# Patient Record
Sex: MALE | Race: WHITE | NOT HISPANIC OR LATINO | URBAN - METROPOLITAN AREA
[De-identification: names, ages, dates, MRNs, and addresses within clinical notes are randomized per-mention and may not be internally consistent; named-entity substitution may affect disease eponyms.]

---

## 2016-08-24 RX ORDER — PEMBROLIZUMAB 25 MG/ML
0 INJECTION, SOLUTION INTRAVENOUS
Qty: 0 | Refills: 0 | COMMUNITY
Start: 2016-08-24 | End: 2017-05-24

## 2017-06-17 RX ORDER — PAZOPANIB HYDROCHLORIDE 200 MG/1
3 TABLET, FILM COATED ORAL
Qty: 0 | Refills: 0 | DISCHARGE
Start: 2017-06-17

## 2017-06-27 ENCOUNTER — INPATIENT (INPATIENT)
Facility: HOSPITAL | Age: 57
LOS: 0 days | Discharge: HOME | End: 2017-06-28
Attending: HOSPITALIST | Admitting: HOSPITALIST
Payer: COMMERCIAL

## 2017-06-27 VITALS
OXYGEN SATURATION: 95 % | DIASTOLIC BLOOD PRESSURE: 86 MMHG | SYSTOLIC BLOOD PRESSURE: 146 MMHG | HEART RATE: 80 BPM | TEMPERATURE: 98 F | RESPIRATION RATE: 24 BRPM

## 2017-06-27 DIAGNOSIS — N19 UNSPECIFIED KIDNEY FAILURE: ICD-10-CM

## 2017-06-27 DIAGNOSIS — I10 ESSENTIAL (PRIMARY) HYPERTENSION: ICD-10-CM

## 2017-06-27 DIAGNOSIS — Z90.5 ACQUIRED ABSENCE OF KIDNEY: Chronic | ICD-10-CM

## 2017-06-27 DIAGNOSIS — R63.8 OTHER SYMPTOMS AND SIGNS CONCERNING FOOD AND FLUID INTAKE: ICD-10-CM

## 2017-06-27 DIAGNOSIS — A41.9 SEPSIS, UNSPECIFIED ORGANISM: ICD-10-CM

## 2017-06-27 DIAGNOSIS — M25.532 PAIN IN LEFT WRIST: ICD-10-CM

## 2017-06-27 DIAGNOSIS — Z90.2 ACQUIRED ABSENCE OF LUNG [PART OF]: Chronic | ICD-10-CM

## 2017-06-27 DIAGNOSIS — C64.9 MALIGNANT NEOPLASM OF UNSPECIFIED KIDNEY, EXCEPT RENAL PELVIS: ICD-10-CM

## 2017-06-27 DIAGNOSIS — Z29.9 ENCOUNTER FOR PROPHYLACTIC MEASURES, UNSPECIFIED: ICD-10-CM

## 2017-06-27 DIAGNOSIS — G47.33 OBSTRUCTIVE SLEEP APNEA (ADULT) (PEDIATRIC): ICD-10-CM

## 2017-06-27 LAB
ALBUMIN SERPL ELPH-MCNC: 3.9 G/DL — SIGNIFICANT CHANGE UP (ref 3.3–5)
ALP SERPL-CCNC: 72 U/L — SIGNIFICANT CHANGE UP (ref 40–120)
ALT FLD-CCNC: 21 U/L — SIGNIFICANT CHANGE UP (ref 4–41)
APPEARANCE UR: CLEAR — SIGNIFICANT CHANGE UP
AST SERPL-CCNC: 30 U/L — SIGNIFICANT CHANGE UP (ref 4–40)
BASE EXCESS BLDV CALC-SCNC: 2.1 MMOL/L — SIGNIFICANT CHANGE UP
BASOPHILS # BLD AUTO: 0.01 K/UL — SIGNIFICANT CHANGE UP (ref 0–0.2)
BASOPHILS NFR BLD AUTO: 0.1 % — SIGNIFICANT CHANGE UP (ref 0–2)
BILIRUB SERPL-MCNC: 0.8 MG/DL — SIGNIFICANT CHANGE UP (ref 0.2–1.2)
BILIRUB UR-MCNC: NEGATIVE — SIGNIFICANT CHANGE UP
BLOOD GAS VENOUS - CREATININE: 1.48 MG/DL — HIGH (ref 0.5–1.3)
BLOOD UR QL VISUAL: HIGH
BUN SERPL-MCNC: 23 MG/DL — SIGNIFICANT CHANGE UP (ref 7–23)
CALCIUM SERPL-MCNC: 9.4 MG/DL — SIGNIFICANT CHANGE UP (ref 8.4–10.5)
CHLORIDE BLDV-SCNC: 103 MMOL/L — SIGNIFICANT CHANGE UP (ref 96–108)
CHLORIDE SERPL-SCNC: 99 MMOL/L — SIGNIFICANT CHANGE UP (ref 98–107)
CO2 SERPL-SCNC: 25 MMOL/L — SIGNIFICANT CHANGE UP (ref 22–31)
COLOR SPEC: YELLOW — SIGNIFICANT CHANGE UP
CREAT SERPL-MCNC: 1.61 MG/DL — HIGH (ref 0.5–1.3)
EOSINOPHIL # BLD AUTO: 0.1 K/UL — SIGNIFICANT CHANGE UP (ref 0–0.5)
EOSINOPHIL NFR BLD AUTO: 1.3 % — SIGNIFICANT CHANGE UP (ref 0–6)
GAS PNL BLDV: 137 MMOL/L — SIGNIFICANT CHANGE UP (ref 136–146)
GLUCOSE BLDV-MCNC: 126 — HIGH (ref 70–99)
GLUCOSE SERPL-MCNC: 123 MG/DL — HIGH (ref 70–99)
GLUCOSE UR-MCNC: NEGATIVE — SIGNIFICANT CHANGE UP
HCO3 BLDV-SCNC: 25 MMOL/L — SIGNIFICANT CHANGE UP (ref 20–27)
HCT VFR BLD CALC: 44.2 % — SIGNIFICANT CHANGE UP (ref 39–50)
HCT VFR BLDV CALC: 51.6 % — HIGH (ref 39–51)
HGB BLD-MCNC: 14.9 G/DL — SIGNIFICANT CHANGE UP (ref 13–17)
HGB BLDV-MCNC: 16.9 G/DL — SIGNIFICANT CHANGE UP (ref 13–17)
IMM GRANULOCYTES NFR BLD AUTO: 0.6 % — SIGNIFICANT CHANGE UP (ref 0–1.5)
KETONES UR-MCNC: NEGATIVE — SIGNIFICANT CHANGE UP
LACTATE BLDV-MCNC: 1.9 MMOL/L — SIGNIFICANT CHANGE UP (ref 0.5–2)
LEUKOCYTE ESTERASE UR-ACNC: NEGATIVE — SIGNIFICANT CHANGE UP
LYMPHOCYTES # BLD AUTO: 0.82 K/UL — LOW (ref 1–3.3)
LYMPHOCYTES # BLD AUTO: 10.4 % — LOW (ref 13–44)
MCHC RBC-ENTMCNC: 28.4 PG — SIGNIFICANT CHANGE UP (ref 27–34)
MCHC RBC-ENTMCNC: 33.7 % — SIGNIFICANT CHANGE UP (ref 32–36)
MCV RBC AUTO: 84.4 FL — SIGNIFICANT CHANGE UP (ref 80–100)
MONOCYTES # BLD AUTO: 0.35 K/UL — SIGNIFICANT CHANGE UP (ref 0–0.9)
MONOCYTES NFR BLD AUTO: 4.5 % — SIGNIFICANT CHANGE UP (ref 2–14)
NEUTROPHILS # BLD AUTO: 6.52 K/UL — SIGNIFICANT CHANGE UP (ref 1.8–7.4)
NEUTROPHILS NFR BLD AUTO: 83.1 % — HIGH (ref 43–77)
NITRITE UR-MCNC: NEGATIVE — SIGNIFICANT CHANGE UP
PCO2 BLDV: 46 MMHG — SIGNIFICANT CHANGE UP (ref 41–51)
PH BLDV: 7.38 PH — SIGNIFICANT CHANGE UP (ref 7.32–7.43)
PH UR: 6.5 — SIGNIFICANT CHANGE UP (ref 5–8)
PLATELET # BLD AUTO: 147 K/UL — LOW (ref 150–400)
PMV BLD: 9.2 FL — SIGNIFICANT CHANGE UP (ref 7–13)
PO2 BLDV: 39 MMHG — SIGNIFICANT CHANGE UP (ref 35–40)
POTASSIUM BLDV-SCNC: 4.1 MMOL/L — SIGNIFICANT CHANGE UP (ref 3.4–4.5)
POTASSIUM SERPL-MCNC: 4.3 MMOL/L — SIGNIFICANT CHANGE UP (ref 3.5–5.3)
POTASSIUM SERPL-SCNC: 4.3 MMOL/L — SIGNIFICANT CHANGE UP (ref 3.5–5.3)
PROT SERPL-MCNC: 7.9 G/DL — SIGNIFICANT CHANGE UP (ref 6–8.3)
PROT UR-MCNC: >600 — SIGNIFICANT CHANGE UP
RBC # BLD: 5.24 M/UL — SIGNIFICANT CHANGE UP (ref 4.2–5.8)
RBC # FLD: 13.4 % — SIGNIFICANT CHANGE UP (ref 10.3–14.5)
RBC CASTS # UR COMP ASSIST: SIGNIFICANT CHANGE UP (ref 0–?)
SAO2 % BLDV: 67.3 % — SIGNIFICANT CHANGE UP (ref 60–85)
SODIUM SERPL-SCNC: 138 MMOL/L — SIGNIFICANT CHANGE UP (ref 135–145)
SP GR SPEC: 1.02 — SIGNIFICANT CHANGE UP (ref 1–1.03)
SQUAMOUS # UR AUTO: SIGNIFICANT CHANGE UP
UROBILINOGEN FLD QL: 1 E.U. — SIGNIFICANT CHANGE UP (ref 0.2–1)
WBC # BLD: 7.85 K/UL — SIGNIFICANT CHANGE UP (ref 3.8–10.5)
WBC # FLD AUTO: 7.85 K/UL — SIGNIFICANT CHANGE UP (ref 3.8–10.5)
WBC UR QL: SIGNIFICANT CHANGE UP (ref 0–?)

## 2017-06-27 PROCEDURE — 71020: CPT | Mod: 26

## 2017-06-27 PROCEDURE — 73110 X-RAY EXAM OF WRIST: CPT | Mod: 26,LT

## 2017-06-27 PROCEDURE — 73130 X-RAY EXAM OF HAND: CPT | Mod: 26,LT

## 2017-06-27 PROCEDURE — 99223 1ST HOSP IP/OBS HIGH 75: CPT

## 2017-06-27 RX ORDER — LOSARTAN POTASSIUM 100 MG/1
50 TABLET, FILM COATED ORAL
Qty: 0 | Refills: 0 | Status: DISCONTINUED | OUTPATIENT
Start: 2017-06-27 | End: 2017-06-28

## 2017-06-27 RX ORDER — SODIUM CHLORIDE 9 MG/ML
1000 INJECTION, SOLUTION INTRAVENOUS
Qty: 0 | Refills: 0 | Status: DISCONTINUED | OUTPATIENT
Start: 2017-06-27 | End: 2017-06-27

## 2017-06-27 RX ORDER — VANCOMYCIN HCL 1 G
1000 VIAL (EA) INTRAVENOUS ONCE
Qty: 0 | Refills: 0 | Status: COMPLETED | OUTPATIENT
Start: 2017-06-27 | End: 2017-06-27

## 2017-06-27 RX ORDER — LORATADINE 10 MG/1
10 TABLET ORAL DAILY
Qty: 0 | Refills: 0 | Status: DISCONTINUED | OUTPATIENT
Start: 2017-06-27 | End: 2017-06-28

## 2017-06-27 RX ORDER — HEPARIN SODIUM 5000 [USP'U]/ML
5000 INJECTION INTRAVENOUS; SUBCUTANEOUS EVERY 12 HOURS
Qty: 0 | Refills: 0 | Status: DISCONTINUED | OUTPATIENT
Start: 2017-06-27 | End: 2017-06-28

## 2017-06-27 RX ORDER — OXYCODONE HYDROCHLORIDE 5 MG/1
10 TABLET ORAL EVERY 4 HOURS
Qty: 0 | Refills: 0 | Status: DISCONTINUED | OUTPATIENT
Start: 2017-06-27 | End: 2017-06-28

## 2017-06-27 RX ORDER — AMLODIPINE BESYLATE 2.5 MG/1
5 TABLET ORAL DAILY
Qty: 0 | Refills: 0 | Status: DISCONTINUED | OUTPATIENT
Start: 2017-06-27 | End: 2017-06-28

## 2017-06-27 RX ORDER — LACTOBACILLUS ACIDOPHILUS 100MM CELL
1 CAPSULE ORAL DAILY
Qty: 0 | Refills: 0 | Status: DISCONTINUED | OUTPATIENT
Start: 2017-06-27 | End: 2017-06-28

## 2017-06-27 RX ORDER — METOPROLOL TARTRATE 50 MG
25 TABLET ORAL EVERY 12 HOURS
Qty: 0 | Refills: 0 | Status: DISCONTINUED | OUTPATIENT
Start: 2017-06-27 | End: 2017-06-28

## 2017-06-27 RX ORDER — CHOLECALCIFEROL (VITAMIN D3) 125 MCG
400 CAPSULE ORAL DAILY
Qty: 0 | Refills: 0 | Status: DISCONTINUED | OUTPATIENT
Start: 2017-06-27 | End: 2017-06-28

## 2017-06-27 RX ORDER — PIPERACILLIN AND TAZOBACTAM 4; .5 G/20ML; G/20ML
3.38 INJECTION, POWDER, LYOPHILIZED, FOR SOLUTION INTRAVENOUS ONCE
Qty: 0 | Refills: 0 | Status: COMPLETED | OUTPATIENT
Start: 2017-06-27 | End: 2017-06-27

## 2017-06-27 RX ORDER — SODIUM CHLORIDE 9 MG/ML
1000 INJECTION, SOLUTION INTRAVENOUS
Qty: 0 | Refills: 0 | Status: DISCONTINUED | OUTPATIENT
Start: 2017-06-27 | End: 2017-06-28

## 2017-06-27 RX ORDER — LIDOCAINE 4 G/100G
1 CREAM TOPICAL DAILY
Qty: 0 | Refills: 0 | Status: DISCONTINUED | OUTPATIENT
Start: 2017-06-27 | End: 2017-06-28

## 2017-06-27 RX ORDER — SODIUM CHLORIDE 9 MG/ML
1000 INJECTION INTRAMUSCULAR; INTRAVENOUS; SUBCUTANEOUS ONCE
Qty: 0 | Refills: 0 | Status: COMPLETED | OUTPATIENT
Start: 2017-06-27 | End: 2017-06-27

## 2017-06-27 RX ORDER — OXYCODONE HYDROCHLORIDE 5 MG/1
5 TABLET ORAL EVERY 4 HOURS
Qty: 0 | Refills: 0 | Status: DISCONTINUED | OUTPATIENT
Start: 2017-06-27 | End: 2017-06-28

## 2017-06-27 RX ADMIN — SODIUM CHLORIDE 100 MILLILITER(S): 9 INJECTION, SOLUTION INTRAVENOUS at 22:24

## 2017-06-27 RX ADMIN — Medication 250 MILLIGRAM(S): at 20:37

## 2017-06-27 RX ADMIN — Medication 25 MILLIGRAM(S): at 22:23

## 2017-06-27 RX ADMIN — PIPERACILLIN AND TAZOBACTAM 200 GRAM(S): 4; .5 INJECTION, POWDER, LYOPHILIZED, FOR SOLUTION INTRAVENOUS at 19:23

## 2017-06-27 RX ADMIN — LOSARTAN POTASSIUM 50 MILLIGRAM(S): 100 TABLET, FILM COATED ORAL at 22:53

## 2017-06-27 RX ADMIN — OXYCODONE HYDROCHLORIDE 5 MILLIGRAM(S): 5 TABLET ORAL at 23:23

## 2017-06-27 RX ADMIN — OXYCODONE HYDROCHLORIDE 5 MILLIGRAM(S): 5 TABLET ORAL at 22:53

## 2017-06-27 RX ADMIN — SODIUM CHLORIDE 1000 MILLILITER(S): 9 INJECTION INTRAMUSCULAR; INTRAVENOUS; SUBCUTANEOUS at 18:33

## 2017-06-27 RX ADMIN — LIDOCAINE 1 PATCH: 4 CREAM TOPICAL at 22:23

## 2017-06-27 NOTE — ED PROVIDER NOTE - PMH
Diverticulitis    HTN (hypertension)    Incisional hernia  July 2013 & 2010  Obesity    LALA (obstructive sleep apnea)    Renal cell carcinoma

## 2017-06-27 NOTE — ED ADULT TRIAGE NOTE - CHIEF COMPLAINT QUOTE
pt w hx of kidney cancer '14.  pt took last dose of chemo (immunotherapy)  in May.  Now receiving oral target therapy.  c/o low grade fever (100.2) w generalized fatigue.  Sent by Dr Stewart for eval. "just as precaution."  well appearing male in nad.

## 2017-06-27 NOTE — H&P ADULT - PROBLEM SELECTOR PLAN 4
- in the setting of Renal cell carcinoma  - creatinine currently 1.61 (1.07 in 2014)  - no issues with micturition  - UA with protein > 600  - IVF hydration

## 2017-06-27 NOTE — ED PROVIDER NOTE - PSH
Diverticulitis  2010 surgery for diverticulitis  Incisional hernia  abdominal surgery approximately 2010 & 2013

## 2017-06-27 NOTE — H&P ADULT - PROBLEM SELECTOR PROBLEM 6
Nutrition, metabolism, and development symptoms Need for prophylactic measure LALA (obstructive sleep apnea)

## 2017-06-27 NOTE — H&P ADULT - PROBLEM SELECTOR PLAN 1
- unclear source at present  - malaise, fatigue, chills x 10 days, since starting chemotherapy med  - fever to 102.2 F (39 C) rectally in the ED, neutrophilia to 83.1 (no leukocytosis)  - may be due to medications, as well as sequela of IL-2 medication and starting of chemotherapy (as patient indicated being told that this may occur)  - urinalysis without any sign of infection  - CXR with some atelectasis of RLL area, ?decreased lung markings in JUAN  - s/p Vancomycin 1 gram IV and Zosyn 3.375 grams in the ED; continued  - IVF hydration (s/p 1 liter IV in the ED; continued on maintenance of 0.45 NS at 100 mL/Hr x 10 hours)  - f/u blood and urine cultures  - ID consult in the AM (please call)

## 2017-06-27 NOTE — ED ADULT NURSE NOTE - OBJECTIVE STATEMENT
c/o fever and feeling cold. slept most of day. sent in by doctor for blood work and eval. iv start 22g right fore arm. left wrist and hand swollen from trauma.

## 2017-06-27 NOTE — H&P ADULT - MUSCULOSKELETAL
details… detailed exam no joint erythema/no joint warmth/no calf tenderness/normal strength/ROM intact

## 2017-06-27 NOTE — H&P ADULT - PROBLEM SELECTOR PLAN 3
- on chemotherapy (Votrient x 10 days thus far)  - completed 9 moths of clinical trial on 05/24/2017 at [facility] (Pembrolizumab + [id number]); see narrative above  - followed at [facility] for chemotherapy  - has elevated blood pressure, but had not taken meds today  - creatinine = 1.61  - oncology consult in the AM (please call)

## 2017-06-27 NOTE — H&P ADULT - PMH
Diverticulitis    HTN (hypertension)    Incisional hernia  July 2013 & 2010  Obesity    LALA (obstructive sleep apnea)    Renal cell carcinoma  on chemotherapy Diverticulitis    HTN (hypertension)    Incisional hernia  July 2013 & 2010  Obesity    LALA (obstructive sleep apnea)    Renal cell carcinoma  (left) on chemotherapy - s/p L-nephrectomy

## 2017-06-27 NOTE — H&P ADULT - HISTORY OF PRESENT ILLNESS
57 year old male, with past history significant for HTN, Diverticulitis, Obesity, LALA, Renal cell carcinoma with metastases (on chemotherapy) and Incisional hernia, presented to the ED, from his PMD's office, secondary to fever.  Seen and evaluated at bedside;      Vital signs upon ED presentation as follows: BP = 146/86, HR = 80, RR = 24, T = 36.9 C (98.5 F), O2 Sat = 95% on RA.  Diagnosed with Sepsis.  Prescribed Vancomycin 1 gram IV, Zosyn 3.375 grams and NaCl 1 liter. 57 year old male, with past history significant for HTN, Diverticulitis, Obesity, LALA, Renal cell carcinoma with metastases (on chemotherapy), Left Nephrectomy, Lobectomy (left upper lung & ?wedges, per patient) and Incisional hernia, presented to the ED, from his PMD's office, secondary to fever.  Seen and evaluated at bedside with wife, Ellie, present; ill appearing, but in NAD.  Patient relates being on Votrient (chemotherapy med) for the past 10 days at [facility].  He had been advised of possibility of elevated blood pressure and fatigue.  Fatigue has been present since starting the chemotherapy medication and today, patient went to work and slept for (six) 6 hours.  He was subsequently sent to see his PMD and was noted to have a fever (100.2F) at the practitioner's office.  Has been having nausea without vomiting, and chills.  Wife noted that he may have had an unmeasured temperature overnight.  No reports of sweating, vomiting, headaches, abdominal pain, back pain, diarrhea or dysuria.  Has had loss of appetite, chiefly due to loss of taste sensation, and recorded 9 lbs weight loss over the past 10 days.    Patient notedly with pain, tenderness, swelling of the anterior left wrist due to overuse of a caulk gun today - per patient.  He was in a confined area today and had to hold the caulking gun in an awkward position - which resulted in the pain.  Has had pain of the wrist at intervals, but never this intensely.  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    Patient has undergone DNA sequencing re the Renal Cell Carcinoma and has not had any findings of genetic abnormality.    Prior to starting Votrient 10 days ago, patient was on a clinical trial with IL-2 for 9 months at [facility] (Dr. Mookie Mai, [phone]).  Study treatment = Group A - Pembrolizumab + [id number]; stared on 09/15/2016 and  discontinued on 05/24/2017, but has been taking the PO medication for an additional week.    Vital signs upon ED presentation as follows: BP = 146/86, HR = 80, RR = 24, T = 36.9 C (98.5 F), O2 Sat = 95% on RA.  Diagnosed with Sepsis.  Prescribed Vancomycin 1 gram IV, Zosyn 3.375 grams and NaCl 1 liter.

## 2017-06-27 NOTE — H&P ADULT - PSH
Diverticulitis  2010 surgery for diverticulitis  Incisional hernia  abdominal surgery approximately 2010 & 2013 Diverticulitis  2010 surgery for diverticulitis  H/O unilateral nephrectomy  left - 11/2014 ([facility])  History of lobectomy of lung  Left upper lobe and ?wedges (per pt) - 03/2016 at [facility]  Incisional hernia  abdominal surgery approximately 2010 & 2013

## 2017-06-27 NOTE — H&P ADULT - ASSESSMENT
57 year old male, with past history significant for HTN, Diverticulitis, Obesity, LALA, Renal cell carcinoma with metastases (on chemotherapy) and Incisional hernia, presented to the ED, from his PMD's office, secondary to fever.  Vital signs upon ED presentation as follows: BP = 146/86, HR = 80, RR = 24, T = 36.9 C (98.5 F), O2 Sat = 95% on RA.  Diagnosed with Sepsis.  Admitted for further management of

## 2017-06-27 NOTE — ED PROVIDER NOTE - OBJECTIVE STATEMENT
57M PMH renal ca with mets on immunotherapy p/w fever at his PMDs office. Pt reports he started feeling weak sicne starting the immunotherapy associated with some nausea which is c/w expected symptoms following chemo 57M PMH renal ca with mets on immunotherapy p/w fever at his PMDs office. Pt reports he started feeling weak sicne starting the immunotherapy associated with some nausea which is c/w expected symptoms following chemo. PMD evaled pt today, noted to have fever of 100.2 orally. Pt denies cough, sob, abd pain, n/v/d. C/o l wrist pain after overuse of it yesterday with caulk gun. 57M PMH renal ca with mets on immunotherapy p/w fever at his PMDs office. Pt reports he started feeling weak sicne starting the immunotherapy associated with some nausea which is c/w expected symptoms following chemo. PMD evaled pt today, noted to have fever of 100.2 orally. Pt denies cough, sob, abd pain, n/v/d. C/o l wrist pain after overuse of it yesterday with caulk gun.    PMD: Dunia

## 2017-06-27 NOTE — H&P ADULT - NSHPLABSRESULTS_GEN_ALL_CORE
14.9   7.85  )-----------( 147      ( 27 Jun 2017 18:00 )             44.2       06-27    138  |  99  |  23  ----------------------------<  123<H>  4.3   |  25  |  1.61<H>    Ca    9.4      27 Jun 2017 18:00    TPro  7.9  /  Alb  3.9  /  TBili  0.8  /  DBili  x   /  AST  30  /  ALT  21  /  AlkPhos  72  06-27      18:00 - VBG - pH: 7.38  | pCO2: 46    | pO2: 39    | Lactate: 1.9

## 2017-06-27 NOTE — ED PROVIDER NOTE - ATTENDING CONTRIBUTION TO CARE
MATTHEW Attending Note - Dr. Maldonado  57M PMH renal ca with mets on immunotherapy p/w fever at his PMDs office.  PE: pt is alert and oriented, perrl, ent normal, membranes are moist, neck supple. no lymphadenopathy or thyroid enlargement, No JVD.  Chest clear to P&A, Heart- reg rhythm without murmur, rubs or gallops, radial pulses equal bilaterally.  Abd is soft, non-tender, Bowel sounds are active. no mass or organomegaly. : No CVA tenderness. Neuro:  Pt alert and oriented x 3. Perrl    Distal neurosensory is intact. Motor function is 5/5 strength bilaterally.  No focal deficits. Extremities:  No edema.  Skin: warm and dry.  Imp Sepsis.  Plan:  IV fluids, antibiotics and admission

## 2017-06-27 NOTE — H&P ADULT - PROBLEM SELECTOR PLAN 2
- BP to 177/106; asymptomatic  - on Avapro 50 mg (continued as Losartan 50 mg), Amlodipine 5 mg and Metoprolol 25 mg; continued  - follow with repeat measurements

## 2017-06-27 NOTE — H&P ADULT - FAMILY HISTORY
No pertinent family history in first degree relatives <<-----Click on this checkbox to enter Family History Family history of emphysema, mother (smoker)     Family history of leukemia, father     Father  Still living? Yes, Estimated age: Age Unknown  Family history of hypertension, Age at diagnosis: Age Unknown     Aunt  Still living? Unknown  Family history of diabetes mellitus, Age at diagnosis: Age Unknown     Uncle  Still living? Unknown  Family history of diabetes mellitus, Age at diagnosis: Age Unknown

## 2017-06-27 NOTE — ED PROVIDER NOTE - MEDICAL DECISION MAKING DETAILS
57M PMH renal ca on immunotherapy p/w fever +rectal temp here. Will admit for sepsis w/u. Check CBC CMP blood cultures, CXR to eval for PNA. Wrist likely 2/2 overuse, not swollen or warm, however will consider septic arthritis. Check XR wrist to eval for fracture and soft tissue swelling

## 2017-06-27 NOTE — H&P ADULT - NSHPSOCIALHISTORY_GEN_ALL_CORE
SOCIAL HISTORY SOCIAL HISTORY:   - Lives with wife  - Works as Chief   - Stopped drinking heavily in 2014  - Never smoked

## 2017-06-27 NOTE — H&P ADULT - PROBLEM SELECTOR PLAN 5
- possible due to OA associated with overuse  - aggravated by awkward usage of caulk gun today (usually experiences pain, but worst today)  - Oxycodone 5 mg Q4H PRN moderate pain and 10 mg Q4H PRN sever pain  - Lidoderm patch to wrist  - f/u official wrist X-rays results  - consider orthopedic consult if no improvement

## 2017-06-28 VITALS — HEART RATE: 71 BPM | OXYGEN SATURATION: 95 %

## 2017-06-28 LAB
BASOPHILS # BLD AUTO: 0.01 K/UL — SIGNIFICANT CHANGE UP (ref 0–0.2)
BASOPHILS NFR BLD AUTO: 0.2 % — SIGNIFICANT CHANGE UP (ref 0–2)
BUN SERPL-MCNC: 18 MG/DL — SIGNIFICANT CHANGE UP (ref 7–23)
CALCIUM SERPL-MCNC: 8.4 MG/DL — SIGNIFICANT CHANGE UP (ref 8.4–10.5)
CHLORIDE SERPL-SCNC: 99 MMOL/L — SIGNIFICANT CHANGE UP (ref 98–107)
CO2 SERPL-SCNC: 23 MMOL/L — SIGNIFICANT CHANGE UP (ref 22–31)
CREAT SERPL-MCNC: 1.49 MG/DL — HIGH (ref 0.5–1.3)
EOSINOPHIL # BLD AUTO: 0.2 K/UL — SIGNIFICANT CHANGE UP (ref 0–0.5)
EOSINOPHIL NFR BLD AUTO: 3.2 % — SIGNIFICANT CHANGE UP (ref 0–6)
GLUCOSE SERPL-MCNC: 96 MG/DL — SIGNIFICANT CHANGE UP (ref 70–99)
HBA1C BLD-MCNC: 6.2 % — HIGH (ref 4–5.6)
HCT VFR BLD CALC: 41.5 % — SIGNIFICANT CHANGE UP (ref 39–50)
HGB BLD-MCNC: 13.8 G/DL — SIGNIFICANT CHANGE UP (ref 13–17)
IMM GRANULOCYTES NFR BLD AUTO: 0.6 % — SIGNIFICANT CHANGE UP (ref 0–1.5)
LYMPHOCYTES # BLD AUTO: 0.81 K/UL — LOW (ref 1–3.3)
LYMPHOCYTES # BLD AUTO: 12.9 % — LOW (ref 13–44)
MAGNESIUM SERPL-MCNC: 1.9 MG/DL — SIGNIFICANT CHANGE UP (ref 1.6–2.6)
MCHC RBC-ENTMCNC: 28.1 PG — SIGNIFICANT CHANGE UP (ref 27–34)
MCHC RBC-ENTMCNC: 33.3 % — SIGNIFICANT CHANGE UP (ref 32–36)
MCV RBC AUTO: 84.5 FL — SIGNIFICANT CHANGE UP (ref 80–100)
MONOCYTES # BLD AUTO: 0.62 K/UL — SIGNIFICANT CHANGE UP (ref 0–0.9)
MONOCYTES NFR BLD AUTO: 9.9 % — SIGNIFICANT CHANGE UP (ref 2–14)
NEUTROPHILS # BLD AUTO: 4.58 K/UL — SIGNIFICANT CHANGE UP (ref 1.8–7.4)
NEUTROPHILS NFR BLD AUTO: 73.2 % — SIGNIFICANT CHANGE UP (ref 43–77)
PHOSPHATE SERPL-MCNC: 2.9 MG/DL — SIGNIFICANT CHANGE UP (ref 2.5–4.5)
PLATELET # BLD AUTO: 124 K/UL — LOW (ref 150–400)
PMV BLD: 8.7 FL — SIGNIFICANT CHANGE UP (ref 7–13)
POTASSIUM SERPL-MCNC: 4.2 MMOL/L — SIGNIFICANT CHANGE UP (ref 3.5–5.3)
POTASSIUM SERPL-SCNC: 4.2 MMOL/L — SIGNIFICANT CHANGE UP (ref 3.5–5.3)
RBC # BLD: 4.91 M/UL — SIGNIFICANT CHANGE UP (ref 4.2–5.8)
RBC # FLD: 13.6 % — SIGNIFICANT CHANGE UP (ref 10.3–14.5)
SODIUM SERPL-SCNC: 138 MMOL/L — SIGNIFICANT CHANGE UP (ref 135–145)
SPECIMEN SOURCE: SIGNIFICANT CHANGE UP
SPECIMEN SOURCE: SIGNIFICANT CHANGE UP
TSH SERPL-MCNC: 2.69 UIU/ML — SIGNIFICANT CHANGE UP (ref 0.27–4.2)
WBC # BLD: 6.26 K/UL — SIGNIFICANT CHANGE UP (ref 3.8–10.5)
WBC # FLD AUTO: 6.26 K/UL — SIGNIFICANT CHANGE UP (ref 3.8–10.5)

## 2017-06-28 PROCEDURE — 99239 HOSP IP/OBS DSCHRG MGMT >30: CPT

## 2017-06-28 RX ORDER — PIPERACILLIN AND TAZOBACTAM 4; .5 G/20ML; G/20ML
3.38 INJECTION, POWDER, LYOPHILIZED, FOR SOLUTION INTRAVENOUS EVERY 12 HOURS
Qty: 0 | Refills: 0 | Status: DISCONTINUED | OUTPATIENT
Start: 2017-06-28 | End: 2017-06-28

## 2017-06-28 RX ORDER — LOSARTAN POTASSIUM 100 MG/1
1 TABLET, FILM COATED ORAL
Qty: 0 | Refills: 0 | COMMUNITY
Start: 2017-06-28

## 2017-06-28 RX ADMIN — PIPERACILLIN AND TAZOBACTAM 25 GRAM(S): 4; .5 INJECTION, POWDER, LYOPHILIZED, FOR SOLUTION INTRAVENOUS at 08:08

## 2017-06-28 RX ADMIN — OXYCODONE HYDROCHLORIDE 10 MILLIGRAM(S): 5 TABLET ORAL at 10:37

## 2017-06-28 RX ADMIN — LIDOCAINE 1 PATCH: 4 CREAM TOPICAL at 10:07

## 2017-06-28 RX ADMIN — OXYCODONE HYDROCHLORIDE 10 MILLIGRAM(S): 5 TABLET ORAL at 10:07

## 2017-06-28 RX ADMIN — AMLODIPINE BESYLATE 5 MILLIGRAM(S): 2.5 TABLET ORAL at 06:53

## 2017-06-28 RX ADMIN — Medication 25 MILLIGRAM(S): at 06:53

## 2017-06-28 RX ADMIN — LIDOCAINE 1 PATCH: 4 CREAM TOPICAL at 12:44

## 2017-06-28 NOTE — DISCHARGE NOTE ADULT - MEDICATION SUMMARY - MEDICATIONS TO TAKE
I will START or STAY ON the medications listed below when I get home from the hospital:    Probiotic capsule  -- 1 cap(s) by mouth once a day  -- Indication: For Nutrition, metabolism, and development symptoms    Avapro 150 mg oral tablet  -- 1 tab(s) by mouth once a day  -- Indication: For Hypertension    Allegra 180 mg oral tablet  -- 1 tab(s) by mouth once a day  -- Indication: For Allergies    metoprolol succinate 25 mg oral tablet, extended release  -- 1 tab(s) by mouth once a day  -- Indication: For Hypertension    amLODIPine 5 mg oral tablet  -- 1 tab(s) by mouth once a day  -- Indication: For Hypertension    Votrient 200 mg oral tablet  -- 3 tab(s) by mouth once a day (at bedtime)  -- Indication: For Renal cell carcinoma    Vitamin D3 400 intl units oral capsule  -- 1 cap(s) by mouth once a day  -- Indication: For Nutrition, metabolism, and development symptoms

## 2017-06-28 NOTE — PROGRESS NOTE ADULT - PROBLEM SELECTOR PLAN 1
Low grade fever of 100.2 at PMD's and fatigue x10 days. Patient denies any other symptoms and vitals since admission are all wnl. Patient does not meet SIRS criteria and no likely source for infection. Pt also very well appearing and clinically asymptomatic at present. Fatigue likely 2/2 start of new chemotherapy drug Votrient.  - d/c Vancomycin and Zosyn  - f/u blood and urine cultures as outpatient Low grade fever of 100.2 at PMD's and fatigue x10 days. Patient denies any other symptoms and vitals since admission are all wnl. Patient does not meet SIRS criteria and no likely source for infection. Pt also very well appearing and clinically asymptomatic at present. Fatigue likely 2/2 start of new chemotherapy drug Votrient.  - d/c Vancomycin and Zosyn  - f/u blood and urine cultures as outpatient, will call patient to return to ER if Bcx positive.

## 2017-06-28 NOTE — DISCHARGE NOTE ADULT - CONDITIONS AT DISCHARGE
Pt remains alert and oriented with stable vital signs, no acute distress noted, will continue to monitor.

## 2017-06-28 NOTE — DISCHARGE NOTE ADULT - PATIENT PORTAL LINK FT
“You can access the Follow[facility] Patient Portal, offered by [facility], by registering with the following website: [email or web address]”

## 2017-06-28 NOTE — DISCHARGE NOTE ADULT - CARE PLAN
Principal Discharge DX:	Fever Principal Discharge DX:	Fever  Goal:	Outpatient follow up  Instructions for follow-up, activity and diet:	You were sent to the hospital for a low grade fever which might be related to a viral illness or your underlying cancer.  At time of discharge, your urine and blood cultures were still pending but there was low suspicion for infection as you had no localizing symptoms and your chest X-ray and urinalysis was negative. Call your doctor or return ot the hospital if you begin to have recurrent fevers (temperature >100.4).  Secondary Diagnosis:	Renal cell carcinoma  Goal:	Outpatient oncology follow up.  Instructions for follow-up, activity and diet:	Follow up with your oncologist, Dr. Amanda Rice within 1-2 weeks of hospital discharge. Principal Discharge DX:	Fever  Goal:	Outpatient follow up  Instructions for follow-up, activity and diet:	You were sent to the hospital for a low grade fever which might be related to a viral illness or your underlying cancer.  At time of discharge, your urine and blood cultures were still pending but there was low suspicion for infection as you had no localizing symptoms and your chest X-ray and urinalysis was negative. Call your doctor or return ot the hospital if you begin to have recurrent fevers (temperature >100.4).  Secondary Diagnosis:	Renal cell carcinoma  Goal:	Outpatient oncology follow up.  Instructions for follow-up, activity and diet:	Follow up with your oncologist, Dr. Amanda Rice within 1-2 weeks of hospital discharge.  Secondary Diagnosis:	HTN (hypertension)  Goal:	Continued medical management.  Instructions for follow-up, activity and diet:	Continue to take your amlodipine and metoprolol as directed.

## 2017-06-28 NOTE — DISCHARGE NOTE ADULT - HOSPITAL COURSE
HPI:  57 year old male, with past history significant for HTN, Diverticulitis, Obesity, LALA, Renal cell carcinoma with metastases (on chemotherapy), Left Nephrectomy, Lobectomy (left upper lung & ?wedges, per patient) and Incisional hernia, presented to the ED, from his PMD's office, secondary to fever.  Seen and evaluated at bedside with wife, Ellie, present; ill appearing, but in NAD.  Patient relates being on Votrient (chemotherapy med) for the past 10 days at [facility].  He had been advised of possibility of elevated blood pressure and fatigue.  Fatigue has been present since starting the chemotherapy medication and today, patient went to work and slept for (six) 6 hours.  He was subsequently sent to see his PMD and was noted to have a fever (100.2F) at the practitioner's office.  Has been having nausea without vomiting, and chills.  Wife noted that he may have had an unmeasured temperature overnight.  No reports of sweating, vomiting, headaches, abdominal pain, back pain, diarrhea or dysuria.  Has had loss of appetite, chiefly due to loss of taste sensation, and recorded 9 lbs weight loss over the past 10 days.    Patient notedly with pain, tenderness, swelling of the anterior left wrist due to overuse of a caulk gun today - per patient.  He was in a confined area today and had to hold the caulking gun in an awkward position - which resulted in the pain.  Has had pain of the wrist at intervals, but never this intensely.  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    Patient has undergone DNA sequencing re the Renal Cell Carcinoma and has not had any findings of genetic abnormality.    Prior to starting Votrient 10 days ago, patient was on a clinical trial with IL-2 for 9 months at [facility] (Dr. Mookie Mai, [phone]).  Study treatment = Group A - Pembrolizumab + [id number]; stared on 09/15/2016 and  discontinued on 05/24/2017, but has been taking the PO medication for an additional week.    Vital signs upon ED presentation as follows: BP = 146/86, HR = 80, RR = 24, T = 36.9 C (98.5 F), O2 Sat = 95% on RA.  Diagnosed with Sepsis.  Prescribed Vancomycin 1 gram IV, Zosyn 3.375 grams and NaCl 1 liter.    HOSPITAL COURSE: Pt admitted for infectious work up & started empirically on Zosyn.  He had no documented fever in the hospital, remained hemodynamically stable, was non-toxic appearing. On labs, he had no leukocytosis, left-shift or bandemia. CXR & UA was negative. Due to low suspicion for active infection, Zosyn was discontinued as the low grade fever could have been in the setting of underlying malignancy Blood & urine cultures were pending at the time of discharge but since the pt had no clinical evidence of infection or localizing symptoms & felt clinically improved, he was discharged to home w/ instruction to follow up w/ his PMD & oncologist.  In terms of his L-wrist pain, X-ray of L-wrist & L-hand did not show any evidence of fracture or dislocation. HPI:  57 year old male, with past history significant for HTN, Diverticulitis, Obesity, LALA, Renal cell carcinoma with metastases (on chemotherapy), Left Nephrectomy, Lobectomy (left upper lung & ?wedges, per patient) and Incisional hernia, presented to the ED, from his PMD's office, secondary to fever.  Seen and evaluated at bedside with wife, Ellie, present; ill appearing, but in NAD.  Patient relates being on Votrient (chemotherapy med) for the past 10 days at [facility].  He had been advised of possibility of elevated blood pressure and fatigue.  Fatigue has been present since starting the chemotherapy medication and today, patient went to work and slept for (six) 6 hours.  He was subsequently sent to see his PMD and was noted to have a fever (100.2F) at the practitioner's office.  Has been having nausea without vomiting, and chills.  Wife noted that he may have had an unmeasured temperature overnight.  No reports of sweating, vomiting, headaches, abdominal pain, back pain, diarrhea or dysuria.  Has had loss of appetite, chiefly due to loss of taste sensation, and recorded 9 lbs weight loss over the past 10 days.    Patient notedly with pain, tenderness, swelling of the anterior left wrist due to overuse of a caulk gun today - per patient.  He was in a confined area today and had to hold the caulking gun in an awkward position - which resulted in the pain.  Has had pain of the wrist at intervals, but never this intensely.  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    Patient has undergone DNA sequencing re the Renal Cell Carcinoma and has not had any findings of genetic abnormality.    Prior to starting Votrient 10 days ago, patient was on a clinical trial with IL-2 for 9 months at [facility] (Dr. Mookie Mai, [phone]).  Study treatment = Group A - Pembrolizumab + [id number]; stared on 09/15/2016 and  discontinued on 05/24/2017, but has been taking the PO medication for an additional week.    Vital signs upon ED presentation as follows: BP = 146/86, HR = 80, RR = 24, T = 36.9 C (98.5 F), O2 Sat = 95% on RA.  Prescribed Vancomycin 1 gram IV, Zosyn 3.375 grams and NaCl 1 liter.    HOSPITAL COURSE: Pt admitted for infectious work up & started empirically on Zosyn.  He had no documented fever in the hospital, remained hemodynamically stable, was non-toxic appearing. On labs, he had no leukocytosis, left-shift or bandemia. CXR & UA was negative. Due to low suspicion for active infection, Zosyn was discontinued as the low grade fever could have been in the setting of underlying malignancy Blood & urine cultures were pending at the time of discharge but since the pt had no clinical evidence of infection or localizing symptoms & felt clinically improved, he was discharged to home w/ instruction to follow up w/ his PMD & oncologist.  In terms of his L-wrist pain, X-ray of L-wrist & L-hand did not show any evidence of fracture or dislocation.

## 2017-06-28 NOTE — DISCHARGE NOTE ADULT - PROVIDER TOKENS
CHRIS:'[id number]' TOKEN:'[id number]',FREE:[LAST:[Rice],FIRST:[Amanda],PHONE:[phone]],FAX:[(   )    -]]

## 2017-06-28 NOTE — PROGRESS NOTE ADULT - PROBLEM SELECTOR PLAN 5
Possible due to OA associated with overuse and aggravated by awkward usage of caulk gun today (usually experiences pain, but worse yesterday). Pain is improved today and xrays found no bony abnormalities.

## 2017-06-28 NOTE — PROGRESS NOTE ADULT - PROBLEM SELECTOR PLAN 4
- in the setting of Renal cell carcinoma  - creatinine currently 1.49, down from 1.61 on admission  - no issues with micturition  - UA with protein > 600

## 2017-06-28 NOTE — PROGRESS NOTE ADULT - ASSESSMENT
57 year old male, with past history significant for HTN, Diverticulitis, Obesity, LALA, Renal cell carcinoma with metastases (on chemotherapy) and Incisional hernia, presented to the ED, from his PMD's office, secondary to fever.  Vital signs upon ED presentation as follows: BP = 146/86, HR = 80, RR = 24, T = 36.9 C (98.5 F), O2 Sat = 95% on RA.  Diagnosed with Sepsis.  Admitted for further management of 57 year old male, with PMH significant for HTN, Diverticulitis, Obesity, LALA, RCC with metastases (on chemotherapy Votrient) and Incisional hernia, presented to the ED, from his PMD's office, secondary to fever of 100.2 and fatigue.  Vital signs upon ED presentation as follows: BP = 146/86, HR = 80, RR = 24, T = 36.9 C (98.5 F), O2 Sat = 95% on RA. Pt afebrile since admission and asymptomatic.

## 2017-06-28 NOTE — DISCHARGE NOTE ADULT - CARE PROVIDER_API CALL
Jozef Duque (MD), Internal Medicine  [facility] [place] [address]  Phone: [phone]  Fax: [phone] Jozef Duque), Internal Medicine  [facility] [place] [address]  Phone: [phone]  Fax: [phone]    Amanda Rice  Phone: [phone]  Fax: (       -

## 2017-06-28 NOTE — PROGRESS NOTE ADULT - SUBJECTIVE AND OBJECTIVE BOX
Patient is a 57y old  Male who presents with a chief complaint of "Fatigue and pain in my wrist and high blood pressure. My doctor sent me because I had a fever" (27 Jun 2017 23:10)     INTERVAL HPI/OVERNIGHT EVENTS:  Patient says he feels much improved, back to his old self, and that his fatigue and chills are gone.    MEDICATIONS  (STANDING):  heparin  Injectable 5000 Unit(s) SubCutaneous every 12 hours  sodium chloride 0.45%. 1000 milliLiter(s) (100 mL/Hr) IV Continuous <Continuous>  losartan 50 milliGRAM(s) Oral <User Schedule>  lidocaine   Patch 1 Patch Transdermal daily  loratadine 10 milliGRAM(s) Oral daily  metoprolol 25 milliGRAM(s) Oral every 12 hours  amLODIPine   Tablet 5 milliGRAM(s) Oral daily  cholecalciferol 400 Unit(s) Oral daily  lactobacillus acidophilus 1 Tablet(s) Oral daily    MEDICATIONS  (PRN):  oxyCODONE IR 5 milliGRAM(s) Oral every 4 hours PRN Moderate Pain (4 - 6)  oxyCODONE IR 10 milliGRAM(s) Oral every 4 hours PRN Severe Pain (7 - 10)      Allergies:  No Known Allergies    Intolerances      REVIEW OF SYSTEMS:  Constitutional: +fever, weight loss, +fatigue  ENMT:  Denies tinnitus, vertigo, throat pain  Resp: Denies SOB, cough, hemoptysis  CV: Denies chest pain, palpitations, dizziness  GI: Denies abdominal pain, N/V/D/C, melena, hematochezia  : Denies dysuria, increased frequency, hematuria  Neuro: Denies HA, numbness  Skin: Denies rashes, lesions  Lymph Nodes: Denies enlarged glands  Endocrine: Denies heat or cold intolerance, polydipsia, polyuria  MSK: Denies joint pain, swelling  Heme/Lymph: Denies easy bruising, bleeding gums  Immun/All: Denies hives, eczema    Vital Signs Last 24 Hrs  T(C): 37 (28 Jun 2017 05:56), Max: 37.6 (27 Jun 2017 22:16)  T(F): 98.6 (28 Jun 2017 05:56), Max: 99.6 (27 Jun 2017 22:16)  HR: 64 (28 Jun 2017 07:02) (60 - 80)  BP: 136/92 (28 Jun 2017 05:56) (136/92 - 177/106)  BP(mean): --  RR: 20 (28 Jun 2017 07:02) (16 - 24)  SpO2: 94% (28 Jun 2017 07:02) (94% - 99%)    PHYSICAL EXAM:  General: NAD, well-groomed, well-developed  Eyes: EOMI, PERRLA, conjunctiva and sclera clear  ENMT: No tonsillar erythema, exudates, or enlargement; moist mucous membranes  Neck: Supple  Nervous System: AAOX3, motor and sensation grossly intact in b/l UE and b/l LE  Psych: Appropriate affect and mood  Chest: Clear to auscultation bilaterally; no rales, rhonchi, or wheezing  Heart: Regular rate and rhythm; no murmurs, rubs, or gallops; no LE edema  Abd: +BS, soft, nontender, nondistended  Ext:  2+ radial and DP pulses, swelling of L wrist, tenderness to palpation over ulnar aspect of L wrist    LABS:                        13.8   6.26  )-----------( 124      ( 28 Jun 2017 06:00 )             41.5     28 Jun 2017 06:00    138    |  99     |  18     ----------------------------<  96     4.2     |  23     |  1.49     Ca    8.4        28 Jun 2017 06:00  Phos  2.9       28 Jun 2017 06:00  Mg     1.9       28 Jun 2017 06:00    TPro  7.9    /  Alb  3.9    /  TBili  0.8    /  DBili  x      /  AST  30     /  ALT  21     /  AlkPhos  72     27 Jun 2017 18:00    < from: Xray Wrist 3 Views, Left (06.27.17 @ 18:24) >  FINDINGS:     There is no evidence of acute fracture or dislocation of the left wrist   or left hand. Thejoint spaces are maintained. The soft tissues are   unremarkable.    IMPRESSION:     No evidence of acute fracture or dislocation of the left wrist or left   hand.     < end of copied text > MEDICINE ACCEPT NOTE  CC: Patient is a 57y old  Male who presents with a chief complaint of "Fatigue and pain in my wrist and high blood pressure. My doctor sent me because I had a fever" (27 Jun 2017 23:10)     HPI: Pt is a 56 yo M with  PMH of stage IV RCC c/b L nephrectomy, lobectomy, HTN, diverticulitis, LALA sent to the ED by his PMD for fatigue and fever. At PMD's, pt's temp was 100.2 and he complained of fatigue x10 days. Of note, pt started new chemotherapy drug Votrient 10 days ago and has been feeling chills, fatigue and increased BP since then. Pt also recorded a 9 lb weight loss over this period. Since admission, patient feels much improved and no longer feels fatigued or chills.    PMHx:  RCC with mets, c/b L nephrectomy, R lobectomy  HTN  LALA  diverticulitis  hernia    HOME MEDS:  Amlodipine  Propranalol    ALLERGIES:  No known allergies    FHx:  Leukemia - father    SH: Works as a  and lives with his wife. Stopped drinking heavily in 2014. Never smoked.    S: Patient says he feels much improved, back to his old self, and that his fatigue and chills are gone.    MEDICATIONS  (STANDING):  heparin  Injectable 5000 Unit(s) SubCutaneous every 12 hours  sodium chloride 0.45%. 1000 milliLiter(s) (100 mL/Hr) IV Continuous <Continuous>  losartan 50 milliGRAM(s) Oral <User Schedule>  lidocaine   Patch 1 Patch Transdermal daily  loratadine 10 milliGRAM(s) Oral daily  metoprolol 25 milliGRAM(s) Oral every 12 hours  amLODIPine   Tablet 5 milliGRAM(s) Oral daily  cholecalciferol 400 Unit(s) Oral daily  lactobacillus acidophilus 1 Tablet(s) Oral daily    MEDICATIONS  (PRN):  oxyCODONE IR 5 milliGRAM(s) Oral every 4 hours PRN Moderate Pain (4 - 6)  oxyCODONE IR 10 milliGRAM(s) Oral every 4 hours PRN Severe Pain (7 - 10)      REVIEW OF SYSTEMS:  Constitutional: +fever, weight loss, +fatigue  ENMT:  Denies tinnitus, vertigo, throat pain  Resp: Denies SOB, cough, hemoptysis  CV: Denies chest pain, palpitations, dizziness  GI: Denies abdominal pain, N/V/D/C, melena, hematochezia  : Denies dysuria, increased frequency, hematuria  Neuro: Denies HA, numbness  Skin: Denies rashes, lesions  Lymph Nodes: Denies enlarged glands  Endocrine: Denies heat or cold intolerance, polydipsia, polyuria  MSK: Denies joint pain, swelling  Heme/Lymph: Denies easy bruising, bleeding gums  Immun/All: Denies hives, eczema    Vital Signs Last 24 Hrs  T(C): 37 (28 Jun 2017 05:56), Max: 37.6 (27 Jun 2017 22:16)  T(F): 98.6 (28 Jun 2017 05:56), Max: 99.6 (27 Jun 2017 22:16)  HR: 64 (28 Jun 2017 07:02) (60 - 80)  BP: 136/92 (28 Jun 2017 05:56) (136/92 - 177/106)  BP(mean): --  RR: 20 (28 Jun 2017 07:02) (16 - 24)  SpO2: 94% (28 Jun 2017 07:02) (94% - 99%)    PHYSICAL EXAM:  General: NAD, well-groomed, well-developed  Eyes: EOMI, PERRLA, conjunctiva and sclera clear  ENMT: No tonsillar erythema, exudates, or enlargement; moist mucous membranes  Neck: Supple  Nervous System: AAOX3, motor and sensation grossly intact in b/l UE and b/l LE  Psych: Appropriate affect and mood  Chest: Clear to auscultation bilaterally; no rales, rhonchi, or wheezing  Heart: Regular rate and rhythm; no murmurs, rubs, or gallops; no LE edema  Abd: +BS, soft, nontender, nondistended  Ext:  2+ radial and DP pulses, swelling of L wrist, tenderness to palpation over ulnar aspect of L wrist    LABS:                        13.8   6.26  )-----------( 124      ( 28 Jun 2017 06:00 )             41.5     28 Jun 2017 06:00    138    |  99     |  18     ----------------------------<  96     4.2     |  23     |  1.49     Ca    8.4        28 Jun 2017 06:00  Phos  2.9       28 Jun 2017 06:00  Mg     1.9       28 Jun 2017 06:00    TPro  7.9    /  Alb  3.9    /  TBili  0.8    /  DBili  x      /  AST  30     /  ALT  21     /  AlkPhos  72     27 Jun 2017 18:00    < from: Xray Wrist 3 Views, Left (06.27.17 @ 18:24) >  FINDINGS:     There is no evidence of acute fracture or dislocation of the left wrist   or left hand. Thejoint spaces are maintained. The soft tissues are   unremarkable.    IMPRESSION:     No evidence of acute fracture or dislocation of the left wrist or left   hand.

## 2017-06-28 NOTE — PROGRESS NOTE ADULT - ATTENDING COMMENTS
Pt seen and examined, chart and labs reviewed.  Agree with resident note as above.  The patient was sent in by PMD for low grade fevers.  Pt today feels significantly better and denies any infectious complaints including cough, URI symptoms, dysuria, N/V/D.  Pt has been afebrile for the past 24 hours.  The patient already has an oncology appt scheduled for this Friday.  The patient currently is non toxic appearing, afebrile without leukocytosis and improving Cr.  Will discharge home today and will closely follow up Ucx and Bcx.  If cultures are positive, will call patient immediately to return to ER.  Pt is in full agreement with the plan.  D/C time 40 minutes.

## 2017-06-28 NOTE — PROGRESS NOTE ADULT - PROBLEM SELECTOR PLAN 3
- on chemotherapy (Votrient x 10 days thus far)  - completed 9 moths of clinical trial on 05/24/2017 at [facility] (Pembrolizumab + [id number])  - followed at [facility] for chemotherapy  - has elevated blood pressure, but had not taken meds today  - creatinine = 1.61  - oncology consult in the AM (please call) - on chemotherapy (Votrient x 10 days thus far)  - completed 9 moths of clinical trial on 05/24/2017 at [facility] (Pembrolizumab + [id number])  - followed at [facility] for chemotherapy  - has elevated blood pressure, but had not taken meds today  - creatinine downtrending  - Pt has outpatient f/u with oncologist this Friday scheduled

## 2017-06-28 NOTE — DISCHARGE NOTE ADULT - MEDICATION SUMMARY - MEDICATIONS TO STOP TAKING
I will STOP taking the medications listed below when I get home from the hospital:    Keflex 500 mg oral capsule  -- 1 cap(s) by mouth every 6 hours for 7 days    Keytruda  --  intravenous

## 2017-06-28 NOTE — DISCHARGE NOTE ADULT - ADDITIONAL INSTRUCTIONS
Follow up with your primary care doctor, Dr. Tolbert within 1 week of hospital discharge.    Follow up with your oncologist, Dr. Rice within 1 week of hospital discharge. Follow up with your primary care doctor, Dr. Tolbert [phone] within 1 week of hospital discharge.    Follow up with your oncologist, Dr. Rice, [phone], within 1 week of hospital discharge.

## 2017-06-28 NOTE — DISCHARGE NOTE ADULT - PLAN OF CARE
Outpatient follow up You were sent to the hospital for a low grade fever which might be related to a viral illness or your underlying cancer.  At time of discharge, your urine and blood cultures were still pending but there was low suspicion for infection as you had no localizing symptoms and your chest X-ray and urinalysis was negative. Call your doctor or return ot the hospital if you begin to have recurrent fevers (temperature >100.4). Outpatient oncology follow up. Follow up with your oncologist, Dr. Amanda Rice within 1-2 weeks of hospital discharge. Continued medical management. Continue to take your amlodipine and metoprolol as directed.

## 2017-06-29 LAB
BACTERIA UR CULT: SIGNIFICANT CHANGE UP
SPECIMEN SOURCE: SIGNIFICANT CHANGE UP

## 2017-07-02 LAB
BACTERIA BLD CULT: SIGNIFICANT CHANGE UP
BACTERIA BLD CULT: SIGNIFICANT CHANGE UP

## 2017-08-23 ENCOUNTER — APPOINTMENT (OUTPATIENT)
Dept: ULTRASOUND IMAGING | Facility: CLINIC | Age: 57
End: 2017-08-23
Payer: COMMERCIAL

## 2017-08-23 ENCOUNTER — OUTPATIENT (OUTPATIENT)
Dept: OUTPATIENT SERVICES | Facility: HOSPITAL | Age: 57
LOS: 1 days | End: 2017-08-23
Payer: COMMERCIAL

## 2017-08-23 DIAGNOSIS — Z90.5 ACQUIRED ABSENCE OF KIDNEY: Chronic | ICD-10-CM

## 2017-08-23 DIAGNOSIS — Z00.8 ENCOUNTER FOR OTHER GENERAL EXAMINATION: ICD-10-CM

## 2017-08-23 DIAGNOSIS — Z90.2 ACQUIRED ABSENCE OF LUNG [PART OF]: Chronic | ICD-10-CM

## 2017-08-23 PROCEDURE — 76536 US EXAM OF HEAD AND NECK: CPT | Mod: 26

## 2017-08-23 PROCEDURE — 76536 US EXAM OF HEAD AND NECK: CPT

## 2017-12-04 NOTE — ED ADULT NURSE NOTE - NS ED PATIENT SAFETY CONCERN
[address] patient assistance at [phone] and placed order for effexor. Order confirmation number [id number]. This will take 7-10 days to arrive at the office. Patient identification number is [id number]. Refaxed letter to [employer] asking them to send sample medication to patient.
No

## 2018-01-02 ENCOUNTER — EMERGENCY (EMERGENCY)
Facility: HOSPITAL | Age: 58
LOS: 1 days | Discharge: HOME | End: 2018-01-02
Attending: EMERGENCY MEDICINE | Admitting: EMERGENCY MEDICINE
Payer: SELF-PAY

## 2018-01-02 VITALS
SYSTOLIC BLOOD PRESSURE: 152 MMHG | TEMPERATURE: 100 F | HEART RATE: 87 BPM | DIASTOLIC BLOOD PRESSURE: 95 MMHG | OXYGEN SATURATION: 98 % | RESPIRATION RATE: 18 BRPM

## 2018-01-02 DIAGNOSIS — Z90.5 ACQUIRED ABSENCE OF KIDNEY: Chronic | ICD-10-CM

## 2018-01-02 DIAGNOSIS — Z90.2 ACQUIRED ABSENCE OF LUNG [PART OF]: Chronic | ICD-10-CM

## 2018-01-02 LAB
ALBUMIN SERPL ELPH-MCNC: 4 G/DL — SIGNIFICANT CHANGE UP (ref 3.3–5)
ALP SERPL-CCNC: 50 U/L — SIGNIFICANT CHANGE UP (ref 40–120)
ALT FLD-CCNC: 22 U/L — SIGNIFICANT CHANGE UP (ref 4–41)
ANISOCYTOSIS BLD QL: SLIGHT — SIGNIFICANT CHANGE UP
APPEARANCE UR: CLEAR — SIGNIFICANT CHANGE UP
AST SERPL-CCNC: 34 U/L — SIGNIFICANT CHANGE UP (ref 4–40)
BASE EXCESS BLDV CALC-SCNC: 3.1 MMOL/L — SIGNIFICANT CHANGE UP
BASOPHILS # BLD AUTO: 0.01 K/UL — SIGNIFICANT CHANGE UP (ref 0–0.2)
BASOPHILS NFR BLD AUTO: 0.2 % — SIGNIFICANT CHANGE UP (ref 0–2)
BASOPHILS NFR SPEC: 0 % — SIGNIFICANT CHANGE UP (ref 0–2)
BILIRUB SERPL-MCNC: 0.7 MG/DL — SIGNIFICANT CHANGE UP (ref 0.2–1.2)
BILIRUB UR-MCNC: NEGATIVE — SIGNIFICANT CHANGE UP
BLOOD GAS VENOUS - CREATININE: 1.69 MG/DL — HIGH (ref 0.5–1.3)
BLOOD UR QL VISUAL: HIGH
BUN SERPL-MCNC: 21 MG/DL — SIGNIFICANT CHANGE UP (ref 7–23)
CALCIUM SERPL-MCNC: 8.9 MG/DL — SIGNIFICANT CHANGE UP (ref 8.4–10.5)
CHLORIDE BLDV-SCNC: 103 MMOL/L — SIGNIFICANT CHANGE UP (ref 96–108)
CHLORIDE SERPL-SCNC: 98 MMOL/L — SIGNIFICANT CHANGE UP (ref 98–107)
CO2 SERPL-SCNC: 24 MMOL/L — SIGNIFICANT CHANGE UP (ref 22–31)
COLOR SPEC: YELLOW — SIGNIFICANT CHANGE UP
CREAT SERPL-MCNC: 1.65 MG/DL — HIGH (ref 0.5–1.3)
EOSINOPHIL # BLD AUTO: 0.04 K/UL — SIGNIFICANT CHANGE UP (ref 0–0.5)
EOSINOPHIL NFR BLD AUTO: 1 % — SIGNIFICANT CHANGE UP (ref 0–6)
EOSINOPHIL NFR FLD: 2 % — SIGNIFICANT CHANGE UP (ref 0–6)
GAS PNL BLDV: 133 MMOL/L — LOW (ref 136–146)
GLUCOSE BLDV-MCNC: 92 — SIGNIFICANT CHANGE UP (ref 70–99)
GLUCOSE SERPL-MCNC: 87 MG/DL — SIGNIFICANT CHANGE UP (ref 70–99)
GLUCOSE UR-MCNC: NEGATIVE — SIGNIFICANT CHANGE UP
HCO3 BLDV-SCNC: 24 MMOL/L — SIGNIFICANT CHANGE UP (ref 20–27)
HCT VFR BLD CALC: 50.3 % — HIGH (ref 39–50)
HCT VFR BLDV CALC: 53.5 % — HIGH (ref 39–51)
HGB BLD-MCNC: 17.1 G/DL — HIGH (ref 13–17)
HGB BLDV-MCNC: 17.5 G/DL — HIGH (ref 13–17)
IMM GRANULOCYTES # BLD AUTO: 0.02 # — SIGNIFICANT CHANGE UP
IMM GRANULOCYTES NFR BLD AUTO: 0.5 % — SIGNIFICANT CHANGE UP (ref 0–1.5)
KETONES UR-MCNC: NEGATIVE — SIGNIFICANT CHANGE UP
LACTATE BLDV-MCNC: 1.8 MMOL/L — SIGNIFICANT CHANGE UP (ref 0.5–2)
LEUKOCYTE ESTERASE UR-ACNC: NEGATIVE — SIGNIFICANT CHANGE UP
LYMPHOCYTES # BLD AUTO: 0.76 K/UL — LOW (ref 1–3.3)
LYMPHOCYTES # BLD AUTO: 18.9 % — SIGNIFICANT CHANGE UP (ref 13–44)
LYMPHOCYTES NFR SPEC AUTO: 16 % — SIGNIFICANT CHANGE UP (ref 13–44)
MANUAL SMEAR VERIFICATION: SIGNIFICANT CHANGE UP
MCHC RBC-ENTMCNC: 32.8 PG — SIGNIFICANT CHANGE UP (ref 27–34)
MCHC RBC-ENTMCNC: 34 % — SIGNIFICANT CHANGE UP (ref 32–36)
MCV RBC AUTO: 96.4 FL — SIGNIFICANT CHANGE UP (ref 80–100)
MONOCYTES # BLD AUTO: 0.77 K/UL — SIGNIFICANT CHANGE UP (ref 0–0.9)
MONOCYTES NFR BLD AUTO: 19.1 % — HIGH (ref 2–14)
MONOCYTES NFR BLD: 15 % — HIGH (ref 2–9)
MUCOUS THREADS # UR AUTO: SIGNIFICANT CHANGE UP
MYELOCYTES NFR BLD: 1 % — HIGH (ref 0–0)
NEUTROPHIL AB SER-ACNC: 55 % — SIGNIFICANT CHANGE UP (ref 43–77)
NEUTROPHILS # BLD AUTO: 2.43 K/UL — SIGNIFICANT CHANGE UP (ref 1.8–7.4)
NEUTROPHILS NFR BLD AUTO: 60.3 % — SIGNIFICANT CHANGE UP (ref 43–77)
NEUTS BAND # BLD: 8 % — HIGH (ref 0–6)
NITRITE UR-MCNC: NEGATIVE — SIGNIFICANT CHANGE UP
NRBC # FLD: 0 — SIGNIFICANT CHANGE UP
PCO2 BLDV: 53 MMHG — HIGH (ref 41–51)
PH BLDV: 7.35 PH — SIGNIFICANT CHANGE UP (ref 7.32–7.43)
PH UR: 6.5 — SIGNIFICANT CHANGE UP (ref 4.6–8)
PLATELET # BLD AUTO: 109 K/UL — LOW (ref 150–400)
PLATELET COUNT - ESTIMATE: SIGNIFICANT CHANGE UP
PMV BLD: 8.8 FL — SIGNIFICANT CHANGE UP (ref 7–13)
PO2 BLDV: 28 MMHG — LOW (ref 35–40)
POTASSIUM BLDV-SCNC: 3.8 MMOL/L — SIGNIFICANT CHANGE UP (ref 3.4–4.5)
POTASSIUM SERPL-MCNC: 4.1 MMOL/L — SIGNIFICANT CHANGE UP (ref 3.5–5.3)
POTASSIUM SERPL-SCNC: 4.1 MMOL/L — SIGNIFICANT CHANGE UP (ref 3.5–5.3)
PROT SERPL-MCNC: 7.6 G/DL — SIGNIFICANT CHANGE UP (ref 6–8.3)
PROT UR-MCNC: 300 MG/DL — SIGNIFICANT CHANGE UP
RBC # BLD: 5.22 M/UL — SIGNIFICANT CHANGE UP (ref 4.2–5.8)
RBC # FLD: 14.8 % — HIGH (ref 10.3–14.5)
RBC CASTS # UR COMP ASSIST: HIGH (ref 0–?)
SAO2 % BLDV: 45.4 % — LOW (ref 60–85)
SODIUM SERPL-SCNC: 137 MMOL/L — SIGNIFICANT CHANGE UP (ref 135–145)
SP GR SPEC: 1.02 — SIGNIFICANT CHANGE UP (ref 1–1.04)
SQUAMOUS # UR AUTO: SIGNIFICANT CHANGE UP
UROBILINOGEN FLD QL: NORMAL MG/DL — SIGNIFICANT CHANGE UP
VARIANT LYMPHS # BLD: 3 % — SIGNIFICANT CHANGE UP
WBC # BLD: 4.03 K/UL — SIGNIFICANT CHANGE UP (ref 3.8–10.5)
WBC # FLD AUTO: 4.03 K/UL — SIGNIFICANT CHANGE UP (ref 3.8–10.5)
WBC UR QL: SIGNIFICANT CHANGE UP (ref 0–?)

## 2018-01-02 PROCEDURE — 99284 EMERGENCY DEPT VISIT MOD MDM: CPT

## 2018-01-02 PROCEDURE — 71046 X-RAY EXAM CHEST 2 VIEWS: CPT | Mod: 26

## 2018-01-02 RX ORDER — ACETAMINOPHEN 500 MG
1000 TABLET ORAL ONCE
Qty: 0 | Refills: 0 | Status: COMPLETED | OUTPATIENT
Start: 2018-01-02 | End: 2018-01-02

## 2018-01-02 RX ORDER — VANCOMYCIN HCL 1 G
1000 VIAL (EA) INTRAVENOUS ONCE
Qty: 0 | Refills: 0 | Status: DISCONTINUED | OUTPATIENT
Start: 2018-01-02 | End: 2018-01-06

## 2018-01-02 RX ORDER — AZITHROMYCIN 500 MG/1
1 TABLET, FILM COATED ORAL
Qty: 5 | Refills: 0 | OUTPATIENT
Start: 2018-01-02 | End: 2018-01-06

## 2018-01-02 RX ORDER — SODIUM CHLORIDE 9 MG/ML
1000 INJECTION INTRAMUSCULAR; INTRAVENOUS; SUBCUTANEOUS ONCE
Qty: 0 | Refills: 0 | Status: COMPLETED | OUTPATIENT
Start: 2018-01-02 | End: 2018-01-02

## 2018-01-02 RX ORDER — AZITHROMYCIN 500 MG/1
1 TABLET, FILM COATED ORAL
Qty: 5 | Refills: 0
Start: 2018-01-02 | End: 2018-01-06

## 2018-01-02 RX ORDER — PIPERACILLIN AND TAZOBACTAM 4; .5 G/20ML; G/20ML
3.38 INJECTION, POWDER, LYOPHILIZED, FOR SOLUTION INTRAVENOUS ONCE
Qty: 0 | Refills: 0 | Status: DISCONTINUED | OUTPATIENT
Start: 2018-01-02 | End: 2018-01-06

## 2018-01-02 RX ADMIN — SODIUM CHLORIDE 1000 MILLILITER(S): 9 INJECTION INTRAMUSCULAR; INTRAVENOUS; SUBCUTANEOUS at 17:52

## 2018-01-02 RX ADMIN — Medication 400 MILLIGRAM(S): at 17:52

## 2018-01-02 NOTE — ED PROVIDER NOTE - PLAN OF CARE
Take antibiotics as prescribed. Follow up with your oncologist within 2 days.  Advance activity as tolerated.  Continue all previously prescribed medications as directed. You can use Tylenol 650 mg every 4 hours for pain/fever. Follow up with your primary care physician in 48-72 hours- bring copies of your results.  Return to the emergency department for chest pain, shortness of breath, dizziness, or worsening, concerning, or persistent symptoms.

## 2018-01-02 NOTE — ED PROVIDER NOTE - OBJECTIVE STATEMENT
56 yo M hx of RCC on immunotherapy with hx of mets to thyroid, lungs here for fever x 2 days. tmax 102.8. Pt reports that over the past two days has had fever, taking tylenol, last dose this morning. Went to PMD and flu neg and told to come to ED for blood work and CXR. Pt reports mild congestion and cough over the past few days as well. Denies chills vomiting diarrhea SOB CP weakness HA dizziness rash. Denies chemo.

## 2018-01-02 NOTE — ED PROVIDER NOTE - PMH
Diverticulitis    HTN (hypertension)    Incisional hernia  July 2013 & 2010  Obesity    LALA (obstructive sleep apnea)    Renal cell carcinoma  (left) on chemotherapy - s/p L-nephrectomy

## 2018-01-02 NOTE — ED PROVIDER NOTE - CHPI ED SYMPTOMS NEG
no tingling/no decreased eating/drinking/no dizziness/no pain/no nausea/no weakness/no numbness/no vomiting/no chills

## 2018-01-02 NOTE — ED PROVIDER NOTE - PSH
Diverticulitis  2010 surgery for diverticulitis  H/O unilateral nephrectomy  left - 11/2014 ([facility])  History of lobectomy of lung  Left upper lobe and ?wedges (per pt) - 03/2016 at [facility]  Incisional hernia  abdominal surgery approximately 2010 & 2013

## 2018-01-02 NOTE — ED PROVIDER NOTE - PROGRESS NOTE DETAILS
ROSHAN Crenshaw: labs reassuring, UA negative, CXR clear for PNA however given symptoms and clinical picture discussed with patient staying in hospital for work up of fever with cultures, pt is adament that he does not want to stay in hospital and wants to go home. Given no neurotpenia or WBC elevation and CXR clear will dc with rusty with close follow up. I called pts onc at [facility] Dr. Rice and left message with answering service however have not received call back from covering physician. Will dc with copies of results and abx.

## 2018-01-02 NOTE — ED ADULT TRIAGE NOTE - CHIEF COMPLAINT QUOTE
Oncologist sent for CXR 2/2 cough and fever. PMH metastatic renal cell carcinoma on PO treatment. Pt not on chemo or radiation. Tested for the flu yesterday and came back negative. Sent to r/o PNA.

## 2018-01-02 NOTE — ED PROVIDER NOTE - MEDICAL DECISION MAKING DETAILS
58 yo M here for fever in setting on RCC with mets. On immunotherapy, localized tx, denies chemo. PLAN: labs, urine, cultures, cxr, abx, antipyretics.

## 2018-01-02 NOTE — ED ADULT NURSE NOTE - OBJECTIVE STATEMENT
Patient received into INT07 AA&Ox3 c/o fever, congestion, and cough x2 days. Patient denies chest pain, N/V, SOB, dyspnea, abdominal pain at this time. 20g PIV in place to left FA, labs drawn, medications/1L NS bolus administered per orders, NAD noted - will continue to monitor.

## 2018-01-03 LAB
SPECIMEN SOURCE: SIGNIFICANT CHANGE UP
SPECIMEN SOURCE: SIGNIFICANT CHANGE UP

## 2018-01-04 LAB
BACTERIA UR CULT: SIGNIFICANT CHANGE UP
SPECIMEN SOURCE: SIGNIFICANT CHANGE UP

## 2018-01-07 LAB
BACTERIA BLD CULT: SIGNIFICANT CHANGE UP
BACTERIA BLD CULT: SIGNIFICANT CHANGE UP

## 2018-03-19 ENCOUNTER — RESULT REVIEW (OUTPATIENT)
Age: 58
End: 2018-03-19

## 2018-04-09 ENCOUNTER — OUTPATIENT (OUTPATIENT)
Dept: OUTPATIENT SERVICES | Facility: HOSPITAL | Age: 58
LOS: 1 days | End: 2018-04-09
Payer: COMMERCIAL

## 2018-04-09 ENCOUNTER — APPOINTMENT (OUTPATIENT)
Dept: ULTRASOUND IMAGING | Facility: IMAGING CENTER | Age: 58
End: 2018-04-09
Payer: COMMERCIAL

## 2018-04-09 DIAGNOSIS — Z90.2 ACQUIRED ABSENCE OF LUNG [PART OF]: Chronic | ICD-10-CM

## 2018-04-09 DIAGNOSIS — Z00.8 ENCOUNTER FOR OTHER GENERAL EXAMINATION: ICD-10-CM

## 2018-04-09 DIAGNOSIS — Z90.5 ACQUIRED ABSENCE OF KIDNEY: Chronic | ICD-10-CM

## 2018-04-09 PROCEDURE — 76536 US EXAM OF HEAD AND NECK: CPT

## 2018-04-09 PROCEDURE — 76536 US EXAM OF HEAD AND NECK: CPT | Mod: 26

## 2018-06-04 ENCOUNTER — APPOINTMENT (OUTPATIENT)
Dept: PHYSICAL MEDICINE AND REHAB | Facility: CLINIC | Age: 58
End: 2018-06-04
Payer: COMMERCIAL

## 2018-06-04 VITALS
TEMPERATURE: 98.1 F | DIASTOLIC BLOOD PRESSURE: 82 MMHG | SYSTOLIC BLOOD PRESSURE: 138 MMHG | HEART RATE: 60 BPM | OXYGEN SATURATION: 97 %

## 2018-06-04 DIAGNOSIS — M46.90 UNSPECIFIED INFLAMMATORY SPONDYLOPATHY, SITE UNSPECIFIED: ICD-10-CM

## 2018-06-04 PROCEDURE — 99203 OFFICE O/P NEW LOW 30 MIN: CPT

## 2018-10-12 ENCOUNTER — APPOINTMENT (OUTPATIENT)
Dept: PHYSICAL MEDICINE AND REHAB | Facility: CLINIC | Age: 58
End: 2018-10-12

## 2020-04-13 NOTE — ED PROVIDER NOTE - CARE PLAN
Essential hypertension Principal Discharge DX:	Fever Principal Discharge DX:	Fever  Instructions for follow-up, activity and diet:	Take antibiotics as prescribed. Follow up with your oncologist within 2 days.  Advance activity as tolerated.  Continue all previously prescribed medications as directed. You can use Tylenol 650 mg every 4 hours for pain/fever. Follow up with your primary care physician in 48-72 hours- bring copies of your results.  Return to the emergency department for chest pain, shortness of breath, dizziness, or worsening, concerning, or persistent symptoms.

## 2020-05-28 ENCOUNTER — TRANSCRIPTION ENCOUNTER (OUTPATIENT)
Age: 60
End: 2020-05-28

## 2020-05-28 ENCOUNTER — INPATIENT (INPATIENT)
Facility: HOSPITAL | Age: 60
LOS: 1 days | Discharge: HOME | End: 2020-05-30
Attending: INTERNAL MEDICINE | Admitting: INTERNAL MEDICINE
Payer: COMMERCIAL

## 2020-05-28 ENCOUNTER — APPOINTMENT (OUTPATIENT)
Dept: CARDIOLOGY | Facility: HOSPITAL | Age: 60
End: 2020-05-28

## 2020-05-28 VITALS
DIASTOLIC BLOOD PRESSURE: 107 MMHG | RESPIRATION RATE: 16 BRPM | HEART RATE: 74 BPM | TEMPERATURE: 98 F | SYSTOLIC BLOOD PRESSURE: 172 MMHG

## 2020-05-28 DIAGNOSIS — Z90.5 ACQUIRED ABSENCE OF KIDNEY: Chronic | ICD-10-CM

## 2020-05-28 DIAGNOSIS — Z90.2 ACQUIRED ABSENCE OF LUNG [PART OF]: Chronic | ICD-10-CM

## 2020-05-28 DIAGNOSIS — I21.3 ST ELEVATION (STEMI) MYOCARDIAL INFARCTION OF UNSPECIFIED SITE: ICD-10-CM

## 2020-05-28 LAB
ALBUMIN SERPL ELPH-MCNC: 4.5 G/DL — SIGNIFICANT CHANGE UP (ref 3.3–5)
ALP SERPL-CCNC: 53 U/L — SIGNIFICANT CHANGE UP (ref 40–120)
ALT FLD-CCNC: 25 U/L — SIGNIFICANT CHANGE UP (ref 4–41)
ANION GAP SERPL CALC-SCNC: 17 MMO/L — HIGH (ref 7–14)
APTT BLD: 38.2 SEC — HIGH (ref 27.5–36.3)
AST SERPL-CCNC: 40 U/L — SIGNIFICANT CHANGE UP (ref 4–40)
BASOPHILS # BLD AUTO: 0.02 K/UL — SIGNIFICANT CHANGE UP (ref 0–0.2)
BASOPHILS NFR BLD AUTO: 0.3 % — SIGNIFICANT CHANGE UP (ref 0–2)
BILIRUB SERPL-MCNC: 0.6 MG/DL — SIGNIFICANT CHANGE UP (ref 0.2–1.2)
BLD GP AB SCN SERPL QL: NEGATIVE — SIGNIFICANT CHANGE UP
BUN SERPL-MCNC: 32 MG/DL — HIGH (ref 7–23)
CALCIUM SERPL-MCNC: 9.4 MG/DL — SIGNIFICANT CHANGE UP (ref 8.4–10.5)
CHLORIDE SERPL-SCNC: 101 MMOL/L — SIGNIFICANT CHANGE UP (ref 98–107)
CK MB BLD-MCNC: 3.8 — HIGH (ref 0–2.5)
CK MB BLD-MCNC: 8.94 NG/ML — HIGH (ref 1–6.6)
CK SERPL-CCNC: 237 U/L — HIGH (ref 30–200)
CO2 SERPL-SCNC: 21 MMOL/L — LOW (ref 22–31)
CREAT SERPL-MCNC: 1.59 MG/DL — HIGH (ref 0.5–1.3)
EOSINOPHIL # BLD AUTO: 0.12 K/UL — SIGNIFICANT CHANGE UP (ref 0–0.5)
EOSINOPHIL NFR BLD AUTO: 2 % — SIGNIFICANT CHANGE UP (ref 0–6)
GLUCOSE SERPL-MCNC: 121 MG/DL — HIGH (ref 70–99)
HCT VFR BLD CALC: 56.1 % — HIGH (ref 39–50)
HGB BLD-MCNC: 18.2 G/DL — HIGH (ref 13–17)
IMM GRANULOCYTES NFR BLD AUTO: 1.2 % — SIGNIFICANT CHANGE UP (ref 0–1.5)
INR BLD: 0.9 — SIGNIFICANT CHANGE UP (ref 0.88–1.17)
LYMPHOCYTES # BLD AUTO: 0.83 K/UL — LOW (ref 1–3.3)
LYMPHOCYTES # BLD AUTO: 13.7 % — SIGNIFICANT CHANGE UP (ref 13–44)
MCHC RBC-ENTMCNC: 31.5 PG — SIGNIFICANT CHANGE UP (ref 27–34)
MCHC RBC-ENTMCNC: 32.4 % — SIGNIFICANT CHANGE UP (ref 32–36)
MCV RBC AUTO: 97.1 FL — SIGNIFICANT CHANGE UP (ref 80–100)
MONOCYTES # BLD AUTO: 0.34 K/UL — SIGNIFICANT CHANGE UP (ref 0–0.9)
MONOCYTES NFR BLD AUTO: 5.6 % — SIGNIFICANT CHANGE UP (ref 2–14)
NEUTROPHILS # BLD AUTO: 4.68 K/UL — SIGNIFICANT CHANGE UP (ref 1.8–7.4)
NEUTROPHILS NFR BLD AUTO: 77.2 % — HIGH (ref 43–77)
NRBC # FLD: 0 K/UL — SIGNIFICANT CHANGE UP (ref 0–0)
PLATELET # BLD AUTO: 166 K/UL — SIGNIFICANT CHANGE UP (ref 150–400)
PMV BLD: 9.3 FL — SIGNIFICANT CHANGE UP (ref 7–13)
POTASSIUM SERPL-MCNC: 4.9 MMOL/L — SIGNIFICANT CHANGE UP (ref 3.5–5.3)
POTASSIUM SERPL-SCNC: 4.9 MMOL/L — SIGNIFICANT CHANGE UP (ref 3.5–5.3)
PROT SERPL-MCNC: 7.8 G/DL — SIGNIFICANT CHANGE UP (ref 6–8.3)
PROTHROM AB SERPL-ACNC: 10.3 SEC — SIGNIFICANT CHANGE UP (ref 9.8–13.1)
RBC # BLD: 5.78 M/UL — SIGNIFICANT CHANGE UP (ref 4.2–5.8)
RBC # FLD: 15.9 % — HIGH (ref 10.3–14.5)
RH IG SCN BLD-IMP: POSITIVE — SIGNIFICANT CHANGE UP
SARS-COV-2 RNA SPEC QL NAA+PROBE: SIGNIFICANT CHANGE UP
SODIUM SERPL-SCNC: 139 MMOL/L — SIGNIFICANT CHANGE UP (ref 135–145)
WBC # BLD: 6.06 K/UL — SIGNIFICANT CHANGE UP (ref 3.8–10.5)
WBC # FLD AUTO: 6.06 K/UL — SIGNIFICANT CHANGE UP (ref 3.8–10.5)

## 2020-05-28 PROCEDURE — 76937 US GUIDE VASCULAR ACCESS: CPT | Mod: 26

## 2020-05-28 PROCEDURE — 93458 L HRT ARTERY/VENTRICLE ANGIO: CPT | Mod: 26,59

## 2020-05-28 PROCEDURE — 92941 PRQ TRLML REVSC TOT OCCL AMI: CPT | Mod: RC

## 2020-05-28 PROCEDURE — 93306 TTE W/DOPPLER COMPLETE: CPT | Mod: 26

## 2020-05-28 RX ORDER — TICAGRELOR 90 MG/1
180 TABLET ORAL ONCE
Refills: 0 | Status: COMPLETED | OUTPATIENT
Start: 2020-05-28 | End: 2020-05-28

## 2020-05-28 RX ORDER — TICAGRELOR 90 MG/1
90 TABLET ORAL EVERY 12 HOURS
Refills: 0 | Status: DISCONTINUED | OUTPATIENT
Start: 2020-05-28 | End: 2020-05-30

## 2020-05-28 RX ORDER — AMLODIPINE BESYLATE 2.5 MG/1
1 TABLET ORAL
Qty: 0 | Refills: 0 | DISCHARGE

## 2020-05-28 RX ORDER — HEPARIN SODIUM 5000 [USP'U]/ML
INJECTION INTRAVENOUS; SUBCUTANEOUS
Qty: 25000 | Refills: 0 | Status: DISCONTINUED | OUTPATIENT
Start: 2020-05-28 | End: 2020-05-28

## 2020-05-28 RX ORDER — HEPARIN SODIUM 5000 [USP'U]/ML
4000 INJECTION INTRAVENOUS; SUBCUTANEOUS EVERY 6 HOURS
Refills: 0 | Status: DISCONTINUED | OUTPATIENT
Start: 2020-05-28 | End: 2020-05-28

## 2020-05-28 RX ORDER — ATORVASTATIN CALCIUM 80 MG/1
80 TABLET, FILM COATED ORAL AT BEDTIME
Refills: 0 | Status: DISCONTINUED | OUTPATIENT
Start: 2020-05-28 | End: 2020-05-30

## 2020-05-28 RX ORDER — IRBESARTAN 75 MG/1
1 TABLET ORAL
Qty: 0 | Refills: 0 | DISCHARGE

## 2020-05-28 RX ORDER — CHOLECALCIFEROL (VITAMIN D3) 125 MCG
400 CAPSULE ORAL DAILY
Refills: 0 | Status: DISCONTINUED | OUTPATIENT
Start: 2020-05-28 | End: 2020-05-30

## 2020-05-28 RX ORDER — HEPARIN SODIUM 5000 [USP'U]/ML
5000 INJECTION INTRAVENOUS; SUBCUTANEOUS ONCE
Refills: 0 | Status: COMPLETED | OUTPATIENT
Start: 2020-05-28 | End: 2020-05-28

## 2020-05-28 RX ORDER — ASPIRIN/CALCIUM CARB/MAGNESIUM 324 MG
81 TABLET ORAL DAILY
Refills: 0 | Status: DISCONTINUED | OUTPATIENT
Start: 2020-05-28 | End: 2020-05-28

## 2020-05-28 RX ORDER — METOPROLOL TARTRATE 50 MG
1 TABLET ORAL
Qty: 0 | Refills: 0 | DISCHARGE

## 2020-05-28 RX ORDER — ASPIRIN/CALCIUM CARB/MAGNESIUM 324 MG
81 TABLET ORAL DAILY
Refills: 0 | Status: DISCONTINUED | OUTPATIENT
Start: 2020-05-29 | End: 2020-05-30

## 2020-05-28 RX ORDER — LEVOTHYROXINE SODIUM 125 MCG
175 TABLET ORAL DAILY
Refills: 0 | Status: DISCONTINUED | OUTPATIENT
Start: 2020-05-28 | End: 2020-05-30

## 2020-05-28 RX ORDER — CHLORHEXIDINE GLUCONATE 213 G/1000ML
1 SOLUTION TOPICAL
Refills: 0 | Status: DISCONTINUED | OUTPATIENT
Start: 2020-05-28 | End: 2020-05-30

## 2020-05-28 RX ADMIN — TICAGRELOR 180 MILLIGRAM(S): 90 TABLET ORAL at 12:29

## 2020-05-28 RX ADMIN — HEPARIN SODIUM 5000 UNIT(S): 5000 INJECTION INTRAVENOUS; SUBCUTANEOUS at 12:29

## 2020-05-28 RX ADMIN — ATORVASTATIN CALCIUM 80 MILLIGRAM(S): 80 TABLET, FILM COATED ORAL at 21:37

## 2020-05-28 RX ADMIN — TICAGRELOR 90 MILLIGRAM(S): 90 TABLET ORAL at 17:18

## 2020-05-28 NOTE — H&P ADULT - NSICDXPASTSURGICALHX_GEN_ALL_CORE_FT
PAST SURGICAL HISTORY:  Diverticulitis 2010 surgery for diverticulitis    H/O unilateral nephrectomy left - 11/2014 ([facility])    History of lobectomy of lung Left upper lobe and ?wedges (per pt) - 03/2016 at [facility]    Incisional hernia abdominal surgery approximately 2010 & 2013 PAST SURGICAL HISTORY:  Diverticulitis 2010 surgery for diverticulitis    H/O unilateral nephrectomy left - 11/2014 ([facility])    History of lobectomy of lung Left upper lobe and ?wedges (per pt) - 03/2016 at [facility]    Incisional hernia abdominal surgery approximately 2010 & 2013

## 2020-05-28 NOTE — H&P ADULT - HISTORY OF PRESENT ILLNESS
60M with PMH of HTN, LALA, renal cell carcinoma with metastasis to lung s/p left nephrectomy and left upper lung lobectomy /wedge resection (?), presenting with STEMI. Had chest pain at 6am, substernal with associated nausea and diaphoresis, and left arm pain. Went to his doctor and was found to have a STEMI. Given  and sent to ED. In ED, patient additionally loaded with Brilinta and heparin. Patient now s/p cardiac catheterization with stent to RCA and admitted to CCU. 60M with PMH of HTN, LALA, renal cell carcinoma with metastasis to lung s/p left nephrectomy and left upper lung lobectomy /wedge resections x 3, presenting with STEMI. Had chest pain at 6am while driving to work, substernal with associated nausea and diaphoresis, and left arm pain. Went back home, took 2 tylenols but pain did not resolve. Went to his PCP Dr. Villegas who performed an EKG and was found to have a STEMI. Given  and sent to ED. In ED, patient additionally loaded with Brilinta and heparin. Patient now s/p cardiac catheterization and admitted to CCU. Patient's chest pain has resolved, he feels better now. Denies SOB, palpitations. Patient at baseline with dyspnea on exertion due to past lobectomy. 60M with PMH of HTN, LALA, renal cell carcinoma with metastasis to lung s/p left nephrectomy and left upper lung lobectomy /wedge resections x 3, presenting with STEMI. Had chest pain at 6am while driving to work, substernal with associated nausea and diaphoresis, and left arm pain. Went back home, took 2 tylenols but pain did not resolve. Went to his PCP Dr. Villegas who performed an EKG and was found to have a STEMI. Given  and sent to ED. In ED, patient additionally loaded with Brilinta and heparin. Patient now s/p cardiac catheterization with balloon angioplasty and stent to 100% distal RCA. Admitted to CCU. Patient's chest pain has resolved, he feels better now. Denies SOB, palpitations. Patient at baseline with dyspnea on exertion due to past lobectomy.

## 2020-05-28 NOTE — DISCHARGE NOTE PROVIDER - NSDCCPTREATMENT_GEN_ALL_CORE_FT
PRINCIPAL PROCEDURE  Procedure: Left heart cardiac catheterization  Findings and Treatment: PROCEDURE:  --  Sonosite - Diagnostic.  --  Left coronary angiography.  --  Left heart catheterization with ventriculography.  --  Right coronary angiography.  --  Intervention on distal RCA: balloon angioplasty, stent.  VENTRICLES: Analysis of regional contractile function demonstrated moderate diaphragmatic hypokinesis and posterobasal akinesis. Global left ventricular function was moderately depressed. EF estimated was 35 %.  CORONARY VESSELS: The coronary circulation is right dominant.  LM:   --  LM: Normal.  LAD:   --  LAD: Angiography showed minor luminal irregularities with no flow limiting lesions.  --  Mid LAD: There was a discrete 20 % stenosis.  CX:   --  Circumflex: Angiography showed minor luminal irregularities with no flow limiting lesions.  RCA:   --  Mid RCA: Angiography showed minor luminal irregularities with no flow limiting lesions.  --  Distal RCA: There was a 100 % stenosis. There was NEFTALI grade 0 flow through the vessel (no flow).  COMPLICATIONS: There were no complications.  SUMMARY:  1ST LESION INTERVENTIONS: A successful balloon angioplasty with stent was performed on the 100 % lesion in the distal RCA. Following intervention there was a 1 % residual stenosis.  DIAGNOSTIC RECOMMENDATIONS: PCI of distal RCA for treatment of inferior wall STEMI.  INTERVENTIONAL RECOMMENDATIONS: Add aspirin, 81 mg, PO, daily. Add ticagrelor 90 mg twice daily. Patient management should include aggressive medical therapy and close monitoring of BUN and creatinine.      SECONDARY PROCEDURE  Procedure: Transthoracic echocardiogram  Findings and Treatment: PROCEDURE: Transthoracic echocardiogram with 2-D, M-Mode  and complete spectral and color flow Doppler.  INDICATION: Abnormal electrocardiogram (ECG) (EKG)  (R94.31), Atherosclerotic heart disease of native coronary  artery without angina pectoris (I25.10)  DIMENSIONS:  Dimensions:     Normal Values:  LA:     2.9 cm    2.0 - 4.0 cm  Ao:     3.4 cm    2.0 - 3.8 cm  SEPTUM: 0.7 cm    0.6 - 1.2 cm  PWT:    0.8 cm    0.6 - 1.1 cm  LVIDd:  4.9 cm    3.0 - 5.6 cm  LVIDs:  4.1 cm    1.8 - 4.0 cm  Derived Variables:  LVMI: 63 g/m2  RWT: 0.32  Fractional short: 16 %  Ejection Fraction (Teicholtz): 34 %  OBSERVATIONS:  Mitral Valve: Normal mitral valve. Mild mitral regurgitation.  Aortic Root: Normal aortic root.  Aortic Valve: Normal trileaflet aortic valve. Mild aortic regurgitation.  Left Atrium: Normal left atrium.  Left Ventricle: Endocardium not well visualized; grossly moderate to severe segmental left ventricular systolic function. Hypokinesis of the inferior wall, inferolateral wall, and inferoseptum. Normal left ventricular internal dimensions and wall thicknesses.  Right Heart: Normal right atrium. Normal right ventricular size with decreased right ventricular systolic function. Normal tricuspid valve.  Minimal tricuspid regurgitation. Normal pulmonic valve.  Pericardium/Pleura: Normal pericardium with no pericardial  effusion.  CONCLUSIONS:  1. Normal mitral valve. Mild mitral regurgitation.  2. Normal trileaflet aortic valve. Mild aortic regurgitation.  3. Normal left ventricular internal dimensions and wall thicknesses.  4. Endocardium not well visualized; grossly moderate to severe segmental left ventricular systolic function. Hypokinesis of the inferior wall, inferolateral wall, and inferoseptum.  5. Normal right ventricular size with decreased right  ventricular systolic function.

## 2020-05-28 NOTE — H&P ADULT - ASSESSMENT
60M with PMH of HTN, LALA, renal cell carcinoma with metastasis to lung s/p left nephrectomy and left upper lung lobectomy /wedge resection (?), presenting with STEMI. S/p cardiac cath on 5/28 with stent to RCA.      NEURO:  -No active issues    RESP:  #LALA  #RCC with mets to lung s/p lobectomy/wedge resection (?)  -Comfortable on room air, saturating well  -Not on CPAP at home  -Continue to monitor    CV:  #STEMI  -Patient presenting with chest pain. EKG with ***.  -Loaded with ASA/Brilinta, heparin  -s/p LHC with stent to RCA  -Continue with ASA/Brilinta  -Start statin  -trend cardiac enzymes  -check TTE    GI:  -Diet: DASH    RENAL/:  #Renal cell carcinoma s/p left nephrectomy, on chemotherapy (Votrient)  -serum Cr 1.65 in 1/2018, no recent baseline  -serum Cr on admission 1.59, continue to monitor  -Escalante placed    ENDO:  -HbA1C    ID:  -COVID negative x 1    HEME:  -H/H elevated 18.2/56.1 on admission. H/H 17.1/50.3 in 1/2018, no recent baseline. Consider polycythemia (h/o LALA) vs hemoconcentration  -Continue to monitor 60M with PMH of HTN, LALA, renal cell carcinoma with metastasis to lung s/p left nephrectomy and left upper lung lobectomy /wedge resection (?), presenting with STEMI. S/p cardiac cath on 5/28 with HERNAN x 2 to mid RCA and distal RCA.      NEURO:  -No active issues    RESP:  #LALA  -Previously used CPAP at home. Has been off CPAP recently after weight loss, improvement in breathing.    #RCC with mets to lung s/p lobectomy/wedge resections x 3  -Comfortable on room air, saturating well  -Continue to monitor    CV:  #STEMI  -Patient presenting with chest pain. EKG with ST elevations in leads II, III, aVF  -Loaded with ASA/Brilinta, heparin  -s/p LHC with  2 HERNAN to mRCA and dRCA.  -Continue with ASA/Brilinta  -Start Atorvastatin 80mg daily  -hold home beta-blocker for HR in 50s, reintroduce as tolerated.  -trend cardiac enzymes  -check TTE    #HTN  -home meds: Norvasc 10mg, Metoprolol 25mg BID  -Start Norvasc at lower dose 5mg, hold metoprolol for now    GI:  -Diet: DASH    RENAL/:  #Renal cell carcinoma s/p left nephrectomy  -on chemotherapy (Votrient 600mg daily) -- hold Votrient  -serum Cr 1.65 in 1/2018, no recent baseline  -serum Cr on admission 1.59, continue to monitor  -Follows-up with Dr. Amanda Rice ([facility]) for oncology    ENDO:  -Check HbA1C    #Hypothyroidism  -continue with home Levothyroxine 175mcg daily    ID:  -COVID negative x 1    HEME:  -H/H elevated 18.2/56.1 on admission. H/H 17.1/50.3 in 1/2018, no recent baseline. H/o LALA.  -Continue to monitor 60M with PMH of HTN, LALA, renal cell carcinoma with metastasis to lung s/p left nephrectomy and left upper lung lobectomy /wedge resection (?), presenting with STEMI. S/p cardiac cath on 5/28 with balloon angioplasty and stent to 100% distal RCA    NEURO:  -No active issues    RESP:  #LALA  -Previously used CPAP at home. Has been off CPAP recently after weight loss, improvement in breathing.    #RCC with mets to lung s/p lobectomy/wedge resections x 3  -Comfortable on room air, saturating well  -Continue to monitor    CV:  #STEMI  -Patient presenting with chest pain. EKG with ST elevations in leads II, III, aVF  -Loaded with ASA/Brilinta, heparin  -s/p LHC with balloon angioplasty and stent to 100% distal RCA  -Continue with ASA/Brilinta  -Start Atorvastatin 80mg daily  -hold home beta-blocker for HR in 50s, reintroduce as tolerated.  -trend cardiac enzymes  -check TTE    #HTN  -home meds: Norvasc 10mg, Metoprolol 25mg BID  -Start Norvasc at lower dose 5mg, hold metoprolol for now    GI:  -Diet: DASH    RENAL/:  #Renal cell carcinoma s/p left nephrectomy  -on chemotherapy (Votrient 600mg daily) -- hold Votrient  -serum Cr 1.65 in 1/2018, no recent baseline  -serum Cr on admission 1.59, continue to monitor  -Follows-up with Dr. Amanda Rice ([facility]) for oncology    ENDO:  -Check HbA1C    #Hypothyroidism  -continue with home Levothyroxine 175mcg daily    ID:  -COVID negative x 1    HEME:  -H/H elevated 18.2/56.1 on admission. H/H 17.1/50.3 in 1/2018, no recent baseline. H/o LALA.  -Continue to monitor 60M with PMH of HTN, LALA, renal cell carcinoma with metastasis to lung s/p left nephrectomy and left upper lung lobectomy /wedge resection (?), presenting with STEMI. S/p cardiac cath on 5/28 with balloon angioplasty and stent to 100% distal RCA    NEURO:  -No active issues    RESP:  #LALA  -Previously used CPAP at home. Has been off CPAP recently after weight loss, improvement in breathing.    #RCC with mets to lung s/p lobectomy/wedge resections x 3  -Comfortable on room air, saturating well  -Continue to monitor    CV:  #STEMI  -Patient presenting with chest pain. EKG with ST elevations in leads II, III, aVF  -Loaded with ASA/Brilinta, heparin  -s/p LHC with balloon angioplasty and stent to 100% distal RCA  -Continue with ASA/Brilinta  -Start Atorvastatin 80mg daily  -hold home beta-blocker for HR in 50s, reintroduce as tolerated.  -If BP elevated overnight, will start ARB  -trend cardiac enzymes  -check TTE    #HTN  -home meds: Norvasc 10mg, Metoprolol 25mg BID  -hold home meds, if BP elevated overnight, will start ARB    GI:  -Diet: DASH    RENAL/:  #Renal cell carcinoma s/p left nephrectomy  -on chemotherapy (Votrient 600mg daily) -- hold Votrient  -serum Cr 1.65 in 1/2018, no recent baseline  -serum Cr on admission 1.59, continue to monitor  -Follows-up with Dr. Amanda Rice ([facility]) for oncology    ENDO:  -Check HbA1C    #Hypothyroidism  -continue with home Levothyroxine 175mcg daily    ID:  -COVID negative x 1    HEME:  -H/H elevated 18.2/56.1 on admission. H/H 17.1/50.3 in 1/2018, no recent baseline. H/o LALA.  -Continue to monitor

## 2020-05-28 NOTE — CONSULT NOTE ADULT - SUBJECTIVE AND OBJECTIVE BOX
CARDIOLOGY CONSULT NOTE - DR. COATES    HPI:  Patient is a 60 year old man with history of HTN, LALA, renal cell carcinoma with metastasis to lung s/p left nephrectomy and left upper lung lobectomy/wedge resection (?), admitted with acute onset chest pain starting this AM. ECG at outside cardio office with inferior ROCIO. Taken emergently to cath lab after brilinta load, hepatin.  Cath with subtotal distal rca occlusion, moderate lv dysfxn, s/p 2 HERNAN to mid/distal RCA. tolerated procedure well, rocio resolved, chest pain resolved      PAST MEDICAL & SURGICAL HISTORY:  HTN (hypertension)  LALA (obstructive sleep apnea)  Obesity  Diverticulitis  Incisional hernia: July 2013 &amp; 2010  H/O unilateral nephrectomy: left - 11/2014 ([facility])  History of lobectomy of lung: Left upper lobe and ?wedges (per pt) - 03/2016 at [facility]  Incisional hernia: abdominal surgery approximately 2010 &amp; 2013  Diverticulitis: 2010 surgery for diverticulitis        PREVIOUS DIAGNOSTIC TESTING:    [ ] Echocardiogram:  [ ]  Catheterization:  [ ] Stress Test:  	    MEDICATIONS:    Home Medications:  Allegra 180 mg oral tablet: 1 tab(s) orally once a day (27 Jun 2017 22:03)  amLODIPine 5 mg oral tablet: 1 tab(s) orally once a day (27 Jun 2017 22:03)  Avapro 150 mg oral tablet: 1 tab(s) orally once a day (28 Jun 2017 17:07)  metoprolol succinate 25 mg oral tablet, extended release: 1 tab(s) orally once a day (27 Jun 2017 22:03)  Probiotic capsule: 1 cap(s) orally once a day (27 Jun 2017 22:03)  Vitamin D3 400 intl units oral capsule: 1 cap(s) orally once a day (27 Jun 2017 22:03)  Votrient 200 mg oral tablet: 3 tab(s) orally once a day (at bedtime) (27 Jun 2017 21:56)      MEDICATIONS  (STANDING):  chlorhexidine 4% Liquid 1 Application(s) Topical <User Schedule>      FAMILY HISTORY:  Family history of leukemia: father  Family history of emphysema: mother (smoker)  Family history of diabetes mellitus (Aunt, Uncle): maternal aunts and uncles  Family history of hypertension: father      SOCIAL HISTORY:    [ x] Non-smoker  [ ] Smoker  [ ] Alcohol    Allergies    No Known Allergies    Intolerances    	    REVIEW OF SYSTEMS:  CONSTITUTIONAL: No fever, weight loss, or fatigue  EYES: No eye pain, visual disturbances, or discharge  ENMT:  No difficulty hearing, tinnitus, vertigo; No sinus or throat pain  NECK: No pain or stiffness  RESPIRATORY: No cough, wheezing, chills or hemoptysis; No Shortness of Breath  CARDIOVASCULAR: as HPI  GASTROINTESTINAL: No abdominal or epigastric pain. No nausea, vomiting, or hematemesis; No diarrhea or constipation. No melena or hematochezia.  GENITOURINARY: No dysuria, frequency, hematuria, or incontinence  NEUROLOGICAL: No headaches, memory loss, loss of strength, numbness, or tremors  SKIN: No itching, burning, rashes, or lesions   	  [ ] All others negative	  [ ] Unable to obtain    PHYSICAL EXAM:    T(C): 36.8 (05-28-20 @ 14:15), Max: 36.9 (05-28-20 @ 12:21)  HR: 65 (05-28-20 @ 14:45) (58 - 74)  BP: 149/94 (05-28-20 @ 14:45) (148/95 - 172/107)  RR: 17 (05-28-20 @ 14:45) (16 - 17)  SpO2: 100% (05-28-20 @ 14:45) (100% - 100%)  Wt(kg): --  I&O's Summary    Daily Height in cm: 167.64 (28 May 2020 14:15)    Daily     Appearance: Normal	  Psychiatry: A & O x 3, Mood & affect appropriate  HEENT:   Normal oral mucosa, PERRL, EOMI	  Lymphatic: No lymphadenopathy  Cardiovascular: Normal S1 S2,RRR, No JVD, No murmurs  Respiratory: Lungs clear to auscultation	  Gastrointestinal:  Soft, Non-tender, + BS	  Skin: No rashes, No ecchymoses, No cyanosis	  Neurologic: Non-focal  Extremities: Normal range of motion, No clubbing, cyanosis or edema right groin soft, nt, no hematoma  Vascular: Peripheral pulses palpable 2+ bilaterally    TELEMETRY: 	    ECG:  	  RADIOLOGY:  OTHER: 	  	  LABS:	 	    CARDIAC MARKERS:        proBNP:     Lipid Profile:   HgA1c:   TSH:                           18.2   6.06  )-----------( 166      ( 28 May 2020 12:18 )             56.1     05-28    139  |  101  |  32<H>  ----------------------------<  121<H>  4.9   |  21<L>  |  1.59<H>    Ca    9.4      28 May 2020 12:18    TPro  7.8  /  Alb  4.5  /  TBili  0.6  /  DBili  x   /  AST  40  /  ALT  25  /  AlkPhos  53  05-28    PT/INR - ( 28 May 2020 12:18 )   PT: 10.3 SEC;   INR: 0.90          PTT - ( 28 May 2020 12:18 )  PTT:38.2 SEC    Creatinine, Serum: 1.59 mg/dL (05-28-20 @ 12:18)        ASSESSMENT/PLAN:

## 2020-05-28 NOTE — ED PROVIDER NOTE - OBJECTIVE STATEMENT
60y male with PMH of HTN, LALA, renal CA s/p kidney resection presenting with a STEMI. Had chest pain at 6am, substernal with associated nausea and diaphoresis, and left arm pain. Went to his doctor and was found to have a STEMI. Given 324 asa and sent to the ED. States now that his pain has subsided.

## 2020-05-28 NOTE — H&P ADULT - NSICDXFAMILYHX_GEN_ALL_CORE_FT
FAMILY HISTORY:  Family history of emphysema, mother (smoker)  Family history of hypertension, father  Family history of leukemia, father    Aunt  Still living? Unknown  Family history of diabetes mellitus, Age at diagnosis: Age Unknown    Uncle  Still living? Unknown  Family history of diabetes mellitus, Age at diagnosis: Age Unknown

## 2020-05-28 NOTE — DISCHARGE NOTE PROVIDER - PROVIDER TOKENS
PROVIDER:[TOKEN:[[id number]],FOLLOWUP:[1 week],ESTABLISHEDPATIENT:[T]] PROVIDER:[TOKEN:[[id number]],FOLLOWUP:[1 week],ESTABLISHEDPATIENT:[T]]

## 2020-05-28 NOTE — H&P ADULT - NSICDXPASTMEDICALHX_GEN_ALL_CORE_FT
PAST MEDICAL HISTORY:  Diverticulitis     HTN (hypertension)     Incisional hernia July 2013 & 2010    Obesity     LALA (obstructive sleep apnea)

## 2020-05-28 NOTE — ED ADULT NURSE NOTE - OBJECTIVE STATEMENT
Pt presents to ED for STEMI from PCP office. Pt denies CP currently. Pt speaking in complete sentences, skin warm and dry. Pt denies symptoms at this time. Bilateral 18GAC IVs. Pt placed on cardiac monitor and pads. Pt presents to ED for STEMI from PCP office. Pt denies CP currently. Pt speaking in complete sentences, skin warm and dry. Pt denies symptoms at this time. Bilateral 18GAC IVs. Pt placed on cardiac monitor and pads. 325 ASA given with EMS.

## 2020-05-28 NOTE — DISCHARGE NOTE PROVIDER - HOSPITAL COURSE
60M with PMH of HTN, LALA, renal cell carcinoma with metastasis to lung s/p left nephrectomy and left upper lung lobectomy /wedge resections x 3, presenting with STEMI. Had chest pain at 6am while driving to work, substernal with associated nausea and diaphoresis, and left arm pain. Went back home, took 2 tylenols but pain did not resolve. Went to his PCP Dr. Villegas who performed an EKG and was found to have a STEMI. Given  and sent to ED. In ED, patient additionally loaded with Brilinta and heparin. Patient received cardiac catheterization with balloon angioplasty and HERNAN x 2 to 100% distal RCA. Admitted to CCU. 60M with PMH of HTN, LALA, renal cell carcinoma with metastasis to lung s/p left nephrectomy and left upper lung lobectomy /wedge resections x 3, presenting with STEMI. Had chest pain at 6am while driving to work, substernal with associated nausea and diaphoresis, and left arm pain. Went back home, took 2 tylenols but pain did not resolve. Went to his PCP Dr. Villegas who performed an EKG and was found to have a STEMI. Given  and sent to ED. In ED, patient additionally loaded with Brilinta and heparin. Patient received cardiac catheterization with balloon angioplasty and HERNAN x 2 to 100% distal RCA. Admitted to CCU. Patient with few episodes of NSVT, post-cath, resolved. TTE with EF 34%. Patient resumed on his home beta-blocker, started on low-dose ACE, and tolerated. Patient medically stable for discharge.

## 2020-05-28 NOTE — CONSULT NOTE ADULT - ASSESSMENT
60 year old man with history of HTN, LALA, renal cell carcinoma with metastasis to lung s/p left nephrectomy and left upper lung lobectomy/wedge resection (?), admitted acute inferior wall STEMi    1. Inferior Stemi  -s/p HERNAN x 2 to distal RCA  -hd stable, resolved symptoms  -moderate LV dysfxn  -cont ASA, brilinta  -check echo  -eventual bb  -ace/arb on hold with ckd, dye load  -check a1c, flp    2. Ischemic Cardiomyopathy  -stable, no clinical hf, diuretic need  -eventual beta blocker  -check echo     3. CKD  -trend creat    4. Renal Cell CA  -stable, outpt onc f/u  -chemo on hold     dvt ppx    d/w ccu team

## 2020-05-28 NOTE — DISCHARGE NOTE PROVIDER - CARE PROVIDER_API CALL
Chtean Toussaint  CARDIOVASCULAR DISEASE  [address]  [place] [address]  Phone: [phone]  Fax: [phone]  Established Patient  Follow Up Time: 1 week Agustin Jc S  CARDIOVASCULAR DISEASE  [address] [place] [address] [address]  [place] [address]  Phone: [phone]  Fax: [phone]  Established Patient  Follow Up Time: 1 week

## 2020-05-28 NOTE — DISCHARGE NOTE PROVIDER - NSDCMRMEDTOKEN_GEN_ALL_CORE_FT
Allegra 180 mg oral tablet: 1 tab(s) orally once a day  amLODIPine 10 mg oral tablet: 1 tab(s) orally once a day  levothyroxine 175 mcg (0.175 mg) oral tablet: 1 tab(s) orally once a day  metoprolol tartrate 25 mg oral tablet: 1 tab(s) orally 2 times a day  Vitamin D3 400 intl units oral capsule: 1 cap(s) orally once a day  Votrient 200 mg oral tablet: 3 tab(s) orally once a day (at bedtime) Allegra 180 mg oral tablet: 1 tab(s) orally once a day  levothyroxine 175 mcg (0.175 mg) oral tablet: 1 tab(s) orally once a day  metoprolol tartrate 25 mg oral tablet: 1 tab(s) orally 2 times a day  Vitamin D3 400 intl units oral capsule: 1 cap(s) orally once a day Allegra 180 mg oral tablet: 1 tab(s) orally once a day  aspirin 81 mg oral delayed release tablet: 1 tab(s) orally once a day  atorvastatin 80 mg oral tablet: 1 tab(s) orally once a day (at bedtime)  levothyroxine 175 mcg (0.175 mg) oral tablet: 1 tab(s) orally once a day  losartan 25 mg oral tablet: 1 tab(s) orally once a day  metoprolol succinate 25 mg oral tablet, extended release: 1 tab(s) orally once a day   ticagrelor 90 mg oral tablet: 1 tab(s) orally every 12 hours  Vitamin D3 400 intl units oral capsule: 1 cap(s) orally once a day

## 2020-05-28 NOTE — CHART NOTE - NSCHARTNOTEFT_GEN_A_CORE
Patient S/P cardiac cath via right femoral access closed with angioseal. Dressing clean, dry and intact with no bleeding, edema or hematoma noted. Right lower extremity pulses and sensation intact. Will continue to monitor.

## 2020-05-28 NOTE — DISCHARGE NOTE PROVIDER - NSDCCPCAREPLAN_GEN_ALL_CORE_FT
PRINCIPAL DISCHARGE DIAGNOSIS  Diagnosis: STEMI (ST elevation myocardial infarction)  Assessment and Plan of Treatment: You came to the hospital with chest pain. You were found to have a heart attack. This is due to a blockage in your coronary arteries, the blood vessels supplying your heart muscle with blood and oxygen. You received a procedure called a cardiac catheterization (angiogram) and stents were placed to open up the blockage. Upon discharge, please START taking Aspirin 81 mg by mouth daily and Brilinta 90 mg by mouth two times per day. It is very important that you take these medications everyday as prescribed, or else your new stents may close up and cause another heart attack. PRINCIPAL DISCHARGE DIAGNOSIS  Diagnosis: STEMI (ST elevation myocardial infarction)  Assessment and Plan of Treatment: You came to the hospital with chest pain. You were found to have a heart attack. This is due to a blockage in your coronary arteries, the blood vessels supplying your heart muscle with blood and oxygen. You received a procedure called a cardiac catheterization (angiogram) and stents were placed to open up the blockage. Upon discharge, please START taking Aspirin 81 mg by mouth daily and Brilinta 90 mg by mouth two times per day. It is very important that you take these medications everyday as prescribed, or else your new stents may close up and cause another heart attack. Please follow-up with your cardiologist.      SECONDARY DISCHARGE DIAGNOSES  Diagnosis: Acute HFrEF (heart failure with reduced ejection fraction)  Assessment and Plan of Treatment: You had an echocardiogram (ultrasound of your heart) after your heart attack. It showed that your heart's pumping function was reduced. This is likely due to your recent heart attack. Upon discharge, please START taking ***. Please CONTINUE taking Aspirin 81 mg by mouth daily, Brilinta 90 mg by mouth two times per day, and Metoprolol tartrate 25mg by mouth daily. These are important medications to help your weakened heart. Please follow-up with your cardiologist.    Diagnosis: Renal cell carcinoma  Assessment and Plan of Treatment: You have a history of kidney cancer for which you've gotten a kidney resection and are taking chemotherapy. Your kidney function was monitored in the hospital and remained at your baseline. Your chemotherapy was held in the hospital given your acute illness. Please follow-up with your oncologist on when to resume your chemotherapy. PRINCIPAL DISCHARGE DIAGNOSIS  Diagnosis: STEMI (ST elevation myocardial infarction)  Assessment and Plan of Treatment: You came to the hospital with chest pain. You were found to have a heart attack. This is due to a blockage in your coronary arteries, the blood vessels supplying your heart muscle with blood and oxygen. You received a procedure called a cardiac catheterization (angiogram) and stents were placed to open up the blockage. Upon discharge, please START taking Aspirin 81 mg by mouth daily and Brilinta 90 mg by mouth two times per day. It is very important that you take these medications everyday as prescribed, or else your new stents may close up and cause another heart attack. Please follow-up with your cardiologist.      SECONDARY DISCHARGE DIAGNOSES  Diagnosis: Acute HFrEF (heart failure with reduced ejection fraction)  Assessment and Plan of Treatment: You had an echocardiogram (ultrasound of your heart) after your heart attack. It showed that your heart's pumping function was reduced. This is likely due to your recent heart attack. Your medication regimen was adjusted to optimize your acutely weakened heart. Upon discharge, please START taking Losartan 25 mg by mouth daily and Metoprolol SUCCINATE 25mg by mouth daily. Please also START taking Aspirin 81 mg by mouth daily, Brilinta 90 mg by mouth two times per day. Please STOP taking Metoprolol tartrate. Please follow-up with your cardiologist.    Diagnosis: Renal cell carcinoma  Assessment and Plan of Treatment: You have a history of kidney cancer for which you've gotten a kidney resection and are taking chemotherapy. Your kidney function was monitored in the hospital and remained at your baseline. Your chemotherapy was held in the hospital given your acute illness. Please follow-up with your oncologist on when to resume your chemotherapy. PRINCIPAL DISCHARGE DIAGNOSIS  Diagnosis: STEMI (ST elevation myocardial infarction)  Assessment and Plan of Treatment: You came to the hospital with chest pain. You were found to have a heart attack. This is due to a blockage in your coronary arteries, the blood vessels supplying your heart muscle with blood and oxygen. You received a procedure called a cardiac catheterization (angiogram) and stents were placed to open up the blockage. Upon discharge, please START taking Aspirin 81 mg by mouth daily and Brilinta 90 mg by mouth two times per day. It is very important that you take these medications everyday as prescribed, or else your new stents may close up and cause another heart attack. Please follow-up with your cardiologist.      SECONDARY DISCHARGE DIAGNOSES  Diagnosis: Acute HFrEF (heart failure with reduced ejection fraction)  Assessment and Plan of Treatment: You had an echocardiogram (ultrasound of your heart) after your heart attack. It showed that your heart's pumping function was reduced. This is likely due to your recent heart attack. Your medication regimen was adjusted to optimize your acutely weakened heart. Upon discharge, please START taking Losartan 25 mg by mouth daily and Metoprolol SUCCINATE 25mg by mouth daily. Please also START taking Aspirin 81 mg by mouth daily, Brilinta 90 mg by mouth two times per day. Please STOP taking Metoprolol tartrate. Please follow-up with your cardiologist.    Diagnosis: HTN (hypertension)  Assessment and Plan of Treatment: You have a history of high blood pressure for which you take the medications Amlodipine and Metoprolol tartrate. Your regimen was adjusted to better optimize your heart. Upon discharge, please START taking Losartan 25 mg by mouth daily and Metoprolol SUCCINATE 25mg by mouth daily. Please STOP taking Amlodipine and Metoprolol tartrate. Please follow-up with your cardiologist.    Diagnosis: Renal cell carcinoma  Assessment and Plan of Treatment: You have a history of kidney cancer for which you've gotten a kidney resection and are taking chemotherapy. Your kidney function was monitored in the hospital and remained at your baseline. Your chemotherapy was held in the hospital given your acute illness. Please follow-up with your oncologist on when to resume your chemotherapy.

## 2020-05-28 NOTE — ED PROVIDER NOTE - CLINICAL SUMMARY MEDICAL DECISION MAKING FREE TEXT BOX
60y male with a STEMI. GIven asa by ems, will give brilinta and heparin. Call cards for cath lab. admit to ccu

## 2020-05-28 NOTE — H&P ADULT - NSHPSOCIALHISTORY_GEN_ALL_CORE
Patient lives at home with wife and dog. Patient works as .  Remote smoking history (40 years ago).  Denies alcohol use since 2014.  Denies other illicit drugs.

## 2020-05-28 NOTE — ED PROVIDER NOTE - ATTENDING CONTRIBUTION TO CARE
60M p/w sent in by Dr Jc Velez by ambulance for STEMI. Pt reports chest pain since 6AM, rad to neck and L arm, a/w sweating and nausea, no vomiting or passing out.  Never happened before.  USOH prior to event, no fever or cough.  Pt has 1 kidney - s/p nephrectomy due to RCC.  EKG ROCIO II, III, aVF; reciprocal ST dep I, AVL, V2-v3.  D/w Dr Toussaint and cards fellow shortly after arrival - cath lab activated, pt already rec'd ASA by EMS, Rx hep bolus and brillinta; check labs, admit.  VS:  unremarkable    GEN - NAD;  malaise,   A+O x3   HEAD - NC/AT     ENT - PEERL, EOMI, mucous membranes    moist , no discharge      NECK: Neck supple, non-tender without lymphadenopathy, no masses, no JVD  PULM - CTA b/l,  symmetric breath sounds  COR -  normal heart sounds    ABD - , ND, NT, soft,  BACK - no CVA tenderness, nontender spine     EXTREMS - no edema, no deformity, warm and well perfused    SKIN - no rash    or bruising      NEUROLOGIC - alert, face symmetric, speech fluent, sensation nl, motor no focal deficit.

## 2020-05-28 NOTE — ED ADULT NURSE REASSESSMENT NOTE - NS ED NURSE REASSESS COMMENT FT1
Pt transported to cath lab with RN and ED tech. Pt transported to cath lab with RN, ED tech, and fellow.

## 2020-05-28 NOTE — H&P ADULT - NSHPREVIEWOFSYSTEMS_GEN_ALL_CORE
REVIEW OF SYSTEMS:    CONSTITUTIONAL: No weakness, fevers or chills  EYES/ENT: No visual changes;  No vertigo or throat pain   NECK: No pain or stiffness  RESPIRATORY: No cough, wheezing, hemoptysis; No shortness of breath  CARDIOVASCULAR: No chest pain or palpitations  GASTROINTESTINAL: No abdominal or epigastric pain. No nausea, vomiting, or hematemesis; No diarrhea or constipation. No melena or hematochezia.  GENITOURINARY: No dysuria, frequency or hematuria  NEUROLOGICAL: No numbness or weakness  All other review of systems is negative unless indicated above. REVIEW OF SYSTEMS:    CONSTITUTIONAL: No weakness, fevers or chills  EYES/ENT: No visual changes;  No vertigo or throat pain   NECK: No pain or stiffness  RESPIRATORY: No cough, wheezing, hemoptysis; No shortness of breath  CARDIOVASCULAR: +chest pain, now resolved. no palpitations  GASTROINTESTINAL: No abdominal or epigastric pain. No nausea, vomiting, or hematemesis; No diarrhea or constipation. No melena or hematochezia.  GENITOURINARY: No dysuria, frequency or hematuria  NEUROLOGICAL: No numbness or weakness  All other review of systems is negative unless indicated above. REVIEW OF SYSTEMS:    CONSTITUTIONAL: No weakness, fevers or chills  EYES/ENT: No visual changes;  No vertigo or throat pain   NECK: No pain or stiffness  RESPIRATORY: No cough, wheezing, hemoptysis; No shortness of breath  CARDIOVASCULAR: +chest pain, now resolved. no palpitations  GASTROINTESTINAL: No abdominal or epigastric pain. No nausea, vomiting, or hematemesis; No constipation. No melena or hematochezia. Chronic diarrhea 2/2 chemotherapy.  GENITOURINARY: No dysuria, frequency or hematuria  NEUROLOGICAL: No numbness or weakness  All other review of systems is negative unless indicated above.

## 2020-05-29 LAB
ANION GAP SERPL CALC-SCNC: 12 MMO/L — SIGNIFICANT CHANGE UP (ref 7–14)
BUN SERPL-MCNC: 30 MG/DL — HIGH (ref 7–23)
CALCIUM SERPL-MCNC: 8.7 MG/DL — SIGNIFICANT CHANGE UP (ref 8.4–10.5)
CHLORIDE SERPL-SCNC: 101 MMOL/L — SIGNIFICANT CHANGE UP (ref 98–107)
CK MB BLD-MCNC: 149.8 NG/ML — HIGH (ref 1–6.6)
CK MB BLD-MCNC: 6.5 — HIGH (ref 0–2.5)
CK MB BLD-MCNC: 72.22 NG/ML — HIGH (ref 1–6.6)
CK MB BLD-MCNC: 8.9 — HIGH (ref 0–2.5)
CK SERPL-CCNC: 1113 U/L — HIGH (ref 30–200)
CK SERPL-CCNC: 1681 U/L — HIGH (ref 30–200)
CO2 SERPL-SCNC: 19 MMOL/L — LOW (ref 22–31)
CREAT SERPL-MCNC: 1.42 MG/DL — HIGH (ref 0.5–1.3)
GLUCOSE SERPL-MCNC: 115 MG/DL — HIGH (ref 70–99)
HBA1C BLD-MCNC: 6 % — HIGH (ref 4–5.6)
HCT VFR BLD CALC: 52.6 % — HIGH (ref 39–50)
HCV AB S/CO SERPL IA: 0.2 S/CO — SIGNIFICANT CHANGE UP (ref 0–0.99)
HCV AB SERPL-IMP: SIGNIFICANT CHANGE UP
HGB BLD-MCNC: 16.9 G/DL — SIGNIFICANT CHANGE UP (ref 13–17)
MAGNESIUM SERPL-MCNC: 2.2 MG/DL — SIGNIFICANT CHANGE UP (ref 1.6–2.6)
MCHC RBC-ENTMCNC: 30.6 PG — SIGNIFICANT CHANGE UP (ref 27–34)
MCHC RBC-ENTMCNC: 32.1 % — SIGNIFICANT CHANGE UP (ref 32–36)
MCV RBC AUTO: 95.3 FL — SIGNIFICANT CHANGE UP (ref 80–100)
NRBC # FLD: 0 K/UL — SIGNIFICANT CHANGE UP (ref 0–0)
PLATELET # BLD AUTO: 149 K/UL — LOW (ref 150–400)
PMV BLD: 8.6 FL — SIGNIFICANT CHANGE UP (ref 7–13)
POTASSIUM SERPL-MCNC: 4.5 MMOL/L — SIGNIFICANT CHANGE UP (ref 3.5–5.3)
POTASSIUM SERPL-SCNC: 4.5 MMOL/L — SIGNIFICANT CHANGE UP (ref 3.5–5.3)
RBC # BLD: 5.52 M/UL — SIGNIFICANT CHANGE UP (ref 4.2–5.8)
RBC # FLD: 15.6 % — HIGH (ref 10.3–14.5)
SODIUM SERPL-SCNC: 132 MMOL/L — LOW (ref 135–145)
TROPONIN T, HIGH SENSITIVITY: 3524 NG/L — CRITICAL HIGH (ref ?–14)
TROPONIN T, HIGH SENSITIVITY: 44 NG/L — SIGNIFICANT CHANGE UP (ref ?–14)
TROPONIN T, HIGH SENSITIVITY: 5327 NG/L — CRITICAL HIGH (ref ?–14)
TSH SERPL-MCNC: 4.23 UIU/ML — HIGH (ref 0.27–4.2)
WBC # BLD: 8.64 K/UL — SIGNIFICANT CHANGE UP (ref 3.8–10.5)
WBC # FLD AUTO: 8.64 K/UL — SIGNIFICANT CHANGE UP (ref 3.8–10.5)

## 2020-05-29 RX ORDER — SENNA PLUS 8.6 MG/1
2 TABLET ORAL ONCE
Refills: 0 | Status: COMPLETED | OUTPATIENT
Start: 2020-05-29 | End: 2020-05-29

## 2020-05-29 RX ORDER — METOPROLOL TARTRATE 50 MG
12.5 TABLET ORAL
Refills: 0 | Status: DISCONTINUED | OUTPATIENT
Start: 2020-05-29 | End: 2020-05-29

## 2020-05-29 RX ORDER — METOPROLOL TARTRATE 50 MG
12.5 TABLET ORAL
Refills: 0 | Status: DISCONTINUED | OUTPATIENT
Start: 2020-05-29 | End: 2020-05-30

## 2020-05-29 RX ORDER — SENNA PLUS 8.6 MG/1
2 TABLET ORAL AT BEDTIME
Refills: 0 | Status: DISCONTINUED | OUTPATIENT
Start: 2020-05-29 | End: 2020-05-30

## 2020-05-29 RX ORDER — LOSARTAN POTASSIUM 100 MG/1
25 TABLET, FILM COATED ORAL DAILY
Refills: 0 | Status: DISCONTINUED | OUTPATIENT
Start: 2020-05-29 | End: 2020-05-29

## 2020-05-29 RX ORDER — ONDANSETRON 8 MG/1
4 TABLET, FILM COATED ORAL ONCE
Refills: 0 | Status: COMPLETED | OUTPATIENT
Start: 2020-05-29 | End: 2020-05-29

## 2020-05-29 RX ORDER — POLYETHYLENE GLYCOL 3350 17 G/17G
17 POWDER, FOR SOLUTION ORAL DAILY
Refills: 0 | Status: DISCONTINUED | OUTPATIENT
Start: 2020-05-29 | End: 2020-05-30

## 2020-05-29 RX ADMIN — Medication 12.5 MILLIGRAM(S): at 16:59

## 2020-05-29 RX ADMIN — Medication 175 MICROGRAM(S): at 06:09

## 2020-05-29 RX ADMIN — SENNA PLUS 2 TABLET(S): 8.6 TABLET ORAL at 22:07

## 2020-05-29 RX ADMIN — SENNA PLUS 2 TABLET(S): 8.6 TABLET ORAL at 06:09

## 2020-05-29 RX ADMIN — POLYETHYLENE GLYCOL 3350 17 GRAM(S): 17 POWDER, FOR SOLUTION ORAL at 11:45

## 2020-05-29 RX ADMIN — TICAGRELOR 90 MILLIGRAM(S): 90 TABLET ORAL at 16:59

## 2020-05-29 RX ADMIN — ATORVASTATIN CALCIUM 80 MILLIGRAM(S): 80 TABLET, FILM COATED ORAL at 22:07

## 2020-05-29 RX ADMIN — Medication 400 UNIT(S): at 11:45

## 2020-05-29 RX ADMIN — Medication 81 MILLIGRAM(S): at 11:44

## 2020-05-29 RX ADMIN — TICAGRELOR 90 MILLIGRAM(S): 90 TABLET ORAL at 06:09

## 2020-05-29 RX ADMIN — ONDANSETRON 4 MILLIGRAM(S): 8 TABLET, FILM COATED ORAL at 02:47

## 2020-05-29 RX ADMIN — CHLORHEXIDINE GLUCONATE 1 APPLICATION(S): 213 SOLUTION TOPICAL at 06:09

## 2020-05-29 NOTE — PROGRESS NOTE ADULT - ASSESSMENT
60M with PMH of HTN, LALA, renal cell carcinoma with metastasis to lung s/p left nephrectomy and left upper lung lobectomy/wedge resections x 3, presenting with STEMI. S/p cardiac cath on 5/28 with balloon angioplasty and stent to 100% distal RCA.    NEURO:  -No active issues    RESP:  #LALA  -Previously used CPAP at home. Has been off CPAP recently after weight loss, improvement in breathing.    #RCC with mets to lung s/p lobectomy/wedge resections x 3  -Comfortable on room air, saturating well  -Continue to monitor    CV:  #STEMI  -Patient presenting with chest pain. EKG with ST elevations in leads II, III, aVF  -Loaded with ASA/Brilinta, heparin  -s/p LHC with balloon angioplasty and stent to 100% distal RCA  -Continue with ASA/Brilinta  -Start Atorvastatin 80mg daily  -hold home beta-blocker for HR in 50s, reintroduce as tolerated.  -start ACE/ARB as BP tolerates  -trend cardiac enzymes to peak: hsTnT 44 --> 5237,  --> 1681, CKMB 8.94 --> 149  -TTE 5/28 with EF 34%, moderate to severe segmental LV systolic dysfunction; Hypokinesis of the inferior wall, inferolateral wall, and inferoseptum    #HTN  -home meds: Norvasc 10mg, Metoprolol 25mg BID    GI:  -Diet: DASH    RENAL/:  #Renal cell carcinoma s/p left nephrectomy  -on chemotherapy (Votrient 600mg daily) -- hold Votrient  -serum Cr 1.65 in 1/2018, no recent baseline  -serum Cr on admission 1.59, continue to monitor  -Follows-up with Dr. Amanda Rice ([facility]) for oncology    ENDO:  -Check HbA1C    #Hypothyroidism  -continue with home Levothyroxine 175mcg daily    ID:  -COVID negative x 1    HEME:  -H/H elevated 18.2/56.1 on admission. H/H 17.1/50.3 in 1/2018, no recent baseline. H/o LALA.  -Continue to monitor 60M with PMH of HTN, LALA, renal cell carcinoma with metastasis to lung s/p left nephrectomy and left upper lung lobectomy/wedge resections x 3, presenting with STEMI. S/p cardiac cath on 5/28 with balloon angioplasty and stent to 100% distal RCA.    NEURO:  -No active issues    RESP:  #LALA  -Previously used CPAP at home. Has been off CPAP recently after weight loss, improvement in breathing.    #RCC with mets to lung s/p lobectomy/wedge resections x 3  -Comfortable on room air, saturating well  -Continue to monitor    CV:  #STEMI  -Patient presenting with chest pain. EKG with ST elevations in leads II, III, aVF  -Loaded with ASA/Brilinta, heparin  -s/p LHC with balloon angioplasty and stent to 100% distal RCA  -Continue with ASA/Brilinta  -Start Atorvastatin 80mg daily  -hold home beta-blocker for HR in 50s, reintroduce as tolerated.  -start ACE/ARB as BP tolerates  -trend cardiac enzymes to peak: hsTnT 44 --> 5237,  --> 1681, CKMB 8.94 --> 149  -TTE 5/28 with EF 34%, moderate to severe segmental LV systolic dysfunction; Hypokinesis of the inferior wall, inferolateral wall, and inferoseptum    #HTN  -home meds: Norvasc 10mg, Metoprolol 25mg BID    GI:  -Diet: DASH    RENAL/:  #Renal cell carcinoma s/p left nephrectomy  -on chemotherapy (Votrient 600mg daily) -- hold Votrient  -serum Cr 1.65 in 1/2018, per patient his baseline is 1.3-1.4  -serum Cr on admission 1.59, down to 1.4 this morning  -Follows-up with Dr. Amanda Rice ([facility]) for oncology    ENDO:  -Check HbA1C    #Hypothyroidism  -continue with home Levothyroxine 175mcg daily  -TSH wnl    ID:  -COVID negative x 1    HEME:  -H/H elevated on admission, now normalized. Continue to monitor 60M with PMH of HTN, LALA, renal cell carcinoma with metastasis to lung s/p left nephrectomy and left upper lung lobectomy/wedge resections x 3, presenting with STEMI. S/p cardiac cath on 5/28 with balloon angioplasty and stent to 100% distal RCA.    NEURO:  -No active issues    RESP:  #LALA  -Previously used CPAP at home. Has been off CPAP recently after weight loss, improvement in breathing.    #RCC with mets to lung s/p lobectomy/wedge resections x 3  -Comfortable on room air, saturating well  -Continue to monitor    CV:  #STEMI  -Patient presenting with chest pain. EKG with ST elevations in leads II, III, aVF  -Loaded with ASA/Brilinta, heparin  -s/p LHC with balloon angioplasty and stent to 100% distal RCA  -Continue with ASA/Brilinta  -Start Atorvastatin 80mg daily  -Resume metoprolol tartrate at lower dose 12.5mg BID, hold parameters for HR  -start ACE/ARB as BP tolerates  -trend cardiac enzymes to peak: hsTnT 44 --> 5237,  --> 1681, CKMB 8.94 --> 149  -TTE 5/28 with EF 34%, moderate to severe segmental LV systolic dysfunction; Hypokinesis of the inferior wall, inferolateral wall, and inferoseptum    #HTN  -home meds: Norvasc 10mg, Metoprolol 25mg BID  -Resume lower dose Metoprolol 12.5 BID as above    GI:  -Diet: DASH  -Bowel regimen: senna, miralax    RENAL/:  #Renal cell carcinoma s/p left nephrectomy  -on chemotherapy (Votrient 600mg daily) -- hold Votrient  -serum Cr 1.65 in 1/2018, per patient his baseline is 1.3-1.4  -serum Cr on admission 1.59, down to 1.4 this morning  -Follows-up with Dr. Amanda Rice ([facility]) for oncology    ENDO:  -Check HbA1C    #Hypothyroidism  -continue with home Levothyroxine 175mcg daily  -TSH wnl    ID:  -COVID negative x 1    HEME:  -H/H elevated on admission, now normalized. Continue to monitor

## 2020-05-29 NOTE — PROGRESS NOTE ADULT - SUBJECTIVE AND OBJECTIVE BOX
Elke Perales PGY -1  Internal Medicine  Pager #: [phone] / [facility] [phone]    PATIENT: MELI ARREOLA | [id number]    CHIEF COMPLAINT: MELI ARREOLA is a 60y w/ PMHx of HTN, LALA, renal cell carcinoma with metastasis to lung s/p left nephrectomy and left upper lung lobectomy/wedge resections x 3, who is admitted to CCU for STEMI.    INTERVAL HPI / OVERNIGHT EVENTS:  -Overnight patient with runs of NSVT up to 14 beats. Patient also with episode of nausea, given Zofran 4mg IV x 1.    MEDICATIONS:  MEDICATIONS  (STANDING):  aspirin enteric coated 81 milliGRAM(s) Oral daily  atorvastatin 80 milliGRAM(s) Oral at bedtime  chlorhexidine 4% Liquid 1 Application(s) Topical <User Schedule>  cholecalciferol 400 Unit(s) Oral daily  levothyroxine 175 MICROGram(s) Oral daily  ticagrelor 90 milliGRAM(s) Oral every 12 hours    MEDICATIONS  (PRN):      ALLERGIES:  Allergies    No Known Allergies    Intolerances        OBJECTIVE:  ICU Vital Signs Last 24 Hrs  T(C): 36.9 (29 May 2020 04:00), Max: 36.9 (28 May 2020 12:21)  T(F): 98.4 (29 May 2020 04:00), Max: 98.4 (28 May 2020 12:21)  HR: 64 (29 May 2020 08:00) (53 - 78)  BP: 126/82 (29 May 2020 08:00) (119/79 - 172/107)  BP(mean): 92 (29 May 2020 08:00) (88 - 110)  ABP: --  ABP(mean): --  RR: 18 (29 May 2020 08:00) (11 - 24)  SpO2: 98% (29 May 2020 05:00) (93% - 100%)      Adult Advanced Hemodynamics Last 24 Hrs  CVP(mm Hg): --  CVP(cm H2O): --  CO: --  CI: --  PA: --  PA(mean): --  PCWP: --  SVR: --  SVRI: --  PVR: --  PVRI: --  CAPILLARY BLOOD GLUCOSE        CAPILLARY BLOOD GLUCOSE        I&O's Summary    28 May 2020 07:01  -  29 May 2020 07:00  --------------------------------------------------------  IN: 160 mL / OUT: 850 mL / NET: -690 mL      Daily Height in cm: 167.64 (28 May 2020 14:15)    Daily Weight in k.2 (29 May 2020 06:00)    PHYSICAL EXAMINATION:  GENERAL: NAD  NEURO: A & O x 3, no focal neurologic deficits  HEENT: PERRL, EOMI  NECK: Supple, no JVD  PULM:  CTA B/L no crackles, rales or wheezes  CARD: RRR, +S1, +S2, No M/R/G  ABD: soft, ND, +BS, NTTP  EXT: warm, no pedal edema, good distal pulses  SKIN: No rashes, no hematoma  LINES:  PIV    LABS:                          16.9   8.64  )-----------( 149      ( 29 May 2020 04:00 )             52.6         132<L>  |  101  |  30<H>  ----------------------------<  115<H>  4.5   |  19<L>  |  1.42<H>    Ca    8.7      29 May 2020 04:00  Mg     2.2         TPro  7.8  /  Alb  4.5  /  TBili  0.6  /  DBili  x   /  AST  40  /  ALT  25  /  AlkPhos  53      LIVER FUNCTIONS - ( 28 May 2020 12:18 )  Alb: 4.5 g/dL / Pro: 7.8 g/dL / ALK PHOS: 53 u/L / ALT: 25 u/L / AST: 40 u/L / GGT: x           PT/INR - ( 28 May 2020 12:18 )   PT: 10.3 SEC;   INR: 0.90          PTT - ( 28 May 2020 12:18 )  PTT:38.2 SEC  Creatine Kinase, Serum: 1681 u/L ( @ 04:00)  CKMB: 149.80 ng/mL ( @ 04:00)  CKMB Relative Index: 8.9 ( @ 04:00)  Creatine Kinase, Serum: 237 u/L ( @ 12:18)  CKMB: 8.94 ng/mL ( @ 12:18)  CKMB Relative Index: 3.8 ( @ 12:18)    CARDIAC MARKERS ( 29 May 2020 04:00 )  x     / x     / 1681 u/L / 149.80 ng/mL / x      CARDIAC MARKERS ( 28 May 2020 12:18 )  x     / x     / 237 u/L / 8.94 ng/mL / x              TELEMETRY:   EKG:   IMAGING: Elke Perales PGY -1  Internal Medicine  Pager #: [phone] / [facility] [phone]    PATIENT: MELI ARREOLA | [id number]    CHIEF COMPLAINT: MELI ARREOLA is a 60y w/ PMHx of HTN, LALA, renal cell carcinoma with metastasis to lung s/p left nephrectomy and left upper lung lobectomy/wedge resections x 3, who is admitted to CCU for STEMI.    INTERVAL HPI / OVERNIGHT EVENTS:  -Overnight patient with runs of NSVT up to 14 beats. Patient also with episode of nausea, given Zofran 4mg IV x 1.  -This morning patient feels well. Denies chest pain, palpitations. Occasionally feels like he is breathing more quickly, which seems to sometimes correspond with his runs of NSVT. Patient attributes to earlier nausea to feeling constipated, has not had BM since yesterday morning. Denies fever/chills, cough, abd pain, n/v/d, extremity pain/swelling.    MEDICATIONS:  MEDICATIONS  (STANDING):  aspirin enteric coated 81 milliGRAM(s) Oral daily  atorvastatin 80 milliGRAM(s) Oral at bedtime  chlorhexidine 4% Liquid 1 Application(s) Topical <User Schedule>  cholecalciferol 400 Unit(s) Oral daily  levothyroxine 175 MICROGram(s) Oral daily  ticagrelor 90 milliGRAM(s) Oral every 12 hours    MEDICATIONS  (PRN):      ALLERGIES:  Allergies    No Known Allergies    Intolerances        OBJECTIVE:  ICU Vital Signs Last 24 Hrs  T(C): 36.9 (29 May 2020 04:00), Max: 36.9 (28 May 2020 12:21)  T(F): 98.4 (29 May 2020 04:00), Max: 98.4 (28 May 2020 12:21)  HR: 64 (29 May 2020 08:00) (53 - 78)  BP: 126/82 (29 May 2020 08:00) (119/79 - 172/107)  BP(mean): 92 (29 May 2020 08:00) (88 - 110)  ABP: --  ABP(mean): --  RR: 18 (29 May 2020 08:00) (11 - 24)  SpO2: 98% (29 May 2020 05:00) (93% - 100%)      Adult Advanced Hemodynamics Last 24 Hrs  CVP(mm Hg): --  CVP(cm H2O): --  CO: --  CI: --  PA: --  PA(mean): --  PCWP: --  SVR: --  SVRI: --  PVR: --  PVRI: --  CAPILLARY BLOOD GLUCOSE        CAPILLARY BLOOD GLUCOSE        I&O's Summary    28 May 2020 07:01  -  29 May 2020 07:00  --------------------------------------------------------  IN: 160 mL / OUT: 850 mL / NET: -690 mL      Daily Height in cm: 167.64 (28 May 2020 14:15)    Daily Weight in k.2 (29 May 2020 06:00)    PHYSICAL EXAMINATION:  GENERAL: NAD  NEURO: A & O x 3, no focal neurologic deficits  HEENT: PERRL, EOMI  NECK: Supple, no JVD  PULM:  CTA B/L no crackles, rales or wheezes  CARD: RRR, +S1, +S2, No M/R/G  ABD: soft, ND, +BS, NTTP  EXT: warm, no pedal edema, good distal pulses  SKIN: No rashes, no hematoma at right groin site  LINES:  PIV    LABS:                          16.9   8.64  )-----------( 149      ( 29 May 2020 04:00 )             52.6     05-29    132<L>  |  101  |  30<H>  ----------------------------<  115<H>  4.5   |  19<L>  |  1.42<H>    Ca    8.7      29 May 2020 04:00  Mg     2.2     -    TPro  7.8  /  Alb  4.5  /  TBili  0.6  /  DBili  x   /  AST  40  /  ALT  25  /  AlkPhos  53  05-28    LIVER FUNCTIONS - ( 28 May 2020 12:18 )  Alb: 4.5 g/dL / Pro: 7.8 g/dL / ALK PHOS: 53 u/L / ALT: 25 u/L / AST: 40 u/L / GGT: x           PT/INR - ( 28 May 2020 12:18 )   PT: 10.3 SEC;   INR: 0.90          PTT - ( 28 May 2020 12:18 )  PTT:38.2 SEC  Creatine Kinase, Serum: 1681 u/L ( @ 04:00)  CKMB: 149.80 ng/mL ( @ 04:00)  CKMB Relative Index: 8.9 ( @ 04:00)  Creatine Kinase, Serum: 237 u/L ( @ 12:18)  CKMB: 8.94 ng/mL ( @ 12:18)  CKMB Relative Index: 3.8 ( @ 12:18)    CARDIAC MARKERS ( 29 May 2020 04:00 )  x     / x     / 1681 u/L / 149.80 ng/mL / x      CARDIAC MARKERS ( 28 May 2020 12:18 )  x     / x     / 237 u/L / 8.94 ng/mL / x              TELEMETRY:   EKG:   IMAGING:

## 2020-05-29 NOTE — PROGRESS NOTE ADULT - SUBJECTIVE AND OBJECTIVE BOX
CARDIOLOGY FOLLOW UP - Dr. Toussaint    CC   no cp/sob   c/o mild nausea       PHYSICAL EXAM:  T(C): 36.9 (05-29-20 @ 08:00), Max: 36.9 (05-28-20 @ 12:21)  HR: 74 (05-29-20 @ 11:00) (53 - 78)  BP: 122/90 (05-29-20 @ 11:00) (119/79 - 172/107)  RR: 17 (05-29-20 @ 11:00) (11 - 24)  SpO2: 97% (05-29-20 @ 11:00) (93% - 100%)  Wt(kg): --  I&O's Summary    28 May 2020 07:01  -  29 May 2020 07:00  --------------------------------------------------------  IN: 160 mL / OUT: 850 mL / NET: -690 mL    29 May 2020 07:01  -  29 May 2020 11:23  --------------------------------------------------------  IN: 240 mL / OUT: 0 mL / NET: 240 mL        Appearance: Normal	  Cardiovascular: Normal S1 S2,RRR, No JVD, No murmurs  Respiratory: Lungs clear to auscultation	  Gastrointestinal:  Soft, Non-tender, + BS	  Extremities: Normal range of motion, No clubbing, cyanosis or edema        MEDICATIONS  (STANDING):  aspirin enteric coated 81 milliGRAM(s) Oral daily  atorvastatin 80 milliGRAM(s) Oral at bedtime  chlorhexidine 4% Liquid 1 Application(s) Topical <User Schedule>  cholecalciferol 400 Unit(s) Oral daily  levothyroxine 175 MICROGram(s) Oral daily  metoprolol tartrate 12.5 milliGRAM(s) Oral two times a day  polyethylene glycol 3350 17 Gram(s) Oral daily  senna 2 Tablet(s) Oral at bedtime  ticagrelor 90 milliGRAM(s) Oral every 12 hours      TELEMETRY: NSVT up to 14 beats, NSR 	    ECG:  	  RADIOLOGY:   DIAGNOSTIC TESTING:  [ ] Echocardiogram:  [ ]  Catheterization:  [ ] Stress Test:    OTHER: 	    LABS:	 	        CKMB: 149.80 ng/mL (05-29 @ 04:00)  CKMB: 8.94 ng/mL (05-28 @ 12:18)                          16.9   8.64  )-----------( 149      ( 29 May 2020 04:00 )             52.6     05-29    132<L>  |  101  |  30<H>  ----------------------------<  115<H>  4.5   |  19<L>  |  1.42<H>    Ca    8.7      29 May 2020 04:00  Mg     2.2     05-29    TPro  7.8  /  Alb  4.5  /  TBili  0.6  /  DBili  x   /  AST  40  /  ALT  25  /  AlkPhos  53  05-28    PT/INR - ( 28 May 2020 12:18 )   PT: 10.3 SEC;   INR: 0.90          PTT - ( 28 May 2020 12:18 )  PTT:38.2 SEC

## 2020-05-29 NOTE — PROGRESS NOTE ADULT - ASSESSMENT
60 year old man with history of HTN, LALA, renal cell carcinoma with metastasis to lung s/p left nephrectomy and left upper lung lobectomy/wedge resection (?), admitted acute inferior wall STEMi    1. Inferior Stemi  -s/p HERNAN x 2 to distal RCA  -hd stable, resolved symptoms  -moderate LV dysfxn  -cont ASA, brilinta, statin   -trend cardiac enzymes  -TTE 5/28 with EF 34%, moderate to severe segmental LV systolic dysfunction; Hypokinesis of the inferior wall, inferolateral wall, and inferoseptum  -bb resumed   -eventual ACE/ARB as BP tolerates    2. Ischemic Cardiomyopathy  -stable, no clinical hf, diuretic need  -continue bb   -TTE 5/28 with EF 34%, moderate to severe segmental LV systolic dysfunction; Hypokinesis of the inferior wall, inferolateral wall, and inferoseptum    3. CKD  -trend creat, improved today     4. Renal Cell CA  -stable, outpt onc f/u  -chemo on hold     dvt ppx

## 2020-05-30 ENCOUNTER — TRANSCRIPTION ENCOUNTER (OUTPATIENT)
Age: 60
End: 2020-05-30

## 2020-05-30 VITALS
DIASTOLIC BLOOD PRESSURE: 85 MMHG | SYSTOLIC BLOOD PRESSURE: 132 MMHG | RESPIRATION RATE: 26 BRPM | HEART RATE: 104 BPM | OXYGEN SATURATION: 97 %

## 2020-05-30 LAB
ANION GAP SERPL CALC-SCNC: 15 MMO/L — HIGH (ref 7–14)
BUN SERPL-MCNC: 26 MG/DL — HIGH (ref 7–23)
CALCIUM SERPL-MCNC: 9.1 MG/DL — SIGNIFICANT CHANGE UP (ref 8.4–10.5)
CHLORIDE SERPL-SCNC: 104 MMOL/L — SIGNIFICANT CHANGE UP (ref 98–107)
CK MB BLD-MCNC: 15.44 NG/ML — HIGH (ref 1–6.6)
CK MB BLD-MCNC: 2.9 — HIGH (ref 0–2.5)
CK SERPL-CCNC: 534 U/L — HIGH (ref 30–200)
CO2 SERPL-SCNC: 22 MMOL/L — SIGNIFICANT CHANGE UP (ref 22–31)
CREAT SERPL-MCNC: 1.49 MG/DL — HIGH (ref 0.5–1.3)
GLUCOSE SERPL-MCNC: 102 MG/DL — HIGH (ref 70–99)
HCT VFR BLD CALC: 53.5 % — HIGH (ref 39–50)
HGB BLD-MCNC: 17.1 G/DL — HIGH (ref 13–17)
MAGNESIUM SERPL-MCNC: 2.4 MG/DL — SIGNIFICANT CHANGE UP (ref 1.6–2.6)
MCHC RBC-ENTMCNC: 31 PG — SIGNIFICANT CHANGE UP (ref 27–34)
MCHC RBC-ENTMCNC: 32 % — SIGNIFICANT CHANGE UP (ref 32–36)
MCV RBC AUTO: 97.1 FL — SIGNIFICANT CHANGE UP (ref 80–100)
NRBC # FLD: 0 K/UL — SIGNIFICANT CHANGE UP (ref 0–0)
PHOSPHATE SERPL-MCNC: 2.9 MG/DL — SIGNIFICANT CHANGE UP (ref 2.5–4.5)
PLATELET # BLD AUTO: 163 K/UL — SIGNIFICANT CHANGE UP (ref 150–400)
PMV BLD: 9.2 FL — SIGNIFICANT CHANGE UP (ref 7–13)
POTASSIUM SERPL-MCNC: 4.4 MMOL/L — SIGNIFICANT CHANGE UP (ref 3.5–5.3)
POTASSIUM SERPL-SCNC: 4.4 MMOL/L — SIGNIFICANT CHANGE UP (ref 3.5–5.3)
RBC # BLD: 5.51 M/UL — SIGNIFICANT CHANGE UP (ref 4.2–5.8)
RBC # FLD: 16.2 % — HIGH (ref 10.3–14.5)
SODIUM SERPL-SCNC: 141 MMOL/L — SIGNIFICANT CHANGE UP (ref 135–145)
TROPONIN T, HIGH SENSITIVITY: 3144 NG/L — CRITICAL HIGH (ref ?–14)
WBC # BLD: 7.75 K/UL — SIGNIFICANT CHANGE UP (ref 3.8–10.5)
WBC # FLD AUTO: 7.75 K/UL — SIGNIFICANT CHANGE UP (ref 3.8–10.5)

## 2020-05-30 RX ORDER — LOSARTAN POTASSIUM 100 MG/1
25 TABLET, FILM COATED ORAL DAILY
Refills: 0 | Status: DISCONTINUED | OUTPATIENT
Start: 2020-05-30 | End: 2020-05-30

## 2020-05-30 RX ORDER — ATORVASTATIN CALCIUM 80 MG/1
1 TABLET, FILM COATED ORAL
Qty: 30 | Refills: 0
Start: 2020-05-30 | End: 2020-06-28

## 2020-05-30 RX ORDER — AMLODIPINE BESYLATE 2.5 MG/1
1 TABLET ORAL
Qty: 0 | Refills: 0 | DISCHARGE

## 2020-05-30 RX ORDER — LOSARTAN POTASSIUM 100 MG/1
1 TABLET, FILM COATED ORAL
Qty: 30 | Refills: 0
Start: 2020-05-30 | End: 2020-06-28

## 2020-05-30 RX ORDER — METOPROLOL TARTRATE 50 MG
1 TABLET ORAL
Qty: 30 | Refills: 0
Start: 2020-05-30 | End: 2020-06-28

## 2020-05-30 RX ORDER — ASPIRIN/CALCIUM CARB/MAGNESIUM 324 MG
1 TABLET ORAL
Qty: 30 | Refills: 0
Start: 2020-05-30 | End: 2020-06-28

## 2020-05-30 RX ORDER — TICAGRELOR 90 MG/1
1 TABLET ORAL
Qty: 60 | Refills: 0
Start: 2020-05-30 | End: 2020-06-28

## 2020-05-30 RX ORDER — METOPROLOL TARTRATE 50 MG
1 TABLET ORAL
Qty: 0 | Refills: 0 | DISCHARGE

## 2020-05-30 RX ADMIN — Medication 81 MILLIGRAM(S): at 11:39

## 2020-05-30 RX ADMIN — LOSARTAN POTASSIUM 25 MILLIGRAM(S): 100 TABLET, FILM COATED ORAL at 11:39

## 2020-05-30 RX ADMIN — Medication 400 UNIT(S): at 11:39

## 2020-05-30 RX ADMIN — CHLORHEXIDINE GLUCONATE 1 APPLICATION(S): 213 SOLUTION TOPICAL at 11:40

## 2020-05-30 RX ADMIN — Medication 12.5 MILLIGRAM(S): at 06:03

## 2020-05-30 RX ADMIN — Medication 175 MICROGRAM(S): at 06:03

## 2020-05-30 RX ADMIN — TICAGRELOR 90 MILLIGRAM(S): 90 TABLET ORAL at 06:03

## 2020-05-30 NOTE — PROGRESS NOTE ADULT - SUBJECTIVE AND OBJECTIVE BOX
Elke Perales PGY -1  Internal Medicine  Pager #: [phone] / [facility] [phone]    PATIENT: MELI ARREOLA | [id number]    CHIEF COMPLAINT: MELI ARREOLA is a 60y w/ PMHx of HTN, LALA, renal cell carcinoma with metastasis to lung s/p left nephrectomy and left upper lung lobectomy/wedge resections x 3, who is admitted to CCU for STEMI.    INTERVAL HPI / OVERNIGHT EVENTS:  -No acute overnight events.    MEDICATIONS:  MEDICATIONS  (STANDING):  aspirin enteric coated 81 milliGRAM(s) Oral daily  atorvastatin 80 milliGRAM(s) Oral at bedtime  chlorhexidine 4% Liquid 1 Application(s) Topical <User Schedule>  cholecalciferol 400 Unit(s) Oral daily  levothyroxine 175 MICROGram(s) Oral daily  metoprolol tartrate 12.5 milliGRAM(s) Oral two times a day  polyethylene glycol 3350 17 Gram(s) Oral daily  senna 2 Tablet(s) Oral at bedtime  ticagrelor 90 milliGRAM(s) Oral every 12 hours    MEDICATIONS  (PRN):      ALLERGIES:  Allergies    No Known Allergies    Intolerances        OBJECTIVE:  ICU Vital Signs Last 24 Hrs  T(C): 36.8 (30 May 2020 05:00), Max: 37.3 (29 May 2020 16:00)  T(F): 98.2 (30 May 2020 05:00), Max: 99.2 (29 May 2020 16:00)  HR: 75 (30 May 2020 07:00) (64 - 103)  BP: 121/83 (30 May 2020 06:00) (115/85 - 133/89)  BP(mean): 93 (30 May 2020 06:00) (89 - 99)  ABP: --  ABP(mean): --  RR: 16 (30 May 2020 07:00) (13 - 24)  SpO2: 94% (30 May 2020 06:00) (93% - 98%)      Adult Advanced Hemodynamics Last 24 Hrs  CVP(mm Hg): --  CVP(cm H2O): --  CO: --  CI: --  PA: --  PA(mean): --  PCWP: --  SVR: --  SVRI: --  PVR: --  PVRI: --  CAPILLARY BLOOD GLUCOSE        CAPILLARY BLOOD GLUCOSE        I&O's Summary    29 May 2020 07:01  -  30 May 2020 07:00  --------------------------------------------------------  IN: 720 mL / OUT: 0 mL / NET: 720 mL      Daily     Daily     PHYSICAL EXAMINATION:  GENERAL: NAD  NEURO: A & O x 3, no focal neurologic deficits  HEENT: PERRL, EOMI  NECK: Supple, no JVD  PULM:  CTA B/L no crackles, rales or wheezes  CARD: RRR, +S1, +S2, No M/R/G  ABD: soft, ND, +BS, NTTP  EXT: warm/cold, #+ pedal edema, good distal pulses  SKIN: No rashes, no hematoma  LINES:  R/L  *** A line c/d/i, [place]    LABS:                          17.1   7.75  )-----------( 163      ( 30 May 2020 05:15 )             53.5     05-30    141  |  104  |  26<H>  ----------------------------<  102<H>  4.4   |  22  |  1.49<H>    Ca    9.1      30 May 2020 05:15  Phos  2.9     05-30  Mg     2.4     05-30    TPro  7.8  /  Alb  4.5  /  TBili  0.6  /  DBili  x   /  AST  40  /  ALT  25  /  AlkPhos  53  05-28    LIVER FUNCTIONS - ( 28 May 2020 12:18 )  Alb: 4.5 g/dL / Pro: 7.8 g/dL / ALK PHOS: 53 u/L / ALT: 25 u/L / AST: 40 u/L / GGT: x           PT/INR - ( 28 May 2020 12:18 )   PT: 10.3 SEC;   INR: 0.90          PTT - ( 28 May 2020 12:18 )  PTT:38.2 SEC  Creatine Kinase, Serum: 534 u/L (05-30 @ 05:15)  CKMB: 15.44 ng/mL (05-30 @ 05:15)  CKMB Relative Index: 2.9 (05-30 @ 05:15)  Creatine Kinase, Serum: 1113 u/L (05-29 @ 11:50)  CKMB: 72.22 ng/mL (05-29 @ 11:50)  CKMB Relative Index: 6.5 (05-29 @ 11:50)    CARDIAC MARKERS ( 30 May 2020 05:15 )  x     / x     / 534 u/L / 15.44 ng/mL / x      CARDIAC MARKERS ( 29 May 2020 11:50 )  x     / x     / 1113 u/L / 72.22 ng/mL / x      CARDIAC MARKERS ( 29 May 2020 04:00 )  x     / x     / 1681 u/L / 149.80 ng/mL / x      CARDIAC MARKERS ( 28 May 2020 12:18 )  x     / x     / 237 u/L / 8.94 ng/mL / x              TELEMETRY:   EKG:   IMAGING: Elke Perales PGY -1  Internal Medicine  Pager #: [phone] / [facility] [phone]    PATIENT: EMLI ARREOLA | [id number]    CHIEF COMPLAINT: MELI ARREOLA is a 60y w/ PMHx of HTN, LALA, renal cell carcinoma with metastasis to lung s/p left nephrectomy and left upper lung lobectomy/wedge resections x 3, who is admitted to CCU for STEMI.    INTERVAL HPI / OVERNIGHT EVENTS:  -No acute overnight events.    MEDICATIONS:  MEDICATIONS  (STANDING):  aspirin enteric coated 81 milliGRAM(s) Oral daily  atorvastatin 80 milliGRAM(s) Oral at bedtime  chlorhexidine 4% Liquid 1 Application(s) Topical <User Schedule>  cholecalciferol 400 Unit(s) Oral daily  levothyroxine 175 MICROGram(s) Oral daily  metoprolol tartrate 12.5 milliGRAM(s) Oral two times a day  polyethylene glycol 3350 17 Gram(s) Oral daily  senna 2 Tablet(s) Oral at bedtime  ticagrelor 90 milliGRAM(s) Oral every 12 hours    MEDICATIONS  (PRN):      ALLERGIES:  Allergies    No Known Allergies    Intolerances        OBJECTIVE:  ICU Vital Signs Last 24 Hrs  T(C): 36.8 (30 May 2020 05:00), Max: 37.3 (29 May 2020 16:00)  T(F): 98.2 (30 May 2020 05:00), Max: 99.2 (29 May 2020 16:00)  HR: 75 (30 May 2020 07:00) (64 - 103)  BP: 121/83 (30 May 2020 06:00) (115/85 - 133/89)  BP(mean): 93 (30 May 2020 06:00) (89 - 99)  ABP: --  ABP(mean): --  RR: 16 (30 May 2020 07:00) (13 - 24)  SpO2: 94% (30 May 2020 06:00) (93% - 98%)      Adult Advanced Hemodynamics Last 24 Hrs  CVP(mm Hg): --  CVP(cm H2O): --  CO: --  CI: --  PA: --  PA(mean): --  PCWP: --  SVR: --  SVRI: --  PVR: --  PVRI: --  CAPILLARY BLOOD GLUCOSE        CAPILLARY BLOOD GLUCOSE        I&O's Summary    29 May 2020 07:01  -  30 May 2020 07:00  --------------------------------------------------------  IN: 720 mL / OUT: 0 mL / NET: 720 mL      Daily     Daily     PHYSICAL EXAMINATION:  GENERAL: NAD  NEURO: A & O x 3, no focal neurologic deficits  HEENT: PERRL, EOMI  NECK: Supple, no JVD  PULM:  CTA B/L no crackles, rales or wheezes  CARD: RRR, +S1, +S2, No M/R/G  ABD: soft, ND, +BS, NTTP  EXT: warm, no pedal edema, good distal pulses  SKIN: No rashes, no hematoma at right groin site  LINES:  PIV    LABS:                          17.1   7.75  )-----------( 163      ( 30 May 2020 05:15 )             53.5     05-30    141  |  104  |  26<H>  ----------------------------<  102<H>  4.4   |  22  |  1.49<H>    Ca    9.1      30 May 2020 05:15  Phos  2.9     05-30  Mg     2.4     05-30    TPro  7.8  /  Alb  4.5  /  TBili  0.6  /  DBili  x   /  AST  40  /  ALT  25  /  AlkPhos  53  05-28    LIVER FUNCTIONS - ( 28 May 2020 12:18 )  Alb: 4.5 g/dL / Pro: 7.8 g/dL / ALK PHOS: 53 u/L / ALT: 25 u/L / AST: 40 u/L / GGT: x           PT/INR - ( 28 May 2020 12:18 )   PT: 10.3 SEC;   INR: 0.90          PTT - ( 28 May 2020 12:18 )  PTT:38.2 SEC  Creatine Kinase, Serum: 534 u/L (05-30 @ 05:15)  CKMB: 15.44 ng/mL (05-30 @ 05:15)  CKMB Relative Index: 2.9 (05-30 @ 05:15)  Creatine Kinase, Serum: 1113 u/L (05-29 @ 11:50)  CKMB: 72.22 ng/mL (05-29 @ 11:50)  CKMB Relative Index: 6.5 (05-29 @ 11:50)    CARDIAC MARKERS ( 30 May 2020 05:15 )  x     / x     / 534 u/L / 15.44 ng/mL / x      CARDIAC MARKERS ( 29 May 2020 11:50 )  x     / x     / 1113 u/L / 72.22 ng/mL / x      CARDIAC MARKERS ( 29 May 2020 04:00 )  x     / x     / 1681 u/L / 149.80 ng/mL / x      CARDIAC MARKERS ( 28 May 2020 12:18 )  x     / x     / 237 u/L / 8.94 ng/mL / x              TELEMETRY:   EKG:   IMAGING: Elke Perales PGY -1  Internal Medicine  Pager #: [facility] [phone] / [facility] [phone]    PATIENT: MELI ARREOLA | [id number]    CHIEF COMPLAINT: MELI ARREOLA is a 60y w/ PMHx of HTN, LALA, renal cell carcinoma with metastasis to lung s/p left nephrectomy and left upper lung lobectomy/wedge resections x 3, who is admitted to CCU for STEMI.    INTERVAL HPI / OVERNIGHT EVENTS:  -No acute overnight events.  -Patient reports doing well. Denies chest pain, SOB, palpitations. No further episodes of the unusual breathing that he felt the overnight.    MEDICATIONS:  MEDICATIONS  (STANDING):  aspirin enteric coated 81 milliGRAM(s) Oral daily  atorvastatin 80 milliGRAM(s) Oral at bedtime  chlorhexidine 4% Liquid 1 Application(s) Topical <User Schedule>  cholecalciferol 400 Unit(s) Oral daily  levothyroxine 175 MICROGram(s) Oral daily  metoprolol tartrate 12.5 milliGRAM(s) Oral two times a day  polyethylene glycol 3350 17 Gram(s) Oral daily  senna 2 Tablet(s) Oral at bedtime  ticagrelor 90 milliGRAM(s) Oral every 12 hours    MEDICATIONS  (PRN):      ALLERGIES:  Allergies    No Known Allergies    Intolerances        OBJECTIVE:  ICU Vital Signs Last 24 Hrs  T(C): 36.8 (30 May 2020 05:00), Max: 37.3 (29 May 2020 16:00)  T(F): 98.2 (30 May 2020 05:00), Max: 99.2 (29 May 2020 16:00)  HR: 75 (30 May 2020 07:00) (64 - 103)  BP: 121/83 (30 May 2020 06:00) (115/85 - 133/89)  BP(mean): 93 (30 May 2020 06:00) (89 - 99)  ABP: --  ABP(mean): --  RR: 16 (30 May 2020 07:00) (13 - 24)  SpO2: 94% (30 May 2020 06:00) (93% - 98%)      Adult Advanced Hemodynamics Last 24 Hrs  CVP(mm Hg): --  CVP(cm H2O): --  CO: --  CI: --  PA: --  PA(mean): --  PCWP: --  SVR: --  SVRI: --  PVR: --  PVRI: --  CAPILLARY BLOOD GLUCOSE        CAPILLARY BLOOD GLUCOSE        I&O's Summary    29 May 2020 07:01  -  30 May 2020 07:00  --------------------------------------------------------  IN: 720 mL / OUT: 0 mL / NET: 720 mL      Daily     Daily     PHYSICAL EXAMINATION:  GENERAL: NAD  NEURO: A & O x 3, no focal neurologic deficits  HEENT: PERRL, EOMI  NECK: Supple, no JVD  PULM:  CTA B/L no crackles, rales or wheezes  CARD: RRR, +S1, +S2, No M/R/G  ABD: soft, ND, +BS, NTTP  EXT: warm, no pedal edema, good distal pulses  SKIN: No rashes, no hematoma at right groin site  LINES:  PIV    LABS:                          17.1   7.75  )-----------( 163      ( 30 May 2020 05:15 )             53.5     05-30    141  |  104  |  26<H>  ----------------------------<  102<H>  4.4   |  22  |  1.49<H>    Ca    9.1      30 May 2020 05:15  Phos  2.9     05-30  Mg     2.4     05-30    TPro  7.8  /  Alb  4.5  /  TBili  0.6  /  DBili  x   /  AST  40  /  ALT  25  /  AlkPhos  53  05-28    LIVER FUNCTIONS - ( 28 May 2020 12:18 )  Alb: 4.5 g/dL / Pro: 7.8 g/dL / ALK PHOS: 53 u/L / ALT: 25 u/L / AST: 40 u/L / GGT: x           PT/INR - ( 28 May 2020 12:18 )   PT: 10.3 SEC;   INR: 0.90          PTT - ( 28 May 2020 12:18 )  PTT:38.2 SEC  Creatine Kinase, Serum: 534 u/L (05-30 @ 05:15)  CKMB: 15.44 ng/mL (05-30 @ 05:15)  CKMB Relative Index: 2.9 (05-30 @ 05:15)  Creatine Kinase, Serum: 1113 u/L (05-29 @ 11:50)  CKMB: 72.22 ng/mL (05-29 @ 11:50)  CKMB Relative Index: 6.5 (05-29 @ 11:50)    CARDIAC MARKERS ( 30 May 2020 05:15 )  x     / x     / 534 u/L / 15.44 ng/mL / x      CARDIAC MARKERS ( 29 May 2020 11:50 )  x     / x     / 1113 u/L / 72.22 ng/mL / x      CARDIAC MARKERS ( 29 May 2020 04:00 )  x     / x     / 1681 u/L / 149.80 ng/mL / x      CARDIAC MARKERS ( 28 May 2020 12:18 )  x     / x     / 237 u/L / 8.94 ng/mL / x              TELEMETRY:   EKG:   IMAGING: Elke Perales PGY -1  Internal Medicine  Pager #: [facility] [phone] / [facility] [phone]    PATIENT: MELI ARREOLA | [id number]    CHIEF COMPLAINT: EMLI ARREOLA is a 60y w/ PMHx of HTN, LALA, renal cell carcinoma with metastasis to lung s/p left nephrectomy and left upper lung lobectomy/wedge resections x 3, who is admitted to CCU for STEMI.    INTERVAL HPI / OVERNIGHT EVENTS:  -No acute overnight events.  -Patient reports doing well. Denies chest pain, SOB, palpitations. No further episodes of the unusual breathing that he felt the overnight. Denies lighthead/dizziness, fever/chills, abd pain, n/v/d, urinary complaints, extremity pain/swelling. Had BM yesterday.    MEDICATIONS:  MEDICATIONS  (STANDING):  aspirin enteric coated 81 milliGRAM(s) Oral daily  atorvastatin 80 milliGRAM(s) Oral at bedtime  chlorhexidine 4% Liquid 1 Application(s) Topical <User Schedule>  cholecalciferol 400 Unit(s) Oral daily  levothyroxine 175 MICROGram(s) Oral daily  metoprolol tartrate 12.5 milliGRAM(s) Oral two times a day  polyethylene glycol 3350 17 Gram(s) Oral daily  senna 2 Tablet(s) Oral at bedtime  ticagrelor 90 milliGRAM(s) Oral every 12 hours    MEDICATIONS  (PRN):      ALLERGIES:  Allergies    No Known Allergies    Intolerances        OBJECTIVE:  ICU Vital Signs Last 24 Hrs  T(C): 36.8 (30 May 2020 05:00), Max: 37.3 (29 May 2020 16:00)  T(F): 98.2 (30 May 2020 05:00), Max: 99.2 (29 May 2020 16:00)  HR: 75 (30 May 2020 07:00) (64 - 103)  BP: 121/83 (30 May 2020 06:00) (115/85 - 133/89)  BP(mean): 93 (30 May 2020 06:00) (89 - 99)  ABP: --  ABP(mean): --  RR: 16 (30 May 2020 07:00) (13 - 24)  SpO2: 94% (30 May 2020 06:00) (93% - 98%)      Adult Advanced Hemodynamics Last 24 Hrs  CVP(mm Hg): --  CVP(cm H2O): --  CO: --  CI: --  PA: --  PA(mean): --  PCWP: --  SVR: --  SVRI: --  PVR: --  PVRI: --  CAPILLARY BLOOD GLUCOSE        CAPILLARY BLOOD GLUCOSE        I&O's Summary    29 May 2020 07:01  -  30 May 2020 07:00  --------------------------------------------------------  IN: 720 mL / OUT: 0 mL / NET: 720 mL      Daily     Daily     PHYSICAL EXAMINATION:  GENERAL: NAD  NEURO: A & O x 3, no focal neurologic deficits  HEENT: PERRL, EOMI  NECK: Supple, no JVD  PULM:  CTA B/L no crackles, rales or wheezes  CARD: RRR, +S1, +S2, No M/R/G  ABD: soft, ND, +BS, NTTP  EXT: warm, no pedal edema, good distal pulses  SKIN: No rashes, no hematoma at right groin site  LINES:  PIV    LABS:                          17.1   7.75  )-----------( 163      ( 30 May 2020 05:15 )             53.5     05-30    141  |  104  |  26<H>  ----------------------------<  102<H>  4.4   |  22  |  1.49<H>    Ca    9.1      30 May 2020 05:15  Phos  2.9     05-30  Mg     2.4     05-30    TPro  7.8  /  Alb  4.5  /  TBili  0.6  /  DBili  x   /  AST  40  /  ALT  25  /  AlkPhos  53  05-28    LIVER FUNCTIONS - ( 28 May 2020 12:18 )  Alb: 4.5 g/dL / Pro: 7.8 g/dL / ALK PHOS: 53 u/L / ALT: 25 u/L / AST: 40 u/L / GGT: x           PT/INR - ( 28 May 2020 12:18 )   PT: 10.3 SEC;   INR: 0.90          PTT - ( 28 May 2020 12:18 )  PTT:38.2 SEC  Creatine Kinase, Serum: 534 u/L (05-30 @ 05:15)  CKMB: 15.44 ng/mL (05-30 @ 05:15)  CKMB Relative Index: 2.9 (05-30 @ 05:15)  Creatine Kinase, Serum: 1113 u/L (05-29 @ 11:50)  CKMB: 72.22 ng/mL (05-29 @ 11:50)  CKMB Relative Index: 6.5 (05-29 @ 11:50)    CARDIAC MARKERS ( 30 May 2020 05:15 )  x     / x     / 534 u/L / 15.44 ng/mL / x      CARDIAC MARKERS ( 29 May 2020 11:50 )  x     / x     / 1113 u/L / 72.22 ng/mL / x      CARDIAC MARKERS ( 29 May 2020 04:00 )  x     / x     / 1681 u/L / 149.80 ng/mL / x      CARDIAC MARKERS ( 28 May 2020 12:18 )  x     / x     / 237 u/L / 8.94 ng/mL / x              TELEMETRY:   EKG:   IMAGING: Elke Perales PGY -1  Internal Medicine  Pager #: [phone] / [facility] [phone]    PATIENT: MELI ARREOLA | [id number]    CHIEF COMPLAINT: MELI ARREOLA is a 60y w/ PMHx of HTN, LALA, renal cell carcinoma with metastasis to lung s/p left nephrectomy and left upper lung lobectomy/wedge resections x 3, who is admitted to CCU for STEMI.    INTERVAL HPI / OVERNIGHT EVENTS:  -No acute overnight events.  -Patient reports doing well. Denies chest pain, SOB, palpitations. No further episodes of the unusual breathing that he felt the overnight. Denies lighthead/dizziness, fever/chills, abd pain, n/v/d, urinary complaints, extremity pain/swelling. Had BM yesterday.    MEDICATIONS:  MEDICATIONS  (STANDING):  aspirin enteric coated 81 milliGRAM(s) Oral daily  atorvastatin 80 milliGRAM(s) Oral at bedtime  chlorhexidine 4% Liquid 1 Application(s) Topical <User Schedule>  cholecalciferol 400 Unit(s) Oral daily  levothyroxine 175 MICROGram(s) Oral daily  metoprolol tartrate 12.5 milliGRAM(s) Oral two times a day  polyethylene glycol 3350 17 Gram(s) Oral daily  senna 2 Tablet(s) Oral at bedtime  ticagrelor 90 milliGRAM(s) Oral every 12 hours    MEDICATIONS  (PRN):      ALLERGIES:  Allergies    No Known Allergies    Intolerances        OBJECTIVE:  ICU Vital Signs Last 24 Hrs  T(C): 36.8 (30 May 2020 05:00), Max: 37.3 (29 May 2020 16:00)  T(F): 98.2 (30 May 2020 05:00), Max: 99.2 (29 May 2020 16:00)  HR: 75 (30 May 2020 07:00) (64 - 103)  BP: 121/83 (30 May 2020 06:00) (115/85 - 133/89)  BP(mean): 93 (30 May 2020 06:00) (89 - 99)  ABP: --  ABP(mean): --  RR: 16 (30 May 2020 07:00) (13 - 24)  SpO2: 94% (30 May 2020 06:00) (93% - 98%)      Adult Advanced Hemodynamics Last 24 Hrs  CVP(mm Hg): --  CVP(cm H2O): --  CO: --  CI: --  PA: --  PA(mean): --  PCWP: --  SVR: --  SVRI: --  PVR: --  PVRI: --  CAPILLARY BLOOD GLUCOSE        CAPILLARY BLOOD GLUCOSE        I&O's Summary    29 May 2020 07:01  -  30 May 2020 07:00  --------------------------------------------------------  IN: 720 mL / OUT: 0 mL / NET: 720 mL      Daily     Daily     PHYSICAL EXAMINATION:  GENERAL: NAD  NEURO: A & O x 3, no focal neurologic deficits  HEENT: PERRL, EOMI  NECK: Supple, no JVD  PULM:  CTA B/L no crackles, rales or wheezes  CARD: RRR, +S1, +S2, No M/R/G  ABD: soft, ND, +BS, NTTP  EXT: warm, no pedal edema, good distal pulses  SKIN: No rashes, no hematoma at right groin site  LINES:  PIV    LABS:                          17.1   7.75  )-----------( 163      ( 30 May 2020 05:15 )             53.5     05-30    141  |  104  |  26<H>  ----------------------------<  102<H>  4.4   |  22  |  1.49<H>    Ca    9.1      30 May 2020 05:15  Phos  2.9     05-30  Mg     2.4     05-30    TPro  7.8  /  Alb  4.5  /  TBili  0.6  /  DBili  x   /  AST  40  /  ALT  25  /  AlkPhos  53  05-28    LIVER FUNCTIONS - ( 28 May 2020 12:18 )  Alb: 4.5 g/dL / Pro: 7.8 g/dL / ALK PHOS: 53 u/L / ALT: 25 u/L / AST: 40 u/L / GGT: x           PT/INR - ( 28 May 2020 12:18 )   PT: 10.3 SEC;   INR: 0.90          PTT - ( 28 May 2020 12:18 )  PTT:38.2 SEC  Creatine Kinase, Serum: 534 u/L (05-30 @ 05:15)  CKMB: 15.44 ng/mL (05-30 @ 05:15)  CKMB Relative Index: 2.9 (05-30 @ 05:15)  Creatine Kinase, Serum: 1113 u/L (05-29 @ 11:50)  CKMB: 72.22 ng/mL (05-29 @ 11:50)  CKMB Relative Index: 6.5 (05-29 @ 11:50)    CARDIAC MARKERS ( 30 May 2020 05:15 )  x     / x     / 534 u/L / 15.44 ng/mL / x      CARDIAC MARKERS ( 29 May 2020 11:50 )  x     / x     / 1113 u/L / 72.22 ng/mL / x      CARDIAC MARKERS ( 29 May 2020 04:00 )  x     / x     / 1681 u/L / 149.80 ng/mL / x      CARDIAC MARKERS ( 28 May 2020 12:18 )  x     / x     / 237 u/L / 8.94 ng/mL / x        TELEMETRY: NSR  EKG: NSR  IMAGING:

## 2020-05-30 NOTE — DISCHARGE NOTE NURSING/CASE MANAGEMENT/SOCIAL WORK - PATIENT PORTAL LINK FT
You can access the FollowMyHealth Patient Portal offered by [facility] by registering at the following website: [email or web address]. By joining [facility]’s FollowMyHealth portal, you will also be able to view your health information using other applications (apps) compatible with our system.

## 2020-05-30 NOTE — PROGRESS NOTE ADULT - SUBJECTIVE AND OBJECTIVE BOX
CARDIOLOGY FOLLOW UP - Dr. Toussaint    CC no cp or sob        PHYSICAL EXAM:  T(C): 36.8 (05-30-20 @ 08:25), Max: 37.3 (05-29-20 @ 16:00)  HR: 78 (05-30-20 @ 09:00) (68 - 103)  BP: 115/82 (05-30-20 @ 08:25) (115/82 - 133/89)  RR: 19 (05-30-20 @ 09:00) (12 - 24)  SpO2: 96% (05-30-20 @ 08:25) (93% - 98%)  Wt(kg): --  I&O's Summary    29 May 2020 07:01  -  30 May 2020 07:00  --------------------------------------------------------  IN: 720 mL / OUT: 0 mL / NET: 720 mL    30 May 2020 07:01  -  30 May 2020 10:22  --------------------------------------------------------  IN: 240 mL / OUT: 0 mL / NET: 240 mL        Appearance: Normal	  Cardiovascular: Normal S1 S2,RRR, No JVD, No murmurs  Respiratory: Lungs clear to auscultation	  Gastrointestinal:  Soft, Non-tender, + BS	  Extremities: Normal range of motion, No clubbing, cyanosis or edema        MEDICATIONS  (STANDING):  aspirin enteric coated 81 milliGRAM(s) Oral daily  atorvastatin 80 milliGRAM(s) Oral at bedtime  chlorhexidine 4% Liquid 1 Application(s) Topical <User Schedule>  cholecalciferol 400 Unit(s) Oral daily  levothyroxine 175 MICROGram(s) Oral daily  metoprolol tartrate 12.5 milliGRAM(s) Oral two times a day  polyethylene glycol 3350 17 Gram(s) Oral daily  senna 2 Tablet(s) Oral at bedtime  ticagrelor 90 milliGRAM(s) Oral every 12 hours      TELEMETRY: NSR 1st degree avb	    ECG:  	  RADIOLOGY:   DIAGNOSTIC TESTING:  [ ] Echocardiogram:  < from: Transthoracic Echocardiogram (05.28.20 @ 14:39) >  Ejection Fraction (Teicholtz): 34 %  ------------------------------------------------------------------------  OBSERVATIONS:  Mitral Valve: Normal mitral valve. Mild mitral  regurgitation.  Aortic Root: Normal aortic root.  Aortic Valve: Normal trileaflet aortic valve. Mild aortic  regurgitation.  Left Atrium: Normal left atrium.  Left Ventricle: Endocardium not well visualized; grossly  moderate to severe segmental left ventricular systolic  function.  Hypokinesis of the inferior wall, inferolateral  wall, and inferoseptum. Normal left ventricular internal  dimensions and wall thicknesses.  Right Heart: Normal right atrium. Normal right ventricular  size with decreased right ventricular systolic function.  Normal tricuspid valve.  Minimal tricuspid regurgitation.  Normal pulmonic valve.  Pericardium/PleuraNormal pericardium with no pericardial  effusion.  ------------------------------------------------------------------------  CONCLUSIONS:  1. Normal mitral valve. Mild mitral regurgitation.  2. Normal trileaflet aortic valve. Mild aortic  regurgitation.  3. Normal left ventricular internal dimensions and wall  thicknesses.  4. Endocardium not well visualized; grossly moderate to  severe segmental left ventricular systolic function.  Hypokinesis of the inferior wall, inferolateral wall, and  inferoseptum.  5. Normal right ventricular size with decreased right  ventricular systolic function.  ------------------------------------------------------------------------  Confirmed on  5/28/2020 - 16:28:09 by Chetan Schulz M.D.  ------------------------------------------------------------------------    < end of copied text >    [ ]  Catheterization:  [ ] Stress Test:    OTHER: 	    LABS:	 	    Troponin T, High Sensitivity: 3144 ng/L [<6 - 14] (05-30 @ 05:15)  Creatine Kinase, Serum: 534 u/L [30 - 200] (05-30 @ 05:15)  CKMB: 15.44 ng/mL [1 - 6.6] (05-30 @ 05:15)  CKMB Relative Index: 2.9 [0.0 - 2.5] (05-30 @ 05:15)  Troponin T, High Sensitivity: 3524 ng/L [<6 - 14] (05-29 @ 11:50)  Creatine Kinase, Serum: 1113 u/L [30 - 200] (05-29 @ 11:50)  CKMB: 72.22 ng/mL [1 - 6.6] (05-29 @ 11:50)  CKMB Relative Index: 6.5 [0.0 - 2.5] (05-29 @ 11:50)  Troponin T, High Sensitivity: 5327 ng/L [<6 - 14] (05-29 @ 04:00)  Creatine Kinase, Serum: 1681 u/L [30 - 200] (05-29 @ 04:00)  CKMB: 149.80 ng/mL [1 - 6.6] (05-29 @ 04:00)  CKMB Relative Index: 8.9 [0.0 - 2.5] (05-29 @ 04:00)  Creatine Kinase, Serum: 237 u/L [30 - 200] (05-28 @ 12:18)  CKMB: 8.94 ng/mL [1 - 6.6] (05-28 @ 12:18)  CKMB Relative Index: 3.8 [0.0 - 2.5] (05-28 @ 12:18)  Troponin T, High Sensitivity: 44 ng/L [<6 - 14] (05-28 @ 12:18)                          17.1   7.75  )-----------( 163      ( 30 May 2020 05:15 )             53.5     05-30    141  |  104  |  26<H>  ----------------------------<  102<H>  4.4   |  22  |  1.49<H>    Ca    9.1      30 May 2020 05:15  Phos  2.9     05-30  Mg     2.4     05-30    TPro  7.8  /  Alb  4.5  /  TBili  0.6  /  DBili  x   /  AST  40  /  ALT  25  /  AlkPhos  53  05-28    PT/INR - ( 28 May 2020 12:18 )   PT: 10.3 SEC;   INR: 0.90          PTT - ( 28 May 2020 12:18 )  PTT:38.2 SEC

## 2020-05-30 NOTE — PROGRESS NOTE ADULT - ASSESSMENT
60 year old man with history of HTN, LALA, renal cell carcinoma with metastasis to lung s/p left nephrectomy and left upper lung lobectomy/wedge resection (?), admitted acute inferior wall STEMi    1. Inferior Stemi  -s/p HERNAN x 2 to distal RCA  -hd stable, resolved symptoms  -moderate LV dysfxn  -cont ASA, brilinta, statin   -trend cardiac enzymes  -TTE 5/28 with EF 34%, moderate to severe segmental LV systolic dysfunction; Hypokinesis of the inferior wall, inferolateral wall, and inferoseptum  -bb resumed   -sys bp stable, start low dose Lisinopril 2.5mg po daily     2. Ischemic Cardiomyopathy  -stable, no clinical hf, diuretic need  -continue bb   -TTE 5/28 with EF 34%, moderate to severe segmental LV systolic dysfunction; Hypokinesis of the inferior wall, inferolateral wall, and inferoseptum    3. CKD  -trend creat, improved today     4. Renal Cell CA  -stable, outpt onc f/u  -chemo on hold     dvt ppx 60 year old man with history of HTN, LALA, renal cell carcinoma with metastasis to lung s/p left nephrectomy and left upper lung lobectomy/wedge resection (?), admitted acute inferior wall STEMi    1. Inferior Stemi  -s/p HERNAN x 2 to distal RCA  -hd stable, resolved symptoms  -moderate LV dysfxn  -cont ASA, brilinta, statin   -trend cardiac enzymes  -TTE 5/28 with EF 34%, moderate to severe segmental LV systolic dysfunction; Hypokinesis of the inferior wall, inferolateral wall, and inferoseptum  -bb resumed   -sys bp stable, start low dose Lisinopril 2.5mg po daily     2. Ischemic Cardiomyopathy  -stable, no clinical hf, diuretic need  -continue bb   -TTE 5/28 with EF 34%, moderate to severe segmental LV systolic dysfunction; Hypokinesis of the inferior wall, inferolateral wall, and inferoseptum    3. CKD  -trend creat, improved today     4. Renal Cell CA  -stable, outpt onc f/u  -chemo on hold     dvt ppx  DCP planning today

## 2020-05-30 NOTE — PROGRESS NOTE ADULT - ATTENDING COMMENTS
Patient seen and examined.  Agree with above NP note.  cv stable post pci   no further angina  no dyspnea, clinical chf  echo reviewed, mod-sev segmental lv dysfxn  bb started  low dose ace jose luis if sbp can tolerate  cont asa, brilinta  creat stable  d/c plan for jose luis if stable   d/w ccu team
Patient seen and examined.  Agree with above NP note.  cv stable post pci   no further angina  no dyspnea, clinical chf  cont bb, arb for lv dysfxn  cont asa, brilinta  creat stable  d/c today    d/w ccu team

## 2020-05-30 NOTE — PROGRESS NOTE ADULT - ASSESSMENT
60M with PMH of HTN, LALA, renal cell carcinoma with metastasis to lung s/p left nephrectomy and left upper lung lobectomy/wedge resections x 3, presenting with STEMI. S/p cardiac cath on 5/28 with balloon angioplasty and stent to 100% distal RCA.    NEURO:  -No active issues    RESP:  #LALA  -Previously used CPAP at home. Has been off CPAP recently after weight loss, improvement in breathing.    #RCC with mets to lung s/p lobectomy/wedge resections x 3  -Comfortable on room air, saturating well  -Continue to monitor    CV:  #STEMI  #acute systolic heart failure, compensated  -Patient presenting with chest pain. EKG with ST elevations in leads II, III, aVF  -Loaded with ASA/Brilinta, heparin  -s/p LHC with balloon angioplasty and stent to 100% distal RCA  -TTE 5/28 with EF 34%, moderate to severe segmental LV systolic dysfunction; Hypokinesis of the inferior wall, inferolateral wall, and inferoseptum  -Appears euvolemic on exam  -Continue with ASA/Brilinta, Atorvastatin 80mg daily  -Resume metoprolol tartrate at lower dose 12.5mg BID, increase to home dose 25mg BID  -Start low-dose Lisinopril 2.5mg  -cardiac enzymes peaked and downtrending    #HTN  -home meds: Norvasc 10mg, Metoprolol 25mg BID  -hold home Norvasc. Resume home dose metoprolol tartrate 25mg BID    GI:  -Diet: DASH  -Bowel regimen: senna, miralax    RENAL/:  #Renal cell carcinoma s/p left nephrectomy  -on chemotherapy (Votrient 600mg daily) -- hold Votrient  -serum Cr 1.65 in 1/2018, per patient his baseline is 1.3-1.4  -serum Cr on admission 1.59, down to 1.4 this morning  -Follows-up with Dr. Amanda Rice ([facility]) for oncology    ENDO:  -HbA1C 6.0    #Hypothyroidism  -continue with home Levothyroxine 175mcg daily  -TSH wnl    ID:  -COVID negative x 1    HEME:  -H/H higher end of normal, in setting of known LALA. Asymptomatic. Continue to monitor 60M with PMH of HTN, LALA, renal cell carcinoma with metastasis to lung s/p left nephrectomy and left upper lung lobectomy/wedge resections x 3, presenting with STEMI. S/p cardiac cath on 5/28 with balloon angioplasty and stent to 100% distal RCA.    NEURO:  -No active issues    RESP:  #LALA  -Previously used CPAP at home. Has been off CPAP recently after weight loss, improvement in breathing.    #RCC with mets to lung s/p lobectomy/wedge resections x 3  -Comfortable on room air, saturating well  -Continue to monitor    CV:  #STEMI  #acute systolic heart failure, compensated  -Patient presenting with chest pain. EKG with ST elevations in leads II, III, aVF  -Loaded with ASA/Brilinta, heparin  -s/p LHC with balloon angioplasty and stent to 100% distal RCA  -TTE 5/28 with EF 34%, moderate to severe segmental LV systolic dysfunction; Hypokinesis of the inferior wall, inferolateral wall, and inferoseptum  -Appears euvolemic on exam  -Continue with ASA/Brilinta, Atorvastatin 80mg daily  -Continue with metoprolol tartrate at lower dose 12.5mg BID. Switch to Metoprolol succinate 25mg daily for discharge.  -Start Losartan 25 mg daily  -cardiac enzymes peaked and downtrending    #HTN  -home meds: Norvasc 10mg, Metoprolol 25mg BID  -hold home Norvasc. Metoprolol and Losartan as above.    GI:  -Diet: DASH  -Bowel regimen: senna, miralax    RENAL/:  #Renal cell carcinoma s/p left nephrectomy  -on chemotherapy (Votrient 600mg daily) -- hold Votrient  -serum Cr 1.65 in 1/2018, per patient his baseline is 1.3-1.4  -serum Cr on admission 1.59, down to 1.4 this morning  -Follows-up with Dr. Amanda Rice ([facility]) for oncology    ENDO:  -HbA1C 6.0    #Hypothyroidism  -continue with home Levothyroxine 175mcg daily  -TSH wnl    ID:  -COVID negative x 1    HEME:  -H/H higher end of normal, in setting of known LALA. Asymptomatic. Continue to monitor

## 2020-06-10 ENCOUNTER — EMERGENCY (EMERGENCY)
Facility: HOSPITAL | Age: 60
LOS: 1 days | Discharge: HOME | End: 2020-06-10
Attending: EMERGENCY MEDICINE | Admitting: EMERGENCY MEDICINE
Payer: COMMERCIAL

## 2020-06-10 VITALS
OXYGEN SATURATION: 98 % | HEART RATE: 93 BPM | TEMPERATURE: 99 F | DIASTOLIC BLOOD PRESSURE: 83 MMHG | RESPIRATION RATE: 16 BRPM | SYSTOLIC BLOOD PRESSURE: 137 MMHG

## 2020-06-10 DIAGNOSIS — Z90.2 ACQUIRED ABSENCE OF LUNG [PART OF]: Chronic | ICD-10-CM

## 2020-06-10 DIAGNOSIS — Z90.5 ACQUIRED ABSENCE OF KIDNEY: Chronic | ICD-10-CM

## 2020-06-10 LAB
ALBUMIN SERPL ELPH-MCNC: 3.6 G/DL — SIGNIFICANT CHANGE UP (ref 3.3–5)
ALP SERPL-CCNC: 48 U/L — SIGNIFICANT CHANGE UP (ref 40–120)
ALT FLD-CCNC: 11 U/L — SIGNIFICANT CHANGE UP (ref 4–41)
ANION GAP SERPL CALC-SCNC: 18 MMO/L — HIGH (ref 7–14)
AST SERPL-CCNC: 23 U/L — SIGNIFICANT CHANGE UP (ref 4–40)
BASE EXCESS BLDV CALC-SCNC: 0.7 MMOL/L — SIGNIFICANT CHANGE UP
BASOPHILS # BLD AUTO: 0.02 K/UL — SIGNIFICANT CHANGE UP (ref 0–0.2)
BASOPHILS NFR BLD AUTO: 0.2 % — SIGNIFICANT CHANGE UP (ref 0–2)
BILIRUB SERPL-MCNC: 0.6 MG/DL — SIGNIFICANT CHANGE UP (ref 0.2–1.2)
BLOOD GAS VENOUS - CREATININE: 1.44 MG/DL — HIGH (ref 0.5–1.3)
BLOOD GAS VENOUS - FIO2: 21 — SIGNIFICANT CHANGE UP
BODY FLUID TYPE: SIGNIFICANT CHANGE UP
BUN SERPL-MCNC: 24 MG/DL — HIGH (ref 7–23)
CALCIUM SERPL-MCNC: 9.4 MG/DL — SIGNIFICANT CHANGE UP (ref 8.4–10.5)
CHLORIDE BLDV-SCNC: 103 MMOL/L — SIGNIFICANT CHANGE UP (ref 96–108)
CHLORIDE SERPL-SCNC: 100 MMOL/L — SIGNIFICANT CHANGE UP (ref 98–107)
CK SERPL-CCNC: 99 U/L — SIGNIFICANT CHANGE UP (ref 30–200)
CLARITY SPEC: SIGNIFICANT CHANGE UP
CO2 SERPL-SCNC: 21 MMOL/L — LOW (ref 22–31)
COLOR FLD: YELLOW — SIGNIFICANT CHANGE UP
CREAT SERPL-MCNC: 1.35 MG/DL — HIGH (ref 0.5–1.3)
CRP SERPL-MCNC: 130.1 MG/L — HIGH
EOSINOPHIL # BLD AUTO: 0.09 K/UL — SIGNIFICANT CHANGE UP (ref 0–0.5)
EOSINOPHIL NFR BLD AUTO: 1.1 % — SIGNIFICANT CHANGE UP (ref 0–6)
ERYTHROCYTE [SEDIMENTATION RATE] IN BLOOD: 81 MM/HR — HIGH (ref 1–15)
GAS PNL BLDV: 138 MMOL/L — SIGNIFICANT CHANGE UP (ref 136–146)
GLUCOSE BLDV-MCNC: 115 MG/DL — HIGH (ref 70–99)
GLUCOSE SERPL-MCNC: 113 MG/DL — HIGH (ref 70–99)
HCO3 BLDV-SCNC: 25 MMOL/L — SIGNIFICANT CHANGE UP (ref 20–27)
HCT VFR BLD CALC: 42 % — SIGNIFICANT CHANGE UP (ref 39–50)
HCT VFR BLDV CALC: 43.3 % — SIGNIFICANT CHANGE UP (ref 39–51)
HGB BLD-MCNC: 13.6 G/DL — SIGNIFICANT CHANGE UP (ref 13–17)
HGB BLDV-MCNC: 14.1 G/DL — SIGNIFICANT CHANGE UP (ref 13–17)
IMM GRANULOCYTES NFR BLD AUTO: 2.6 % — HIGH (ref 0–1.5)
LACTATE BLDV-MCNC: 1 MMOL/L — SIGNIFICANT CHANGE UP (ref 0.5–2)
LYMPHOCYTES # BLD AUTO: 0.88 K/UL — LOW (ref 1–3.3)
LYMPHOCYTES # BLD AUTO: 10.9 % — LOW (ref 13–44)
MCHC RBC-ENTMCNC: 30.8 PG — SIGNIFICANT CHANGE UP (ref 27–34)
MCHC RBC-ENTMCNC: 32.4 % — SIGNIFICANT CHANGE UP (ref 32–36)
MCV RBC AUTO: 95.2 FL — SIGNIFICANT CHANGE UP (ref 80–100)
MONOCYTES # BLD AUTO: 0.85 K/UL — SIGNIFICANT CHANGE UP (ref 0–0.9)
MONOCYTES NFR BLD AUTO: 10.5 % — SIGNIFICANT CHANGE UP (ref 2–14)
NEUTROPHILS # BLD AUTO: 6.06 K/UL — SIGNIFICANT CHANGE UP (ref 1.8–7.4)
NEUTROPHILS NFR BLD AUTO: 74.7 % — SIGNIFICANT CHANGE UP (ref 43–77)
NRBC # FLD: 0 K/UL — SIGNIFICANT CHANGE UP (ref 0–0)
PCO2 BLDV: 40 MMHG — LOW (ref 41–51)
PH BLDV: 7.41 PH — SIGNIFICANT CHANGE UP (ref 7.32–7.43)
PLATELET # BLD AUTO: 215 K/UL — SIGNIFICANT CHANGE UP (ref 150–400)
PMV BLD: 8.1 FL — SIGNIFICANT CHANGE UP (ref 7–13)
PO2 BLDV: 47 MMHG — HIGH (ref 35–40)
POTASSIUM BLDV-SCNC: 4.4 MMOL/L — SIGNIFICANT CHANGE UP (ref 3.4–4.5)
POTASSIUM SERPL-MCNC: 4.9 MMOL/L — SIGNIFICANT CHANGE UP (ref 3.5–5.3)
POTASSIUM SERPL-SCNC: 4.9 MMOL/L — SIGNIFICANT CHANGE UP (ref 3.5–5.3)
PROT SERPL-MCNC: 7.8 G/DL — SIGNIFICANT CHANGE UP (ref 6–8.3)
RBC # BLD: 4.41 M/UL — SIGNIFICANT CHANGE UP (ref 4.2–5.8)
RBC # FLD: 15.9 % — HIGH (ref 10.3–14.5)
RCV VOL RI: HIGH CELL/UL (ref 0–5)
SAO2 % BLDV: 78.1 % — SIGNIFICANT CHANGE UP (ref 60–85)
SODIUM SERPL-SCNC: 139 MMOL/L — SIGNIFICANT CHANGE UP (ref 135–145)
TOTAL NUCLEATED CELL COUNT, BODY FLUID: HIGH CELL/UL (ref 0–5)
WBC # BLD: 8.11 K/UL — SIGNIFICANT CHANGE UP (ref 3.8–10.5)
WBC # FLD AUTO: 8.11 K/UL — SIGNIFICANT CHANGE UP (ref 3.8–10.5)

## 2020-06-10 PROCEDURE — 93971 EXTREMITY STUDY: CPT | Mod: 26

## 2020-06-10 PROCEDURE — 88108 CYTOPATH CONCENTRATE TECH: CPT | Mod: 26

## 2020-06-10 PROCEDURE — 73080 X-RAY EXAM OF ELBOW: CPT | Mod: 26,RT

## 2020-06-10 PROCEDURE — 99285 EMERGENCY DEPT VISIT HI MDM: CPT

## 2020-06-10 RX ORDER — KETOROLAC TROMETHAMINE 30 MG/ML
15 SYRINGE (ML) INJECTION ONCE
Refills: 0 | Status: DISCONTINUED | OUTPATIENT
Start: 2020-06-10 | End: 2020-06-10

## 2020-06-10 RX ADMIN — Medication 15 MILLIGRAM(S): at 17:51

## 2020-06-10 RX ADMIN — Medication 60 MILLIGRAM(S): at 22:00

## 2020-06-10 NOTE — ED ADULT NURSE NOTE - OBJECTIVE STATEMENT
Pt recently admitting for a heart attack with two stents placed. After discharge pt began experiencing right arm pain. Pt states he is unable to lift his arm. IV established, labs drawn as ordered. Will continue to monitor.

## 2020-06-10 NOTE — ED ADULT TRIAGE NOTE - CHIEF COMPLAINT QUOTE
A&OX3 c/o right arm pain x three days, + ROM in right hand, however pt states its painful to lift arm, reports numbness and tingling in finger tips,

## 2020-06-10 NOTE — ED PROVIDER NOTE - ATTENDING CONTRIBUTION TO CARE
Pt with swollen, erythematous tender R elbow, atrauamtic, difficulty with passive and active ROM, pt reports fever 2 days ago, concern for septic joint also with elevated inflammatory markers, ortho consulted.

## 2020-06-10 NOTE — ED ADULT NURSE REASSESSMENT NOTE - NS ED NURSE REASSESS COMMENT FT1
Pt laying comfortably in stretcher. Vitals as noted. Pt in no acute distress. Comfort measures provided. Medications given as per MD orders. Will continue to monitor.

## 2020-06-10 NOTE — ED PROVIDER NOTE - OBJECTIVE STATEMENT
60M with PMH of HTN, LALA, renal cell carcinoma with metastasis to lung s/p left nephrectomy and left upper lung lobectomy /wedge resections x 3, s/p  STEMI 2 weeks ago with stents x 2 - presents to ER c/o right arm pain and decreased rom. Pt. sates roughly 3-4 days ago noticed pain at elbow - though it was just arthritis/tendonitis but states over past 3-4 days pain  and swelling has gotten worse and and now cannot straighten elbow at all due to pain. also c/o weakness to arm below elbow with decreased  and wrist movement 0 but states not sure if weak or due to pain. Pt. went to PMD today and was sent to ER for further evlaution. Pt. does at admit 1.5 weeks ago while admitted had IV in his right arm that bruised heavily after removal. also c/o numbness to fingers as well. Denies fever chills redness cp nausea vomit.

## 2020-06-10 NOTE — ED PROVIDER NOTE - CLINICAL SUMMARY MEDICAL DECISION MAKING FREE TEXT BOX
59 y/o male c/o right arm pain  x 3 days  -unclear etiology, possible radicular vs msk r/o rhabdo, r/o DVT  -labs, nsaids  -xray, US UE  -reassess

## 2020-06-10 NOTE — ED PROVIDER NOTE - MUSCULOSKELETAL MINIMAL EXAM
right elbow: + mild swelling to antecubial/medial elbow, unable to range due to pain. + ttp diffusely. mild warmth on palpation. no erythema. no bruising.  decreased rom wrist/ strength as well. sensations intact.

## 2020-06-10 NOTE — ED PROVIDER NOTE - PROGRESS NOTE DETAILS
ROSHAN Polanco - patient seen by orthopedics - elbow aspiration performed and synovial fluid sent to the lab for cell count crystlas culture and gram stain. No abx recommendations at this time. ROSHAN Polanco - reassessed by ortho, cell count/crystals back - orthopedics recommending po prednisone currently and will hold off on antibiotics until gram stain results (roughly one hour from now as per lab). Will give prednisone and wait for gram stain. PA Medrano- Patient received at sign out pending Ortho reassessment. Ortho consulted appreciated, state given improvement in symptoms state patient can be dc'd and f/u in office. However Initial ED provider wished for patient to be monitored in CDU to obtain am labs and so ensure patient remains afebrile overnight and continues to have good ROM of Rt elbow. CDU PA aware and accepted. Pt agreeable to plan.

## 2020-06-11 VITALS
RESPIRATION RATE: 16 BRPM | HEART RATE: 61 BPM | TEMPERATURE: 97 F | OXYGEN SATURATION: 100 % | DIASTOLIC BLOOD PRESSURE: 87 MMHG | SYSTOLIC BLOOD PRESSURE: 130 MMHG

## 2020-06-11 LAB
ANION GAP SERPL CALC-SCNC: 13 MMO/L — SIGNIFICANT CHANGE UP (ref 7–14)
BASOPHILS # BLD AUTO: 0.03 K/UL — SIGNIFICANT CHANGE UP (ref 0–0.2)
BASOPHILS NFR BLD AUTO: 0.4 % — SIGNIFICANT CHANGE UP (ref 0–2)
BUN SERPL-MCNC: 31 MG/DL — HIGH (ref 7–23)
CALCIUM SERPL-MCNC: 9.4 MG/DL — SIGNIFICANT CHANGE UP (ref 8.4–10.5)
CHLORIDE SERPL-SCNC: 101 MMOL/L — SIGNIFICANT CHANGE UP (ref 98–107)
CO2 SERPL-SCNC: 24 MMOL/L — SIGNIFICANT CHANGE UP (ref 22–31)
CREAT SERPL-MCNC: 1.32 MG/DL — HIGH (ref 0.5–1.3)
CRP SERPL-MCNC: 105 MG/L — HIGH
EOSINOPHIL # BLD AUTO: 0 K/UL — SIGNIFICANT CHANGE UP (ref 0–0.5)
EOSINOPHIL NFR BLD AUTO: 0 % — SIGNIFICANT CHANGE UP (ref 0–6)
ERYTHROCYTE [SEDIMENTATION RATE] IN BLOOD: 81 MM/HR — HIGH (ref 1–15)
GLUCOSE SERPL-MCNC: 215 MG/DL — HIGH (ref 70–99)
GRAM STN FLD: SIGNIFICANT CHANGE UP
HBA1C BLD-MCNC: 5.8 % — HIGH (ref 4–5.6)
HCT VFR BLD CALC: 41.7 % — SIGNIFICANT CHANGE UP (ref 39–50)
HGB BLD-MCNC: 13.1 G/DL — SIGNIFICANT CHANGE UP (ref 13–17)
IMM GRANULOCYTES NFR BLD AUTO: 3.1 % — HIGH (ref 0–1.5)
LYMPHOCYTES # BLD AUTO: 0.42 K/UL — LOW (ref 1–3.3)
LYMPHOCYTES # BLD AUTO: 5.5 % — LOW (ref 13–44)
MCHC RBC-ENTMCNC: 30.8 PG — SIGNIFICANT CHANGE UP (ref 27–34)
MCHC RBC-ENTMCNC: 31.4 % — LOW (ref 32–36)
MCV RBC AUTO: 97.9 FL — SIGNIFICANT CHANGE UP (ref 80–100)
MONOCYTES # BLD AUTO: 0.18 K/UL — SIGNIFICANT CHANGE UP (ref 0–0.9)
MONOCYTES NFR BLD AUTO: 2.3 % — SIGNIFICANT CHANGE UP (ref 2–14)
NEUTROPHILS # BLD AUTO: 6.79 K/UL — SIGNIFICANT CHANGE UP (ref 1.8–7.4)
NEUTROPHILS NFR BLD AUTO: 88.7 % — HIGH (ref 43–77)
NRBC # FLD: 0 K/UL — SIGNIFICANT CHANGE UP (ref 0–0)
PLATELET # BLD AUTO: 215 K/UL — SIGNIFICANT CHANGE UP (ref 150–400)
PMV BLD: 8.4 FL — SIGNIFICANT CHANGE UP (ref 7–13)
POTASSIUM SERPL-MCNC: 5 MMOL/L — SIGNIFICANT CHANGE UP (ref 3.5–5.3)
POTASSIUM SERPL-SCNC: 5 MMOL/L — SIGNIFICANT CHANGE UP (ref 3.5–5.3)
RBC # BLD: 4.26 M/UL — SIGNIFICANT CHANGE UP (ref 4.2–5.8)
RBC # FLD: 15.5 % — HIGH (ref 10.3–14.5)
SARS-COV-2 RNA SPEC QL NAA+PROBE: SIGNIFICANT CHANGE UP
SODIUM SERPL-SCNC: 138 MMOL/L — SIGNIFICANT CHANGE UP (ref 135–145)
SPECIMEN SOURCE: SIGNIFICANT CHANGE UP
WBC # BLD: 7.66 K/UL — SIGNIFICANT CHANGE UP (ref 3.8–10.5)
WBC # FLD AUTO: 7.66 K/UL — SIGNIFICANT CHANGE UP (ref 3.8–10.5)

## 2020-06-11 PROCEDURE — 99234 HOSP IP/OBS SM DT SF/LOW 45: CPT

## 2020-06-11 RX ORDER — LEVOTHYROXINE SODIUM 125 MCG
175 TABLET ORAL DAILY
Refills: 0 | Status: DISCONTINUED | OUTPATIENT
Start: 2020-06-11 | End: 2020-06-14

## 2020-06-11 RX ORDER — LOSARTAN POTASSIUM 100 MG/1
25 TABLET, FILM COATED ORAL DAILY
Refills: 0 | Status: DISCONTINUED | OUTPATIENT
Start: 2020-06-11 | End: 2020-06-14

## 2020-06-11 RX ORDER — METOPROLOL TARTRATE 50 MG
25 TABLET ORAL DAILY
Refills: 0 | Status: DISCONTINUED | OUTPATIENT
Start: 2020-06-11 | End: 2020-06-14

## 2020-06-11 RX ORDER — ATORVASTATIN CALCIUM 80 MG/1
80 TABLET, FILM COATED ORAL AT BEDTIME
Refills: 0 | Status: DISCONTINUED | OUTPATIENT
Start: 2020-06-11 | End: 2020-06-14

## 2020-06-11 RX ORDER — KETOROLAC TROMETHAMINE 30 MG/ML
15 SYRINGE (ML) INJECTION EVERY 8 HOURS
Refills: 0 | Status: DISCONTINUED | OUTPATIENT
Start: 2020-06-11 | End: 2020-06-11

## 2020-06-11 RX ORDER — ASPIRIN/CALCIUM CARB/MAGNESIUM 324 MG
81 TABLET ORAL DAILY
Refills: 0 | Status: DISCONTINUED | OUTPATIENT
Start: 2020-06-11 | End: 2020-06-14

## 2020-06-11 RX ORDER — TICAGRELOR 90 MG/1
90 TABLET ORAL
Refills: 0 | Status: DISCONTINUED | OUTPATIENT
Start: 2020-06-11 | End: 2020-06-14

## 2020-06-11 RX ADMIN — LOSARTAN POTASSIUM 25 MILLIGRAM(S): 100 TABLET, FILM COATED ORAL at 05:21

## 2020-06-11 RX ADMIN — Medication 81 MILLIGRAM(S): at 05:23

## 2020-06-11 RX ADMIN — Medication 175 MICROGRAM(S): at 05:21

## 2020-06-11 RX ADMIN — Medication 60 MILLIGRAM(S): at 08:52

## 2020-06-11 RX ADMIN — Medication 25 MILLIGRAM(S): at 05:21

## 2020-06-11 RX ADMIN — TICAGRELOR 90 MILLIGRAM(S): 90 TABLET ORAL at 05:22

## 2020-06-11 NOTE — PROCEDURE NOTE - ADDITIONAL PROCEDURE DETAILS
R elbow  Procedure Note:    After verbal consent obtained, pt's skin was cleaned w/ alcohol swab. Elbow was then sterilely prepped w/ Betadine. An 18 gauge needle on a 60cc syringe was then introduced into the superolateral capsule and around 5cc of turbid appearing fluid was aspirated. Joint fluid was then sent for cell count, crystals, gram stain and culture. The elbow joint was then cleaned and a sterile dressing was placed over the aspiration site and the elbow was wrapped in an ACE wrap. The pt tolerated the procedure well and the pt was NVI post procedure.

## 2020-06-11 NOTE — ED CDU PROVIDER INITIAL DAY NOTE - MEDICAL DECISION MAKING DETAILS
CDU for observation, reassessment and repeat labs CDU for observation, reassessment and repeat labs. Will hold antibiotics for now, as pt is afebrile, no leukocytosis and + crystals in synovial fluid more consistent with inflammatory arthritis. CDU for observation, reassessment and repeat labs. Will hold antibiotics for now, as pt is afebrile, no leukocytosis and + crystals in synovial fluid more consistent with inflammatory arthritis. Symptoms have vastly improved since NSAIDs, steroids and arthrocentesis.

## 2020-06-11 NOTE — ED CDU PROVIDER INITIAL DAY NOTE - ATTENDING CONTRIBUTION TO CARE
CDU MD MOJICA:  I performed a face to face bedside interview with patient regarding history of present illness, review of symptoms and past medical history. I completed an independent physical exam.  I have discussed patient's plan of care with PA.   I agree with note as stated above, having amended the EMR as needed to reflect my findings. I have discussed the assessment and plan of care.  This includes during the time I functioned as the attending physician for this patient.

## 2020-06-11 NOTE — ED CDU PROVIDER DISPOSITION NOTE - NSFOLLOWUPCLINICS_GEN_ALL_ED_FT
A Rheumatologist  Rheumatology  .  [place]   Phone:   Fax:   Follow Up Time:     [facility]  Rheumatology  [address]  [place] [address]  Phone: [phone]  Fax:   Follow Up Time:     [facility]  Rheumatology  [address] [place] [address]  Phone: [phone]  Fax: [phone]  Follow Up Time:

## 2020-06-11 NOTE — ED CDU PROVIDER INITIAL DAY NOTE - PROGRESS NOTE DETAILS
Pt reassessed, feeling better this morning. Improved ROM of R elbow. Seen by ortho again, cleared for discharge. Pain and swelling improved.  Aspiration by ortho yesterday improved sx greatly.  No fever overnight.  Will dc home.

## 2020-06-11 NOTE — CONSULT NOTE ADULT - SUBJECTIVE AND OBJECTIVE BOX
60yMale presents to [facility] ED c/o R elbow pain for 3 days. Pt was unable to range elbow and noticed swelling and erythema. He had subjective fever. Pt denies radiation of pain. Pt denies numbness, tingling, or burning. Pt denies hx of trauma.  Patient has renal cell carcinoma with nephrectomy.     PMH:  HTN (hypertension)  LALA (obstructive sleep apnea)  Snoring  Obesity  Diverticulitis  Incisional hernia  Incisional hernia    PSH:  H/O unilateral nephrectomy  History of lobectomy of lung  Incisional hernia  Diverticulitis    AH:    Meds: See med rec    T(C): 36.5 (06-11-20 @ 01:37)  HR: 65 (06-11-20 @ 01:37)  BP: 138/89 (06-11-20 @ 01:37)  RR: 18 (06-11-20 @ 01:37)  SpO2: 96% (06-11-20 @ 01:37)  Wt(kg): --    RUE:  Skin intact, + soft tissue swelling, erythema; + TTP; No pain with micro-motion.   Distal pulses intact, SILT, 5/5, soft compartments       Imaging:  XR of R elbow demonstrates no acute fracture or dislocation     Procedure Note:    After verbal consent obtained, pt's skin was cleaned w/ alcohol swab. Elbow was then sterilely prepped w/ Betadine. An 18 gauge needle on a 60cc syringe was then introduced into the superolateral capsule and around 5cc of turbid appearing fluid was aspirated. Joint fluid was then sent for cell count, crystals, gram stain and culture. The elbow joint was then cleaned and a sterile dressing was placed over the aspiration site and the elbow was wrapped in an ACE wrap. The pt tolerated the procedure well and the pt was NVI post procedure.     A/P:     60yMale w/ R elbow effusion. Patient's aspirate was consistent with acute gouty arthritis. Patient's exam improved after the aspiration with no pain with micromotion and ability to range elbow from 70 to 110 degrees. Patient was then given 60mg of prednisone with even greater improvement in pain and was nearly able to fully extend the elbow.    - Pain control  - WBAT RUE  - Rheumatology consult   - FU culture  - d/w attending, agrees w/ plan    Hugh Sevilla MD 60yMale presents [facility] ED c/o R elbow pain for 3 days. Pt was unable to range elbow and noticed swelling and erythema. He had subjective fever. Pt denies radiation of pain. Pt denies numbness, tingling, or burning. Pt denies hx of trauma.  Patient has renal cell carcinoma with nephrectomy.     PMH:  HTN (hypertension)  LALA (obstructive sleep apnea)  Snoring  Obesity  Diverticulitis  Incisional hernia  Incisional hernia    PSH:  H/O unilateral nephrectomy  History of lobectomy of lung  Incisional hernia  Diverticulitis    AH:    Meds: See med rec    T(C): 36.5 (06-11-20 @ 01:37)  HR: 65 (06-11-20 @ 01:37)  BP: 138/89 (06-11-20 @ 01:37)  RR: 18 (06-11-20 @ 01:37)  SpO2: 96% (06-11-20 @ 01:37)  Wt(kg): --    RUE:  Skin intact, + soft tissue swelling, erythema; + TTP; No pain with micro-motion.   Distal pulses intact, SILT, 5/5, soft compartments       Imaging:  XR of R elbow demonstrates no acute fracture or dislocation     Procedure Note:    After verbal consent obtained, pt's skin was cleaned w/ alcohol swab. Elbow was then sterilely prepped w/ Betadine. An 18 gauge needle on a 60cc syringe was then introduced into the superolateral capsule and around 5cc of turbid appearing fluid was aspirated. Joint fluid was then sent for cell count, crystals, gram stain and culture. The elbow joint was then cleaned and a sterile dressing was placed over the aspiration site and the elbow was wrapped in an ACE wrap. The pt tolerated the procedure well and the pt was NVI post procedure.     A/P:     60yMale w/ R elbow effusion. Patient's aspirate was consistent with acute gouty arthritis. Patient's exam improved after the aspiration with no pain with micromotion and ability to range elbow from 70 to 110 degrees. Patient was then given 60mg of prednisone with even greater improvement in pain and was nearly able to fully extend the elbow.    - Pain control  - WBAT RUE  - Recommend rheumatology followup or consult   - FU culture    Hugh Sevilla MD

## 2020-06-11 NOTE — ED CDU PROVIDER DISPOSITION NOTE - PATIENT PORTAL LINK FT
You can access the FollowMyHealth Patient Portal offered by [facility] by registering at the following website: [email or web address]. By joining [employer]’s FollowMyHealth portal, you will also be able to view your health information using other applications (apps) compatible with our system.

## 2020-06-11 NOTE — ED CDU PROVIDER INITIAL DAY NOTE - OBJECTIVE STATEMENT
60M with PMH of HTN, LALA, renal cell carcinoma with metastasis to lung s/p left nephrectomy and left upper lung lobectomy /wedge resections x 3, s/p  STEMI 2 weeks ago with stents x 2 - presents to ER c/o right arm pain and decreased rom. Pt. sates roughly 3-4 days ago noticed pain at elbow - though it was just arthritis/tendonitis but states over past 3-4 days pain  and swelling has gotten worse and and now cannot straighten elbow at all due to pain. also c/o weakness to arm below elbow with decreased  and wrist movement 0 but states not sure if weak or due to pain. Pt. went to PMD today and was sent to ER for further evlaution. Pt. does at admit 1.5 weeks ago while admitted had IV in his right arm that bruised heavily after removal. also c/o numbness to fingers as well. Denies fever chills redness cp nausea vomit.    CDU: agree with above. Pt with R elbow swelling and pain with decreased ROM. Arthrocentesis performed by ortho with extraction of synovial fluid 2/2 concern for septic arthritis vs inflammatory arthritis. Analysis reveals yellow turbid fluid, Total cell count 64,681, RBC count 13,000, + Crystals. Pt received prednisone and toradol in ED. After arthrocentesis and medication pt's pain and ROM improved. Given improvement on exam, Ortho cleared pt to be discharged per resident. However, ED team advised pt to stay overnight for observation, reassessment and repeat labs in the morning.

## 2020-06-11 NOTE — ED CDU PROVIDER DISPOSITION NOTE - CLINICAL COURSE
60M with PMH of HTN, LALA, renal cell carcinoma with metastasis to lung s/p left nephrectomy and left upper lung lobectomy /wedge resections x 3, s/p  STEMI 2 weeks ago with stents x 2 presented to the ER w/ R elbow swelling and decreased ROM, sp arthrocentesis by ortho consistent w/ gout. Swelling significantly improved, pain diminished, ROM increased sp po steroids. Pt is feeling better and ready for discharge w. outpatient rheum followup.

## 2020-06-11 NOTE — ED CDU PROVIDER DISPOSITION NOTE - NSFOLLOWUPINSTRUCTIONS_ED_ALL_ED_FT
See your primary care doctor within 24-48 hours, bring copies of all reports with you. Take Prednisone 2 tablets, once a day for 3 more days, which totals to a 5 day course. Keep the ace wrap on the elbow during the day to decrease swelling. Return to the ER for worsening symptoms, fever, or any other concerns.

## 2020-06-23 LAB
CULTURE RESULTS: SIGNIFICANT CHANGE UP
SPECIMEN SOURCE: SIGNIFICANT CHANGE UP

## 2020-10-28 NOTE — ED PROVIDER NOTE - INPATIENT RESIDENT/ACP NOTIFIED DATE
You can access the FollowMyHealth Patient Portal offered by [facility] by registering at the following website: [email or web address]. By joining [employer]’s FollowMyHealth portal, you will also be able to view your health information using other applications (apps) compatible with our system. 11-Jun-2020 01:16

## 2020-12-09 ENCOUNTER — INPATIENT (INPATIENT)
Facility: HOSPITAL | Age: 60
LOS: 2 days | Discharge: HOME | End: 2020-12-12
Attending: INTERNAL MEDICINE | Admitting: INTERNAL MEDICINE
Payer: COMMERCIAL

## 2020-12-09 VITALS
RESPIRATION RATE: 18 BRPM | HEART RATE: 102 BPM | HEIGHT: 66 IN | DIASTOLIC BLOOD PRESSURE: 90 MMHG | TEMPERATURE: 98 F | OXYGEN SATURATION: 100 % | SYSTOLIC BLOOD PRESSURE: 133 MMHG

## 2020-12-09 DIAGNOSIS — R50.9 FEVER, UNSPECIFIED: ICD-10-CM

## 2020-12-09 DIAGNOSIS — R07.9 CHEST PAIN, UNSPECIFIED: ICD-10-CM

## 2020-12-09 DIAGNOSIS — Z90.2 ACQUIRED ABSENCE OF LUNG [PART OF]: Chronic | ICD-10-CM

## 2020-12-09 DIAGNOSIS — M54.9 DORSALGIA, UNSPECIFIED: ICD-10-CM

## 2020-12-09 DIAGNOSIS — I10 ESSENTIAL (PRIMARY) HYPERTENSION: ICD-10-CM

## 2020-12-09 DIAGNOSIS — N17.9 ACUTE KIDNEY FAILURE, UNSPECIFIED: ICD-10-CM

## 2020-12-09 DIAGNOSIS — I25.10 ATHEROSCLEROTIC HEART DISEASE OF NATIVE CORONARY ARTERY WITHOUT ANGINA PECTORIS: ICD-10-CM

## 2020-12-09 DIAGNOSIS — R55 SYNCOPE AND COLLAPSE: ICD-10-CM

## 2020-12-09 DIAGNOSIS — G47.33 OBSTRUCTIVE SLEEP APNEA (ADULT) (PEDIATRIC): ICD-10-CM

## 2020-12-09 DIAGNOSIS — U07.1 COVID-19: ICD-10-CM

## 2020-12-09 DIAGNOSIS — Z90.5 ACQUIRED ABSENCE OF KIDNEY: Chronic | ICD-10-CM

## 2020-12-09 LAB
ALBUMIN SERPL ELPH-MCNC: 3.7 G/DL — SIGNIFICANT CHANGE UP (ref 3.3–5)
ALP SERPL-CCNC: 96 U/L — SIGNIFICANT CHANGE UP (ref 40–120)
ALT FLD-CCNC: 41 U/L — SIGNIFICANT CHANGE UP (ref 4–41)
ANION GAP SERPL CALC-SCNC: 14 MMOL/L — SIGNIFICANT CHANGE UP (ref 7–14)
APTT BLD: 30.2 SEC — SIGNIFICANT CHANGE UP (ref 27–36.3)
AST SERPL-CCNC: 44 U/L — HIGH (ref 4–40)
B PERT DNA SPEC QL NAA+PROBE: SIGNIFICANT CHANGE UP
BASOPHILS # BLD AUTO: 0.01 K/UL — SIGNIFICANT CHANGE UP (ref 0–0.2)
BASOPHILS NFR BLD AUTO: 0.2 % — SIGNIFICANT CHANGE UP (ref 0–2)
BILIRUB SERPL-MCNC: 1.1 MG/DL — SIGNIFICANT CHANGE UP (ref 0.2–1.2)
BLOOD GAS VENOUS COMPREHENSIVE RESULT: SIGNIFICANT CHANGE UP
BLOOD GAS VENOUS COMPREHENSIVE RESULT: SIGNIFICANT CHANGE UP
BUN SERPL-MCNC: 27 MG/DL — HIGH (ref 7–23)
C PNEUM DNA SPEC QL NAA+PROBE: SIGNIFICANT CHANGE UP
CALCIUM SERPL-MCNC: 9.2 MG/DL — SIGNIFICANT CHANGE UP (ref 8.4–10.5)
CHLORIDE SERPL-SCNC: 98 MMOL/L — SIGNIFICANT CHANGE UP (ref 98–107)
CO2 SERPL-SCNC: 23 MMOL/L — SIGNIFICANT CHANGE UP (ref 22–31)
CREAT SERPL-MCNC: 2.01 MG/DL — HIGH (ref 0.5–1.3)
EOSINOPHIL # BLD AUTO: 0 K/UL — SIGNIFICANT CHANGE UP (ref 0–0.5)
EOSINOPHIL NFR BLD AUTO: 0 % — SIGNIFICANT CHANGE UP (ref 0–6)
FLUAV H1 2009 PAND RNA SPEC QL NAA+PROBE: SIGNIFICANT CHANGE UP
FLUAV H1 RNA SPEC QL NAA+PROBE: SIGNIFICANT CHANGE UP
FLUAV H3 RNA SPEC QL NAA+PROBE: SIGNIFICANT CHANGE UP
FLUAV SUBTYP SPEC NAA+PROBE: SIGNIFICANT CHANGE UP
FLUBV RNA SPEC QL NAA+PROBE: SIGNIFICANT CHANGE UP
GLUCOSE SERPL-MCNC: 143 MG/DL — HIGH (ref 70–99)
HADV DNA SPEC QL NAA+PROBE: SIGNIFICANT CHANGE UP
HCOV PNL SPEC NAA+PROBE: SIGNIFICANT CHANGE UP
HCT VFR BLD CALC: 53.8 % — HIGH (ref 39–50)
HGB BLD-MCNC: 17.4 G/DL — HIGH (ref 13–17)
HMPV RNA SPEC QL NAA+PROBE: SIGNIFICANT CHANGE UP
HPIV1 RNA SPEC QL NAA+PROBE: SIGNIFICANT CHANGE UP
HPIV2 RNA SPEC QL NAA+PROBE: SIGNIFICANT CHANGE UP
HPIV3 RNA SPEC QL NAA+PROBE: SIGNIFICANT CHANGE UP
HPIV4 RNA SPEC QL NAA+PROBE: SIGNIFICANT CHANGE UP
IANC: 4.09 K/UL — SIGNIFICANT CHANGE UP (ref 1.5–8.5)
IMM GRANULOCYTES NFR BLD AUTO: 0.5 % — SIGNIFICANT CHANGE UP (ref 0–1.5)
INR BLD: 1.12 RATIO — SIGNIFICANT CHANGE UP (ref 0.88–1.17)
LIDOCAIN IGE QN: 25 U/L — SIGNIFICANT CHANGE UP (ref 7–60)
LYMPHOCYTES # BLD AUTO: 0.57 K/UL — LOW (ref 1–3.3)
LYMPHOCYTES # BLD AUTO: 10.4 % — LOW (ref 13–44)
MCHC RBC-ENTMCNC: 27.4 PG — SIGNIFICANT CHANGE UP (ref 27–34)
MCHC RBC-ENTMCNC: 32.3 GM/DL — SIGNIFICANT CHANGE UP (ref 32–36)
MCV RBC AUTO: 84.7 FL — SIGNIFICANT CHANGE UP (ref 80–100)
MONOCYTES # BLD AUTO: 0.76 K/UL — SIGNIFICANT CHANGE UP (ref 0–0.9)
MONOCYTES NFR BLD AUTO: 13.9 % — SIGNIFICANT CHANGE UP (ref 2–14)
NEUTROPHILS # BLD AUTO: 4.09 K/UL — SIGNIFICANT CHANGE UP (ref 1.8–7.4)
NEUTROPHILS NFR BLD AUTO: 75 % — SIGNIFICANT CHANGE UP (ref 43–77)
NRBC # BLD: 0 /100 WBCS — SIGNIFICANT CHANGE UP
NRBC # FLD: 0 K/UL — SIGNIFICANT CHANGE UP
OB PNL STL: NEGATIVE — SIGNIFICANT CHANGE UP
PLATELET # BLD AUTO: 117 K/UL — LOW (ref 150–400)
POTASSIUM SERPL-MCNC: 4.7 MMOL/L — SIGNIFICANT CHANGE UP (ref 3.5–5.3)
POTASSIUM SERPL-SCNC: 4.7 MMOL/L — SIGNIFICANT CHANGE UP (ref 3.5–5.3)
PROT SERPL-MCNC: 7.8 G/DL — SIGNIFICANT CHANGE UP (ref 6–8.3)
PROTHROM AB SERPL-ACNC: 12.8 SEC — SIGNIFICANT CHANGE UP (ref 9.8–13.1)
RAPID RVP RESULT: DETECTED
RBC # BLD: 6.35 M/UL — HIGH (ref 4.2–5.8)
RBC # FLD: 16.6 % — HIGH (ref 10.3–14.5)
RV+EV RNA SPEC QL NAA+PROBE: SIGNIFICANT CHANGE UP
SARS-COV-2 RNA SPEC QL NAA+PROBE: DETECTED
SODIUM SERPL-SCNC: 135 MMOL/L — SIGNIFICANT CHANGE UP (ref 135–145)
TROPONIN T, HIGH SENSITIVITY RESULT: 25 NG/L — SIGNIFICANT CHANGE UP
TROPONIN T, HIGH SENSITIVITY RESULT: 27 NG/L — SIGNIFICANT CHANGE UP
WBC # BLD: 5.46 K/UL — SIGNIFICANT CHANGE UP (ref 3.8–10.5)
WBC # FLD AUTO: 5.46 K/UL — SIGNIFICANT CHANGE UP (ref 3.8–10.5)

## 2020-12-09 PROCEDURE — 99284 EMERGENCY DEPT VISIT MOD MDM: CPT

## 2020-12-09 PROCEDURE — 71045 X-RAY EXAM CHEST 1 VIEW: CPT | Mod: 26

## 2020-12-09 PROCEDURE — 99253 IP/OBS CNSLTJ NEW/EST LOW 45: CPT

## 2020-12-09 PROCEDURE — 72128 CT CHEST SPINE W/O DYE: CPT | Mod: 26

## 2020-12-09 PROCEDURE — 70450 CT HEAD/BRAIN W/O DYE: CPT | Mod: 26

## 2020-12-09 PROCEDURE — 71250 CT THORAX DX C-: CPT | Mod: 26

## 2020-12-09 PROCEDURE — 72131 CT LUMBAR SPINE W/O DYE: CPT | Mod: 26

## 2020-12-09 RX ORDER — TICAGRELOR 90 MG/1
1 TABLET ORAL
Qty: 0 | Refills: 0 | DISCHARGE

## 2020-12-09 RX ORDER — COLCHICINE 0.6 MG
1 TABLET ORAL
Qty: 0 | Refills: 0 | DISCHARGE

## 2020-12-09 RX ORDER — HEPARIN SODIUM 5000 [USP'U]/ML
5000 INJECTION INTRAVENOUS; SUBCUTANEOUS EVERY 12 HOURS
Refills: 0 | Status: DISCONTINUED | OUTPATIENT
Start: 2020-12-09 | End: 2020-12-12

## 2020-12-09 RX ORDER — ATORVASTATIN CALCIUM 80 MG/1
80 TABLET, FILM COATED ORAL AT BEDTIME
Refills: 0 | Status: DISCONTINUED | OUTPATIENT
Start: 2020-12-09 | End: 2020-12-12

## 2020-12-09 RX ORDER — LEVOTHYROXINE SODIUM 125 MCG
1 TABLET ORAL
Qty: 0 | Refills: 0 | DISCHARGE

## 2020-12-09 RX ORDER — ASPIRIN/CALCIUM CARB/MAGNESIUM 324 MG
81 TABLET ORAL DAILY
Refills: 0 | Status: DISCONTINUED | OUTPATIENT
Start: 2020-12-09 | End: 2020-12-12

## 2020-12-09 RX ORDER — ALLOPURINOL 300 MG
100 TABLET ORAL DAILY
Refills: 0 | Status: DISCONTINUED | OUTPATIENT
Start: 2020-12-09 | End: 2020-12-12

## 2020-12-09 RX ORDER — MORPHINE SULFATE 50 MG/1
4 CAPSULE, EXTENDED RELEASE ORAL ONCE
Refills: 0 | Status: DISCONTINUED | OUTPATIENT
Start: 2020-12-09 | End: 2020-12-09

## 2020-12-09 RX ORDER — METOPROLOL TARTRATE 50 MG
1 TABLET ORAL
Qty: 0 | Refills: 0 | DISCHARGE

## 2020-12-09 RX ORDER — COLCHICINE 0.6 MG
0.6 TABLET ORAL DAILY
Refills: 0 | Status: DISCONTINUED | OUTPATIENT
Start: 2020-12-09 | End: 2020-12-12

## 2020-12-09 RX ORDER — ACETAMINOPHEN 500 MG
650 TABLET ORAL ONCE
Refills: 0 | Status: COMPLETED | OUTPATIENT
Start: 2020-12-09 | End: 2020-12-09

## 2020-12-09 RX ORDER — SODIUM CHLORIDE 9 MG/ML
1000 INJECTION INTRAMUSCULAR; INTRAVENOUS; SUBCUTANEOUS ONCE
Refills: 0 | Status: COMPLETED | OUTPATIENT
Start: 2020-12-09 | End: 2020-12-09

## 2020-12-09 RX ORDER — DIAZEPAM 5 MG
5 TABLET ORAL EVERY 8 HOURS
Refills: 0 | Status: DISCONTINUED | OUTPATIENT
Start: 2020-12-09 | End: 2020-12-12

## 2020-12-09 RX ORDER — LOSARTAN POTASSIUM 100 MG/1
25 TABLET, FILM COATED ORAL DAILY
Refills: 0 | Status: DISCONTINUED | OUTPATIENT
Start: 2020-12-09 | End: 2020-12-09

## 2020-12-09 RX ORDER — FEXOFENADINE HCL 30 MG
1 TABLET ORAL
Qty: 0 | Refills: 0 | DISCHARGE

## 2020-12-09 RX ORDER — HYDROMORPHONE HYDROCHLORIDE 2 MG/ML
1 INJECTION INTRAMUSCULAR; INTRAVENOUS; SUBCUTANEOUS EVERY 4 HOURS
Refills: 0 | Status: DISCONTINUED | OUTPATIENT
Start: 2020-12-09 | End: 2020-12-12

## 2020-12-09 RX ORDER — AMLODIPINE BESYLATE 2.5 MG/1
10 TABLET ORAL DAILY
Refills: 0 | Status: DISCONTINUED | OUTPATIENT
Start: 2020-12-09 | End: 2020-12-12

## 2020-12-09 RX ORDER — PANTOPRAZOLE SODIUM 20 MG/1
40 TABLET, DELAYED RELEASE ORAL ONCE
Refills: 0 | Status: COMPLETED | OUTPATIENT
Start: 2020-12-09 | End: 2020-12-09

## 2020-12-09 RX ORDER — TICAGRELOR 90 MG/1
90 TABLET ORAL EVERY 12 HOURS
Refills: 0 | Status: DISCONTINUED | OUTPATIENT
Start: 2020-12-09 | End: 2020-12-12

## 2020-12-09 RX ORDER — ONDANSETRON 8 MG/1
4 TABLET, FILM COATED ORAL ONCE
Refills: 0 | Status: COMPLETED | OUTPATIENT
Start: 2020-12-09 | End: 2020-12-09

## 2020-12-09 RX ORDER — ASPIRIN/CALCIUM CARB/MAGNESIUM 324 MG
1 TABLET ORAL
Qty: 0 | Refills: 0 | DISCHARGE

## 2020-12-09 RX ORDER — ALBUTEROL 90 UG/1
2 AEROSOL, METERED ORAL
Qty: 0 | Refills: 0 | DISCHARGE

## 2020-12-09 RX ORDER — CHOLECALCIFEROL (VITAMIN D3) 125 MCG
1 CAPSULE ORAL
Qty: 0 | Refills: 0 | DISCHARGE

## 2020-12-09 RX ORDER — FLUTICASONE PROPIONATE 50 MCG
1 SPRAY, SUSPENSION NASAL
Refills: 0 | Status: DISCONTINUED | OUTPATIENT
Start: 2020-12-09 | End: 2020-12-12

## 2020-12-09 RX ORDER — METOPROLOL TARTRATE 50 MG
25 TABLET ORAL DAILY
Refills: 0 | Status: DISCONTINUED | OUTPATIENT
Start: 2020-12-09 | End: 2020-12-12

## 2020-12-09 RX ORDER — OXYCODONE AND ACETAMINOPHEN 5; 325 MG/1; MG/1
2 TABLET ORAL EVERY 4 HOURS
Refills: 0 | Status: DISCONTINUED | OUTPATIENT
Start: 2020-12-09 | End: 2020-12-12

## 2020-12-09 RX ORDER — LORATADINE 10 MG/1
10 TABLET ORAL DAILY
Refills: 0 | Status: DISCONTINUED | OUTPATIENT
Start: 2020-12-09 | End: 2020-12-12

## 2020-12-09 RX ORDER — ALBUTEROL 90 UG/1
2 AEROSOL, METERED ORAL EVERY 6 HOURS
Refills: 0 | Status: DISCONTINUED | OUTPATIENT
Start: 2020-12-09 | End: 2020-12-12

## 2020-12-09 RX ORDER — AMLODIPINE BESYLATE 2.5 MG/1
1 TABLET ORAL
Qty: 0 | Refills: 0 | DISCHARGE

## 2020-12-09 RX ORDER — ALLOPURINOL 300 MG
1 TABLET ORAL
Qty: 0 | Refills: 0 | DISCHARGE

## 2020-12-09 RX ORDER — DIAZEPAM 5 MG
5 TABLET ORAL ONCE
Refills: 0 | Status: DISCONTINUED | OUTPATIENT
Start: 2020-12-09 | End: 2020-12-09

## 2020-12-09 RX ORDER — ATORVASTATIN CALCIUM 80 MG/1
1 TABLET, FILM COATED ORAL
Qty: 0 | Refills: 0 | DISCHARGE

## 2020-12-09 RX ORDER — TICAGRELOR 90 MG/1
60 TABLET ORAL EVERY 12 HOURS
Refills: 0 | Status: DISCONTINUED | OUTPATIENT
Start: 2020-12-09 | End: 2020-12-09

## 2020-12-09 RX ORDER — LEVOTHYROXINE SODIUM 125 MCG
150 TABLET ORAL DAILY
Refills: 0 | Status: DISCONTINUED | OUTPATIENT
Start: 2020-12-09 | End: 2020-12-12

## 2020-12-09 RX ADMIN — ATORVASTATIN CALCIUM 80 MILLIGRAM(S): 80 TABLET, FILM COATED ORAL at 23:17

## 2020-12-09 RX ADMIN — MORPHINE SULFATE 4 MILLIGRAM(S): 50 CAPSULE, EXTENDED RELEASE ORAL at 13:02

## 2020-12-09 RX ADMIN — HYDROMORPHONE HYDROCHLORIDE 1 MILLIGRAM(S): 2 INJECTION INTRAMUSCULAR; INTRAVENOUS; SUBCUTANEOUS at 23:17

## 2020-12-09 RX ADMIN — Medication 1 SPRAY(S): at 23:17

## 2020-12-09 RX ADMIN — MORPHINE SULFATE 4 MILLIGRAM(S): 50 CAPSULE, EXTENDED RELEASE ORAL at 12:27

## 2020-12-09 RX ADMIN — HYDROMORPHONE HYDROCHLORIDE 1 MILLIGRAM(S): 2 INJECTION INTRAMUSCULAR; INTRAVENOUS; SUBCUTANEOUS at 23:35

## 2020-12-09 RX ADMIN — HEPARIN SODIUM 5000 UNIT(S): 5000 INJECTION INTRAVENOUS; SUBCUTANEOUS at 18:13

## 2020-12-09 RX ADMIN — SODIUM CHLORIDE 1000 MILLILITER(S): 9 INJECTION INTRAMUSCULAR; INTRAVENOUS; SUBCUTANEOUS at 12:27

## 2020-12-09 RX ADMIN — SODIUM CHLORIDE 1000 MILLILITER(S): 9 INJECTION INTRAMUSCULAR; INTRAVENOUS; SUBCUTANEOUS at 13:40

## 2020-12-09 RX ADMIN — HYDROMORPHONE HYDROCHLORIDE 1 MILLIGRAM(S): 2 INJECTION INTRAMUSCULAR; INTRAVENOUS; SUBCUTANEOUS at 18:13

## 2020-12-09 RX ADMIN — PANTOPRAZOLE SODIUM 40 MILLIGRAM(S): 20 TABLET, DELAYED RELEASE ORAL at 15:09

## 2020-12-09 RX ADMIN — MORPHINE SULFATE 4 MILLIGRAM(S): 50 CAPSULE, EXTENDED RELEASE ORAL at 11:40

## 2020-12-09 RX ADMIN — HYDROMORPHONE HYDROCHLORIDE 1 MILLIGRAM(S): 2 INJECTION INTRAMUSCULAR; INTRAVENOUS; SUBCUTANEOUS at 17:30

## 2020-12-09 RX ADMIN — ONDANSETRON 4 MILLIGRAM(S): 8 TABLET, FILM COATED ORAL at 11:40

## 2020-12-09 RX ADMIN — Medication 0.5 MILLIGRAM(S): at 13:02

## 2020-12-09 RX ADMIN — OXYCODONE AND ACETAMINOPHEN 2 TABLET(S): 5; 325 TABLET ORAL at 15:10

## 2020-12-09 NOTE — H&P ADULT - NSHPPHYSICALEXAM_GEN_ALL_CORE
PHYSICAL EXAM: vital signs noted on [place]  in no apparent distress  HEENT: SKY EOMI  Neck: Supple, no JVD, no thyromegaly  Lungs: no wheeze, no crackles  CVS: S1 S2 no M/R/G  Abdomen: no tenderness, no organomegaly, BS present  Neuro: AO x 3 no focal weakness, no sensory abnormalities  power 5/5 all 4 extremities  no sensory loss  no spinal tenderness   Psych: appropriate affect  Skin: warm, dry  Ext: no cyanosis or clubbing, no edema  Msk: no joint swelling or deformities  Back: no CVA tenderness, no kyphosis/scoliosis

## 2020-12-09 NOTE — ED ADULT NURSE NOTE - OBJECTIVE STATEMENT
Pt awake, alert and oriented x 4 with PMH kidney CA with nephrectomy and MI last year with 2 cardiac stents.   Pt c/o cough for a few days; seen at urgent care with multiple negative covid swabs.   Did not check temperature at home.   Pt reports nasal congestion and sore throat with nausea - dry heaved this morning and syncopized at that time with bowel and urine incontinence.   Episode unwitnessed; pt called 911 when he woke up lying on floor.   Denies chest pain or SOB.  Pt having 10/10 back pain and back spasms since fall with difficulty turning on stretcher, standing or walking.  IVs placed, blood work sent and IV meds given.   Pt unable to sit up or roll on side to tolerate PO meds at this time.   No skin breakdown noted.

## 2020-12-09 NOTE — ED PROVIDER NOTE - PROGRESS NOTE DETAILS
Pt unable to take PO valium unable to sit up to swallow, pt screaming in discomfort intermittently, likely due to severe spasm. will rx ativan and morphine.   Dr. Wood Tejada in the ED to evaluate patient, aware or plan of admission for syncope ? seizure w/u and ? fever christie ? covid -19. Can admit to Dr. Wood Tejada. pt more comfortable w med. will admit

## 2020-12-09 NOTE — ED PROVIDER NOTE - ATTENDING CONTRIBUTION TO CARE
Attending Statement: I have reviewed and agree with all pertinent clinical information, including history and physical exam and agree with treatment plan of the PA, except as noted.  61 yo M with a PMHX of HTN, LALA, renal cell carcinoma with metastasis to lung s/p left nephrectomy and left upper lung lobectomy /wedge resections x 3, s/p  STEMI/ CAD x 2 stents, diverticulitis s/p bowel resection BIBEMS sp syncopal episode this am. Pt has had nasal drip, felt nauseous, walked to kitchen, "woke up" kitchen floor. +LOC> +incontinent of urine and bowel. Able to get up himself, walk to living room, called EMS. Denies cp or sob before or after event no palpitations. no focal numbness or weakness. Endorsing spasm of back on/off not on AC  Vital signs noted. laying down intermittent yelling for back spasm. no sign of head/facial trauma. nontender midline cervical/thoracic or lumbar spine. no step off no ecchymosis of chest/back or abdomen. no bruise noted. nl rectal tone no saddle anesthesia rectal temp 100.5 occult sent soft nontender abdomen. no  rebound. no guarding. no sign of trauma. no CVAT stable pelvis moving all ext nl rom of arm/lower ext. nl  no leg swelling or calf pain  plan sepsis workup, ct head, cxr, pain med, tba Attending Statement: I have reviewed and agree with all pertinent clinical information, including history and physical exam and agree with treatment plan of the PA, except as noted.  61 yo M with a PMHX of HTN, LALA, renal cell carcinoma with metastasis to lung s/p left nephrectomy and left upper lung lobectomy /wedge resections x 3, s/p  STEMI/ CAD x 2 stents, diverticulitis s/p bowel resection BIBEMS sp syncopal episode this am. Pt has had nasal drip, felt nauseous, walked to kitchen, "woke up" kitchen floor. +LOC> +incontinent of urine and bowel. Able to get up himself, walk to living room, called EMS. Denies cp or sob before or after event no palpitations. no focal numbness or weakness. Endorsing spasm of back on/off not on AC  Vital signs noted. laying down intermittent yelling for back spasm. no sign of head/facial trauma. nontender midline cervical/thoracic or lumbar spine. no step off no ecchymosis of chest/back or abdomen. no bruise noted. no crepitus nontender chest wall good air entry no bruise on chest. soft nontender abdomen. no  rebound. no guarding. no sign of trauma. no CVAT nl rectal tone no saddle anesthesia rectal temp 100.5 occult sent  stable pelvis moving all ext nl rom of arm/lower ext. nl  no leg swelling or calf pain  plan sepsis workup, ct head, cxr, pain med, tba

## 2020-12-09 NOTE — ED ADULT TRIAGE NOTE - CHIEF COMPLAINT QUOTE
Pt with postnasal drip and cough x one week felt nauseated this am dry heaving, Pt states found him self on the ground with incontinence of feces  does not recall how he ended   on floor co severe back pain . Pt denies sob but reports pain to chest area but states it feels like muscle pain. Pt with HX of MI and 2 cardiac stents and is currently being treated for kidney cancer with oral medication. Pt is afebrile and states was tested for covid this week and it was negative,

## 2020-12-09 NOTE — CONSULT NOTE ADULT - ASSESSMENT
60 year old male with history of HTN, LALA,  MI with HERNAN x 2 to distal RCA, renal cell carcinoma with metastasis to lung s/p left nephrectomy and left upper lung lobectomy/wedge resection, presented with postnasal drip and cough x one week with covid infection, fever, ROSEANN

## 2020-12-09 NOTE — CONSULT NOTE ADULT - SUBJECTIVE AND OBJECTIVE BOX
CARDIOLOGY CONSULT - Dr. Toussaint         HPI: Patient is a 60 year old man with history of HTN, LALA,  MI with HERNAN x 2 to distal RCA, renal cell carcinoma with metastasis to lung s/p left nephrectomy and left upper lung lobectomy/wedge resection (?), presented with postnasal drip and cough x one week, accompanied with nausea and went to throw up in kitchen sink and syncopized. Pt states found him self on the ground with incontinence of feces does not recall how he ended on floor and now c/o severe back pain with spasms . Patient denies any chest pain, dyspnea, palpitations, edema, exertional symptoms, abdominal pain, fever, chills,  or rash.       PAST MEDICAL & SURGICAL HISTORY:  HTN (hypertension)    LALA (obstructive sleep apnea)    Obesity    Diverticulitis    Incisional hernia  July 2013 &amp; 2010    H/O unilateral nephrectomy  left - 11/2014 ([facility])    History of lobectomy of lung  Left upper lobe and ?wedges (per pt) - 03/2016 at [facility]    Incisional hernia  abdominal surgery approximately 2010 &amp; 2013    Diverticulitis  2010 surgery for diverticulitis            PREVIOUS DIAGNOSTIC TESTING:    [x ] Echocardiogram:  Transthoracic Echocardiogram (05.28.20 @ 14:39)   Ejection Fraction (Teicholtz): 34 %  ------------------------------------------------------------------------  OBSERVATIONS:  Mitral Valve: Normal mitral valve. Mild mitral  regurgitation.  Aortic Root: Normal aortic root.  Aortic Valve: Normal trileaflet aortic valve. Mild aortic  regurgitation.  Left Atrium: Normal left atrium.  Left Ventricle: Endocardium not well visualized; grossly  moderate to severe segmental left ventricular systolic  function.  Hypokinesis of the inferior wall, inferolateral  wall, and inferoseptum. Normal left ventricular internal  dimensions and wall thicknesses.  Right Heart: Normal right atrium. Normal right ventricular  size with decreased right ventricular systolic function.  Normal tricuspid valve.  Minimal tricuspid regurgitation.  Normal pulmonic valve.  Pericardium/PleuraNormal pericardium with no pericardial  effusion.  ------------------------------------------------------------------------  CONCLUSIONS:  1. Normal mitral valve. Mild mitral regurgitation.  2. Normal trileaflet aortic valve. Mild aortic  regurgitation.  3. Normal left ventricular internal dimensions and wall  thicknesses.  4. Endocardium not well visualized; grossly moderate to  severe segmental left ventricular systolic function.  Hypokinesis of the inferior wall, inferolateral wall, and  inferoseptum.  5. Normal right ventricular size with decreased right  ventricular systolic function.      [ x]  Catheterization:  Cardiac Cath Lab - Adult (05.28.20 @ 12:37)   CORONARY VESSELS: The coronary circulation is right dominant.  LM:   --  LM: Normal.  LAD:   --  LAD: Angiography showed minor luminal irregularities with no  flow limiting lesions.  --  Mid LAD: There was a discrete 20 % stenosis.  CX:--  Circumflex: Angiography showed minor luminal irregularities with  no flow limiting lesions.  RCA:   --  Mid RCA: Angiography showed minor luminal irregularities with no  flow limiting lesions.  --  Distal RCA: There was a 100 % stenosis. There was NEFTALI grade 0 flow  through the vessel (no flow).  COMPLICATIONS: There were no complications.  SUMMARY:  1ST LESION INTERVENTIONS: A successful balloon angioplasty with stent was  performed on the 100 % lesion in the distal RCA. Following intervention  there was a 1 % residual stenosis.  DIAGNOSTIC RECOMMENDATIONS: PCI of distal RCA for treatment of inferior  wall STEMI.  INTERVENTIONAL RECOMMENDATIONS: Add aspirin, 81 mg, PO, daily. Add  ticagrelor 90 mg twice daily. Patient management should include aggressive  medical therapy and close monitoring of BUN and creatinine.      [ ] Stress Test:  	    MEDICATIONS:  Home Medications:  Allegra 180 mg oral tablet: 1 tab(s) orally once a day (28 May 2020 15:17)  levothyroxine 175 mcg (0.175 mg) oral tablet: 1 tab(s) orally once a day (28 May 2020 15:17)  Vitamin D3 400 intl units oral capsule: 1 cap(s) orally once a day (28 May 2020 15:17)      MEDICATIONS  (STANDING):  acetaminophen   Tablet .. 650 milliGRAM(s) Oral once  diazepam    Tablet 5 milliGRAM(s) Oral Once  sodium chloride 0.9% Bolus 1000 milliLiter(s) IV Bolus once      FAMILY HISTORY:  Family history of leukemia  father    Family history of emphysema  mother (smoker)    Family history of diabetes mellitus (Aunt, Uncle)  maternal aunts and uncles    Family history of hypertension  father        SOCIAL HISTORY:    [x ] Non-smoker  [ ] Smoker  [ ] Alcohol    Allergies    No Known Allergies    Intolerances    	    REVIEW OF SYSTEMS:  CONSTITUTIONAL: No fever, weight loss, or fatigue  EYES: No eye pain, visual disturbances, or discharge  ENMT:  No difficulty hearing, tinnitus, vertigo; No sinus or throat pain  NECK: No pain or stiffness  RESPIRATORY: No cough, wheezing, chills or hemoptysis; No Shortness of Breath  CARDIOVASCULAR: No chest pain, palpitations,, dizziness, or leg swelling, + passing out  GASTROINTESTINAL: No abdominal or epigastric pain. No vomiting, or hematemesis; No diarrhea or constipation. No melena or hematochezia., +nausea  GENITOURINARY: No dysuria, frequency, hematuria, or incontinence  NEUROLOGICAL: No headaches, memory loss, loss of strength, numbness, or tremors  SKIN: No itching, burning, rashes, or lesions   	    [x ] All others negative	see hpi   [ ] Unable to obtain    PHYSICAL EXAM:  T(C): 38.1 (12-09-20 @ 11:18), Max: 38.1 (12-09-20 @ 11:18)  HR: 97 (12-09-20 @ 11:18) (97 - 102)  BP: 147/95 (12-09-20 @ 11:18) (133/90 - 147/95)  RR: 20 (12-09-20 @ 11:18) (18 - 20)  SpO2: 94% (12-09-20 @ 11:18) (94% - 100%)  Wt(kg): --  I&O's Summary      Appearance: Normal	  Psychiatry: A & O x 3, Mood & affect appropriate  HEENT:   Normal oral mucosa, PERRL, EOMI	  Lymphatic: No lymphadenopathy  Cardiovascular: Normal S1 S2,RRR, No JVD, No murmurs  Respiratory: Lungs clear to auscultation	  Gastrointestinal:  Soft, Non-tender, + BS	  Skin: No rashes, No ecchymoses, No cyanosis	  Neurologic: Non-focal  Extremities: Normal range of motion, No clubbing, cyanosis or edema  Vascular: Peripheral pulses palpable 2+ bilaterally    TELEMETRY: 	    ECG:  	NSR 88 - without ischemic changes   RADIOLOGY:  CT Head No Cont (12.09.20 @ 11:58)   FINDINGS:  Mild prominence of the sulci and ventricles are consistent with age-appropriate volume loss. There are hemispheric white matter areas of low attenuation which are nonspecific but likely related to sequelae of microvascular disease. There is no intraparenchymal hematoma, mass effect or midline shift. No abnormal extra-axial fluid collections are present. The basal cisterns are patent.  The calvarium is intact. Orbits are incompletely visualized and unremarkable, paranasal sinuses and tympanomastoid cavities appear free of acute disease.    IMPRESSION:  Minimal volume loss, microvascular disease, no acute hemorrhage or midline shift. If symptoms persist consider follow-up head CT or MR if no contraindications.        OTHER: 	  	  LABS:	 	    CARDIAC MARKERS:                      proBNP:   Lipid Profile:   HgA1c:   TSH:

## 2020-12-09 NOTE — CONSULT NOTE ADULT - PROBLEM SELECTOR RECOMMENDATION 9
-supportive care  -maintain oxygenation saturation  -monitor fever curve  -continue airborne/contact isolation  -hold off dexamethasone and remdesevir given no PNA and not hypoxic   -supportive care  -monitor pulse ox  -contact/airborne precautions   -trend inflammatory markers

## 2020-12-09 NOTE — CONSULT NOTE ADULT - SUBJECTIVE AND OBJECTIVE BOX
HPI: Mr. Garcia is a 60 year-old man with history of multiple medical issues including hypertension, coronary artery disease, biventricular CHF, stage 4 renal cell carcinoma (lung mets) s/p left nephrectomy 2014/left lobectomies 2016, who presented this a.m. to the [facility] ER with nausea/vomiting since this a.m., and s/p episode of loss of consciousness this a.m. He attests as well to postnasal drip and cough for 1 week, and severe back spasms for the past few days. He attests to incontinence of feces today, in setting of LOC. He was noted in the ER to have fever.  I see that he is s/p 2L of NS in boluses, in the ER.  PAST MEDICAL & SURGICAL HISTORY: HTN (hypertension) LALA (obstructive sleep apnea) Obesity Biventricular systolic CHF Coronary artery disease - stents Stage 4 renal cell CA==>Lung; H/O unilateral nephrectomy -left - 11/2014 ([facility]) History of lobectomy of lung -Left upper lobe and ?wedges (per pt) - 03/2016 at [facility] hernia -abdominal surgery approximately 2010 &amp; 2013 Diverticulitis -2010 surgery for diverticulitis  Allergies No Known Allergies  SOCIAL HISTORY: Denies ETOh,Smoking,   FAMILY HISTORY: Family history of leukemia -father Family history of emphysema -mother (smoker) Family history of diabetes mellitus (Aunt, Uncle) -maternal aunts and uncles Family history of hypertension -father  REVIEW OF SYSTEMS: CONSTITUTIONAL: (+)weakness; no fevers or chills EYES/ENT: (+)rhinorrhea NECK: No pain or stiffness RESPIRATORY: (+)cough; no SOB CARDIOVASCULAR: No chest pain or palpitations; (+)syncope GASTROINTESTINAL: (+)nausea, (+)vomiting; (+)fecal incontinence GENITOURINARY: No dysuria, frequency or hematuria MUSCULOSKELETAL: (+)back pain NEUROLOGICAL: No numbness or weakness SKIN: No itching, burning, rashes, or lesions  All other review of systems is negative unless indicated above.  VITAL: T(C): , Max: 38.1 (12-09-20 @ 11:18) T(F): , Max: 100.5 (12-09-20 @ 11:18) HR: 84 (12-09-20 @ 12:55) BP: 130/92 (12-09-20 @ 12:55) BP(mean): 101 (12-09-20 @ 12:55) RR: 19 (12-09-20 @ 12:55) SpO2: 99% (12-09-20 @ 12:55)  PHYSICAL EXAM: Constitutional: NAD, Alert HEENT: NCAT, MMM Neck: Supple, No JVD Respiratory: CTA-b/l Cardiovascular: RRR s1s2, no m/r/g Gastrointestinal: BS+, soft, NT/ND Extremities: No peripheral edema b/l Neurological: no focal deficits; strength grossly intact Back: no CVAT b/l Skin: No rashes, no nevi  LABS:                      17.4  5.46  )-----------( 117      ( 09 Dec 2020 12:31 )            53.8   Na(135)/K(4.7)/Cl(98)/HCO3(23)/BUN(27)/Cr(2.01)Glu(143)/Ca(9.2)/Mg(--)/PO4(--)    12-09 @ 12:31  (6/11/20) - BUN/creatinine: 31/1.32 (1/2/2018)- BUN/creatinine: 21/1.65; UA - 300prot, 5-10RBC, 2-5WBC  IMAGING: < from: Xray Chest 1 View AP/PA (12.09.20 @ 12:09) > Redemonstrated right hemithorax postsurgical changes including volume loss, right hemidiaphragm elevation with right pleural reaction, posterior right 6th rib surgical defect, and surgical clips and chain staples in right hilar and right basilar regions. Clear remaining visualized lungs. Sharp left CP angle. No pneumothorax. Stable cardiac and mediastinal silhouettes. Trachea midline. Spinal degenerative changes again noted. Multiple surgical clips clustered in left epigastric region.   ASSESSMENT: (1)Renal - CKD3b - proteinuric, based on urine studies from 2018 - in part due to solitary functioning kidney. His proteinuria may very radha be from Votrient. (tyrosine kinase inhibitor for RCC). It appears quite likely that the Votrient has chronically impacted the kidneys over time...  (2)ROSEANN - likely a superimposed component of prerenal azotemia; he is at particular risk for it given his biventricular CHF. Is he dry at present? On Losartan at home - I would hold it for now.  (3)CV - syncope versus seizure  RECOMMEND: (1)Urine: lytes, creatinine, UA, protein (2)No ACEI/ARB for now (3)Can forgo further IVF for now, as he is s/p 2L in the ER. No diuretics for now (4)Trend orthostatics (5)Heme input to assist in determination of whether he should be maintained on the tyrosine kinase inhibitor (6)BMP daily (7)Meds for GFR 30-40ml/min (present dosing is acceptable)  Thank you for involving [facility] in this patient's care.  With warm regards,  Jason Phoenix MD  [facility] Office: [phone] Cell: [phone]        HPI: Mr. Garcia is a 60 year-old man with history of multiple medical issues including hypertension, coronary artery disease, biventricular CHF, stage 4 renal cell carcinoma (lung mets) s/p left nephrectomy 2014/left lobectomies 2016, who presented this a.m. to the [facility] ER with nausea/vomiting since this a.m., and s/p episode of loss of consciousness this a.m. He attests as well to postnasal drip and cough for 1 week, and severe back spasms for the past few days. He attests to incontinence of feces today, in setting of LOC. He was noted in the ER to have fever.  Mr. Garcia shares that his creatinine was 1.6mg/dL as of yesterday (which is close to his basleine). I see that he is s/p 2L of NS in boluses, in the ER.  PAST MEDICAL & SURGICAL HISTORY: HTN (hypertension) LALA (obstructive sleep apnea) Obesity Biventricular systolic CHF Coronary artery disease - stents Stage 4 renal cell CA==>Lung; H/O unilateral nephrectomy -left - 11/2014 ([facility]) History of lobectomy of lung -Left upper lobe and ?wedges (per pt) - 03/2016 at [facility] hernia -abdominal surgery approximately 2010 &amp; 2013 Diverticulitis -2010 surgery for diverticulitis  Allergies No Known Allergies  SOCIAL HISTORY: Denies ETOh,Smoking,   FAMILY HISTORY: Family history of leukemia -father Family history of emphysema -mother (smoker) Family history of diabetes mellitus (Aunt, Uncle) -maternal aunts and uncles Family history of hypertension -father  REVIEW OF SYSTEMS: CONSTITUTIONAL: (+)weakness; no fevers or chills EYES/ENT: (+)rhinorrhea NECK: No pain or stiffness RESPIRATORY: (+)cough; no SOB CARDIOVASCULAR: No chest pain or palpitations; (+)syncope GASTROINTESTINAL: (+)nausea, (+)vomiting; (+)fecal incontinence GENITOURINARY: No dysuria, frequency or hematuria MUSCULOSKELETAL: (+)back pain NEUROLOGICAL: No numbness or weakness SKIN: No itching, burning, rashes, or lesions  All other review of systems is negative unless indicated above.  VITAL: T(C): , Max: 38.1 (12-09-20 @ 11:18) T(F): , Max: 100.5 (12-09-20 @ 11:18) HR: 84 (12-09-20 @ 12:55) BP: 130/92 (12-09-20 @ 12:55) BP(mean): 101 (12-09-20 @ 12:55) RR: 19 (12-09-20 @ 12:55) SpO2: 99% (12-09-20 @ 12:55)  PHYSICAL EXAM: Constitutional: NAD, Alert HEENT: NCAT, MMM Neck: Supple, No JVD Respiratory: CTA-b/l Cardiovascular: RRR s1s2, no m/r/g Gastrointestinal: BS+, soft, NT/ND Extremities: No peripheral edema b/l Neurological: no focal deficits; strength grossly intact Back: no CVAT b/l Skin: No rashes, no nevi  LABS:                      17.4  5.46  )-----------( 117      ( 09 Dec 2020 12:31 )            53.8   Na(135)/K(4.7)/Cl(98)/HCO3(23)/BUN(27)/Cr(2.01)Glu(143)/Ca(9.2)/Mg(--)/PO4(--)    12-09 @ 12:31  (6/11/20) - BUN/creatinine: 31/1.32 (1/2/2018)- BUN/creatinine: 21/1.65; UA - 300prot, 5-10RBC, 2-5WBC  IMAGING: < from: Xray Chest 1 View AP/PA (12.09.20 @ 12:09) > Redemonstrated right hemithorax postsurgical changes including volume loss, right hemidiaphragm elevation with right pleural reaction, posterior right 6th rib surgical defect, and surgical clips and chain staples in right hilar and right basilar regions. Clear remaining visualized lungs. Sharp left CP angle. No pneumothorax. Stable cardiac and mediastinal silhouettes. Trachea midline. Spinal degenerative changes again noted. Multiple surgical clips clustered in left epigastric region.   ASSESSMENT: (1)Renal - CKD3b - proteinuric, based on urine studies from 2018 - in part due to solitary functioning kidney. His proteinuria may very radha be from Votrient. (tyrosine kinase inhibitor for RCC). It appears quite likely that the Votrient has chronically impacted the kidneys over time...  (2)ROSEANN - likely a superimposed component of prerenal azotemia; he is at particular risk for it given his biventricular CHF. Is he dry at present? On Losartan at home - I would hold it for now.  (3)CV - syncope versus seizure  RECOMMEND: (1)Urine: lytes, creatinine, UA, protein (2)No ACEI/ARB for now (3)Can forgo further IVF for now, as he is s/p 2L in the ER. No diuretics for now (4)Trend orthostatics (5)Heme input to assist in determination of whether he should be maintained on the tyrosine kinase inhibitor (6)BMP daily (7)Meds for GFR 30-40ml/min (present dosing is acceptable)  Thank you for involving [facility] in this patient's care.  With warm regards,  Jason Phoenix MD  [facility] Office: [phone] Cell: [phone]        HPI: Mr. Garcia is a 60 year-old man with history of multiple medical issues including hypertension, coronary artery disease, biventricular CHF, stage 4 renal cell carcinoma (lung mets) s/p left nephrectomy 2014/left lobectomies 2016, who presented this a.m. to the [facility] ER with nausea/vomiting since this a.m., and s/p episode of loss of consciousness this a.m. He attests as well to postnasal drip and cough for 1 week, and severe back spasms for the past few days. He attests to incontinence of feces today, in setting of LOC. He was noted in the ER to have fever.  Mr. Garcia shares that his creatinine was 1.6mg/dL as of yesterday (which is close to his baseline). He denies NSAID use. He denies recent urinary changes. He states that he has had proteinuria for several years, including back to prior to his diagnosis of RCC. He used to see nephrologist Dr. Rosalio Cain for it; he has not followed with Dr. Cain for years. He states that Votrient is "a life-saver" for him. He has been on it for 3 years. In fact, it was discontinued in favor of another agent months ago when he suffered a heart attack, but his RCC started to worsen soon afterwards, such that he required placement back onto the Votrient. His main complaints at present is back spasms.    I see that he is s/p 2L of NS in boluses, in the ER.  PAST MEDICAL & SURGICAL HISTORY: HTN (hypertension) LALA (obstructive sleep apnea) Obesity Biventricular systolic CHF Coronary artery disease - stents Stage 4 renal cell CA==>Lung; H/O unilateral nephrectomy -left - 11/2014 ([facility]) History of lobectomy of lung -Left upper lobe and ?wedges (per pt) - 03/2016 at [facility] hernia -abdominal surgery approximately 2010 &amp; 2013 Diverticulitis -2010 surgery for diverticulitis  Allergies No Known Allergies  SOCIAL HISTORY: Denies ETOh,Smoking,   FAMILY HISTORY: Family history of leukemia -father Family history of emphysema -mother (smoker) Family history of diabetes mellitus (Aunt, Uncle) -maternal aunts and uncles Family history of hypertension -father  REVIEW OF SYSTEMS: CONSTITUTIONAL: (+)weakness; no fevers or chills EYES/ENT: (+)rhinorrhea NECK: No pain or stiffness RESPIRATORY: (+)cough; no SOB CARDIOVASCULAR: No chest pain or palpitations; (+)syncope GASTROINTESTINAL: (+)nausea, (+)vomiting; (+)fecal incontinence GENITOURINARY: No dysuria, frequency or hematuria MUSCULOSKELETAL: (+)back pain NEUROLOGICAL: No numbness or weakness SKIN: No itching, burning, rashes, or lesions  All other review of systems is negative unless indicated above.  VITAL: T(C): , Max: 38.1 (12-09-20 @ 11:18) T(F): , Max: 100.5 (12-09-20 @ 11:18) HR: 84 (12-09-20 @ 12:55) BP: 130/92 (12-09-20 @ 12:55) BP(mean): 101 (12-09-20 @ 12:55) RR: 19 (12-09-20 @ 12:55) SpO2: 99% (12-09-20 @ 12:55)  PHYSICAL EXAM: Constitutional: NAD, Alert HEENT: NCAT, MMM Neck: Supple, No JVD Respiratory: CTA-b/l Cardiovascular: RRR s1s2, no m/r/g Gastrointestinal: BS+, soft, (+)distension, NT Extremities: trace b/l LE edema Neurological: no focal deficits; strength grossly intact Back: no CVAT b/l Skin: No rashes, no nevi  LABS:                      17.4  5.46  )-----------( 117      ( 09 Dec 2020 12:31 )            53.8   Na(135)/K(4.7)/Cl(98)/HCO3(23)/BUN(27)/Cr(2.01)Glu(143)/Ca(9.2)/Mg(--)/PO4(--)    12-09 @ 12:31  (6/11/20) - BUN/creatinine: 31/1.32 (1/2/2018)- BUN/creatinine: 21/1.65; UA - 300prot, 5-10RBC, 2-5WBC  IMAGING: < from: Xray Chest 1 View AP/PA (12.09.20 @ 12:09) > Redemonstrated right hemithorax postsurgical changes including volume loss, right hemidiaphragm elevation with right pleural reaction, posterior right 6th rib surgical defect, and surgical clips and chain staples in right hilar and right basilar regions. Clear remaining visualized lungs. Sharp left CP angle. No pneumothorax. Stable cardiac and mediastinal silhouettes. Trachea midline. Spinal degenerative changes again noted. Multiple surgical clips clustered in left epigastric region.   ASSESSMENT: (1)Renal - CKD3b - proteinuric, based on urine studies from 2018 - in part due to solitary functioning kidney. 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 This underlying process has likely resulted in a component of baseline azotemia.  (2)ROSEANN - likely a superimposed component of prerenal azotemia; he is at particular risk for it given his biventricular CHF. Is he dry at present? On Losartan at home - I would hold it for now.  (3)CV - syncope versus seizure  RECOMMEND: (1)Urine: lytes, creatinine, UA, protein (2)No ACEI/ARB for now (3)Can forgo further IVF for now, as he is s/p 2L in the ER. No diuretics for now (4)Trend orthostatics (5)BMP daily (6)Meds for GFR 30-40ml/min (present dosing is acceptable) (7)No objection to use of Votrient as taken at home (8)No NSAIDs for back pain  Thank you for involving [facility] in this patient's care.  With warm regards,  Jason Phoenix MD  [facility] Office: [phone] Cell: [phone]

## 2020-12-09 NOTE — PHARMACOTHERAPY INTERVENTION NOTE - COMMENTS
Medications in OMR verified with [facility], [facility] and medical record.

## 2020-12-09 NOTE — ED ADULT NURSE NOTE - NSIMPLEMENTINTERV_GEN_ALL_ED
Implemented All Fall with Harm Risk Interventions:  [place] to call system. Call bell, personal items and telephone within reach. Instruct patient to call for assistance. Room bathroom lighting operational. Non-slip footwear when patient is off stretcher. Physically safe environment: no spills, clutter or unnecessary equipment. Stretcher in lowest position, wheels locked, appropriate side rails in place. Provide visual cue, wrist band, yellow gown, etc. Monitor gait and stability. Monitor for mental status changes and reorient to person, place, and time. Review medications for side effects contributing to fall risk. Reinforce activity limits and safety measures with patient and family. Provide visual clues: red socks.

## 2020-12-09 NOTE — H&P ADULT - PROBLEM SELECTOR PLAN 4
continue home meds with hold parameters   acceptable for now concerning severity s/p fall  will continue pain management with Dilaudid IV severe and po percocet mod pain  urgent imaging of thoracolumbar spine CT non con

## 2020-12-09 NOTE — ED PROVIDER NOTE - CLINICAL SUMMARY MEDICAL DECISION MAKING FREE TEXT BOX
59 yo M with a PMHX of HTN, LALA, renal cell carcinoma with metastasis to lung s/p left nephrectomy and left upper lung lobectomy /wedge resections x 3, s/p  STEMI/ CAD x 2 stents, diverticulitis s/p bowel resection here with complaints of nasal congestion, post nasal drip, cough that has been going on for 3 weeks, went away and came back recently, pt has been seen outside hospital for complaints was given albuterol. This am felt nauseated, walked from cough to kitchen and dry heaved, pt then unsure what happened woke up on floor, incontinent of urine and stool. pt was able to get up after fall and walk to couch, called EMS, pt with progressively worsening total back pain, intermittent severe spasms since fall.   labs, cultures, cardiacs cxr, ct head, ekg, rvp, urine studies  pain control.

## 2020-12-09 NOTE — ED PROVIDER NOTE - PSH
Diverticulitis  2010 surgery for diverticulitis  H/O unilateral nephrectomy  left - 11/2014 ([facility])  History of lobectomy of lung  Left upper lobe and ?wedges (per pt) - 03/2016 at [facility]  Incisional hernia  abdominal surgery approximately 2010 & 2013

## 2020-12-09 NOTE — H&P ADULT - PROBLEM SELECTOR PLAN 2
concerning severity s/p fall  will continue pain management with Dilaudid IV severe and po percocet mod pain  urgent imaging of thoracolumbar spine CT non con unclear etiology  will await blood cultures and urine culture   no antibiotics  ID evaluation pending  COVID 19 swab pending

## 2020-12-09 NOTE — ED PROVIDER NOTE - MUSCULOSKELETAL, MLM
Spine appears normal, range of motion is not limited, no muscle or joint tenderness. + right sided muscle spasm

## 2020-12-09 NOTE — CONSULT NOTE ADULT - ASSESSMENT
echo 5/28/20: EF 34%,  grossly moderate to severe segmental left ventricular systolic function Hypokinesis of the inferior wall, inferolateral wall, and inferoseptum  Cath 5/28/20: HERNAN x 2 to distal RCA    a/p  Patient is a 60 year old man with history of HTN, LALA,  MI with HERNAN x 2 to distal RCA, renal cell carcinoma with metastasis to lung s/p left nephrectomy and left upper lung lobectomy/wedge resection (?), presenting with syncopal episode    1. ?Syncope  -likely vasovagal  vs ?seizure with loss of bowel function  -CT head noted  -would check orthostatics once pt able to ambulate     2. CAD  -s/p HERNAN x 2 to distal RCA in May 2020  -CV stable  -TTE 5/28 with EF 34%, moderate to severe segmental LV systolic dysfunction; Hypokinesis of the inferior wall, inferolateral wall, and inferoseptum  -resume outpatient meds: ASA, Brilinta 90 mg bid,  Atorvastatin 80 mg daily, Metoprolol tarte, 12.5 BID  -if cr stable can resume 2.5 mg Lisinopril     3. Ischemic Cardiomyopathy  -stable, no clinical hf, no diuretic need  -continue bb   -TTE 5/28 with EF 34%, moderate to severe segmental LV systolic dysfunction; Hypokinesis of the inferior wall, inferolateral wall, and inferoseptum    4. CKD  -trend creat    5. Back pain   -mgmt and imaging per ED    dvt ppx    echo 5/28/20: EF 34%,  grossly moderate to severe segmental left ventricular systolic function Hypokinesis of the inferior wall, inferolateral wall, and inferoseptum  Cath 5/28/20: HERNAN x 2 to distal RCA    a/p  Patient is a 60 year old man with history of HTN, LALA,  MI with HERNAN x 2 to distal RCA, renal cell carcinoma with metastasis to lung s/p left nephrectomy and left upper lung lobectomy/wedge resection (?), presenting with syncopal episode    1. ?Syncope  -likely vasovagal  vs ?seizure with loss of bowel function  -CT head noted  -would check orthostatics once pt able to ambulate   -EKG without ischemic changes    2. CAD  -s/p HERNAN x 2 to distal RCA in May 2020  -CV stable  -TTE 5/28 with EF 34%, moderate to severe segmental LV systolic dysfunction; Hypokinesis of the inferior wall, inferolateral wall, and inferoseptum  -resume outpatient meds: ASA, Brilinta 90 mg bid,  Atorvastatin 80 mg daily, Metoprolol tarte, 12.5 BID  -if cr stable can resume 2.5 mg Lisinopril     3. Ischemic Cardiomyopathy  -stable, no clinical hf, no diuretic need  -continue bb   -TTE 5/28 with EF 34%, moderate to severe segmental LV systolic dysfunction; Hypokinesis of the inferior wall, inferolateral wall, and inferoseptum    4. CKD  -trend creat    5. Back pain   -mgmt and imaging per ED    dvt ppx

## 2020-12-09 NOTE — H&P ADULT - ASSESSMENT
60 year old male with history of HTN, LALA,  MI with HERNAN x 2 to distal RCA, renal cell carcinoma with metastasis to lung s/p left nephrectomy and left upper lung lobectomy/wedge resection admitted with syncope

## 2020-12-09 NOTE — H&P ADULT - HISTORY OF PRESENT ILLNESS
60 year old male with history of HTN, LALA,  MI with HERNAN x 2 to distal RCA, renal cell carcinoma with metastasis to lung s/p left nephrectomy and left upper lung lobectomy/wedge resection, presented with postnasal drip and cough x one week, accompanied with nausea and went to throw up in kitchen sink and states he lost consciousness with new onset severe back pain and spasms. The patient also had incontinence of feces. He does not recall how he ended on floor. Patient denies any chest pain, dyspnea, palpitations, edema, exertional symptoms, abdominal pain, fever, chills,  or rash.

## 2020-12-09 NOTE — CONSULT NOTE ADULT - SUBJECTIVE AND OBJECTIVE BOX
EMLI ARREOLA 60y Male  MRN-[id number]    Patient is a 60y old  Male who presents with a chief complaint of syncope (09 Dec 2020 14:55)      HPI:  60 year old male with history of HTN, LALA,  MI with HERNAN x 2 to distal RCA, renal cell carcinoma with metastasis to lung s/p left nephrectomy and left upper lung lobectomy/wedge resection, presented with postnasal drip and cough x one week, accompanied with nausea and went to throw up in kitchen sink and states he lost consciousness with new onset severe back pain and spasms. The patient also had incontinence of feces. He does not recall how he ended on floor. Patient denies any chest pain, dyspnea, palpitations, edema, exertional symptoms, abdominal pain, fever, chills,  or rash. (09 Dec 2020 14:11)    COVID positive in ER. Wife also staring to feel ill today. Thinks he got it from someone at work.      PAST MEDICAL & SURGICAL HISTORY:  HTN (hypertension)    LALA (obstructive sleep apnea)    Obesity    Diverticulitis    Incisional hernia  July 2013 &amp; 2010    H/O unilateral nephrectomy  left - 11/2014 ([facility])    History of lobectomy of lung  Left upper lobe and ?wedges (per pt) - 03/2016 at [facility]    Incisional hernia  abdominal surgery approximately 2010 &amp; 2013    Diverticulitis  2010 surgery for diverticulitis        Allergies    No Known Allergies    Intolerances        ANTIMICROBIALS:      MEDICATIONS  (STANDING):  allopurinol 100 milliGRAM(s) Oral daily  amLODIPine   Tablet 10 milliGRAM(s) Oral daily  aspirin enteric coated 81 milliGRAM(s) Oral daily  atorvastatin 80 milliGRAM(s) Oral at bedtime  colchicine 0.6 milliGRAM(s) Oral daily  fluticasone propionate 50 MICROgram(s)/spray Nasal Spray 1 Spray(s) Both Nostrils two times a day  heparin   Injectable 5000 Unit(s) SubCutaneous every 12 hours  levothyroxine 150 MICROGram(s) Oral daily  losartan 25 milliGRAM(s) Oral daily  metoprolol succinate ER 25 milliGRAM(s) Oral daily  pazopanib 600 milliGRAM(s) Oral daily  ticagrelor 60 milliGRAM(s) Oral every 12 hours      Social History  Smoking:  Etoh:  Drug use:      FAMILY HISTORY:  Family history of leukemia  father    Family history of emphysema  mother (smoker)    Family history of diabetes mellitus (Aunt, Uncle)  maternal aunts and uncles    Family history of hypertension  father        Vital Signs Last 24 Hrs  T(C): 36.7 (09 Dec 2020 12:55), Max: 38.1 (09 Dec 2020 11:18)  T(F): 98 (09 Dec 2020 12:55), Max: 100.5 (09 Dec 2020 11:18)  HR: 88 (09 Dec 2020 15:00) (84 - 102)  BP: 129/89 (09 Dec 2020 15:00) (129/89 - 147/95)  BP(mean): 101 (09 Dec 2020 12:55) (101 - 101)  RR: 20 (09 Dec 2020 15:00) (18 - 20)  SpO2: 98% (09 Dec 2020 15:00) (94% - 100%)    CBC Full  -  ( 09 Dec 2020 12:31 )  WBC Count : 5.46 K/uL  RBC Count : 6.35 M/uL  Hemoglobin : 17.4 g/dL  Hematocrit : 53.8 %  Platelet Count - Automated : 117 K/uL  Mean Cell Volume : 84.7 fL  Mean Cell Hemoglobin : 27.4 pg  Mean Cell Hemoglobin Concentration : 32.3 gm/dL  Auto Neutrophil # : 4.09 K/uL  Auto Lymphocyte # : 0.57 K/uL  Auto Monocyte # : 0.76 K/uL  Auto Eosinophil # : 0.00 K/uL  Auto Basophil # : 0.01 K/uL  Auto Neutrophil % : 75.0 %  Auto Lymphocyte % : 10.4 %  Auto Monocyte % : 13.9 %  Auto Eosinophil % : 0.0 %  Auto Basophil % : 0.2 %    12-09    135  |  98  |  27<H>  ----------------------------<  143<H>  4.7   |  23  |  2.01<H>    Ca    9.2      09 Dec 2020 12:31    TPro  7.8  /  Alb  3.7  /  TBili  1.1  /  DBili  x   /  AST  44<H>  /  ALT  41  /  AlkPhos  96  12-09    LIVER FUNCTIONS - ( 09 Dec 2020 12:31 )  Alb: 3.7 g/dL / Pro: 7.8 g/dL / ALK PHOS: 96 U/L / ALT: 41 U/L / AST: 44 U/L / GGT: x               MICROBIOLOGY:      SARS-CoV-2: Detected: This Respiratory Panel uses polymerase chain reaction (PCR) to detect for   adenovirus; coronavirus (HKU1, NL63, 229E, OC43); human metapneumovirus   (hMPV); human enterovirus/rhinovirus (Entero/RV); influenza A; influenza   A/H1; influenza A/H3; influenza A/H1-2009; influenza B; parainfluenza   viruses 1, 2, 3, 4; respiratory syncytial virus; Mycoplasma pneumoniae;   Rapid RVP Result: Detected (12-09 @ 13:56)        RADIOLOGY  < from: CT Chest No Cont (12.09.20 @ 16:00) >  No pneumonia.    New left supraclavicular and mediastinal lymphadenopathy since 2015.    Post right upper lobectomy and partial right lower lobe resection with associated volume loss and architectural distortion.      < from: CT Lumbar Spine No Cont (12.09.20 @ 14:48) >  -No acute fracture or dislocation.    -Right paratracheal suspected pathologic lymph node, new since 1/6/2015 CT. Recommend comparison with intervening recent CT versus performing CT of the chest for further evaluation

## 2020-12-09 NOTE — H&P ADULT - NSICDXPASTSURGICALHX_GEN_ALL_CORE_FT
PAST SURGICAL HISTORY:  Diverticulitis 2010 surgery for diverticulitis    H/O unilateral nephrectomy left - 11/2014 ([facility])    History of lobectomy of lung Left upper lobe and ?wedges (per pt) - 03/2016 at [facility]    Incisional hernia abdominal surgery approximately 2010 & 2013

## 2020-12-09 NOTE — ED ADULT NURSE NOTE - PSH
Diverticulitis  2010 surgery for diverticulitis  H/O unilateral nephrectomy  left - 11/2014 ([facility])  History of lobectomy of lung  Left upper lobe and ?wedges (per pt) - 03/2016 at [facility]  Incisional hernia  abdominal surgery approximately 2010 & 2013

## 2020-12-09 NOTE — H&P ADULT - PROBLEM SELECTOR PLAN 3
no chest pain  defer recommendations to cardiology likely secondary to nausea, vomiting and dehydration   will hydrate  renal evaluation noted

## 2020-12-09 NOTE — ED PROVIDER NOTE - OBJECTIVE STATEMENT
61 yo M with a PMHX of HTN, LALA, renal cell carcinoma with metastasis to lung s/p left nephrectomy and left upper lung lobectomy /wedge resections x 3, s/p  STEMI/ CAD x 2 stents 61 yo M with a PMHX of HTN, LALA, renal cell carcinoma with metastasis to lung s/p left nephrectomy and left upper lung lobectomy /wedge resections x 3, s/p  STEMI/ CAD x 2 stents here with complaints of nasal 61 yo M with a PMHX of HTN, LALA, renal cell carcinoma with metastasis to lung s/p left nephrectomy and left upper lung lobectomy /wedge resections x 3, s/p  STEMI/ CAD x 2 stents, diverticulitis s/p bowel resection here with complaints of nasal congestion, post nasal drip, cough that has been going on for 3 weeks, went away and came back recently, pt has been seen outside hospital for complaints was given albuterol. This am felt nauseated, walked from cough to kitchen and dry heaved, pt then unsure what happened woke up on floor, incontinent of urine and stool. pt was able to get up after fall and walk to couch, called EMS, pt with progressively worsening total back pain, intermittent severe spasms since fall. Denies headache, neck pain, weakness, numbness tingling. Denies cp, sob, fevers. Denies dizziness/ lightheadedness, vomiting, abdominal pain, melena, BRBPR, dysuria, hematuria.

## 2020-12-09 NOTE — H&P ADULT - PROBLEM SELECTOR PLAN 1
unclear etiology  likely vagally mediated  cardiology help appreciated  will continue to follow  recommendations  echocardiogram if cardiology feels needed  at least part of the etiology is dehydration as patient has ROSEANN and is clinically dry

## 2020-12-10 DIAGNOSIS — U07.1 COVID-19: ICD-10-CM

## 2020-12-10 LAB
ALBUMIN SERPL ELPH-MCNC: 3.4 G/DL — SIGNIFICANT CHANGE UP (ref 3.3–5)
ALP SERPL-CCNC: 85 U/L — SIGNIFICANT CHANGE UP (ref 40–120)
ALT FLD-CCNC: 45 U/L — HIGH (ref 4–41)
ANION GAP SERPL CALC-SCNC: 13 MMOL/L — SIGNIFICANT CHANGE UP (ref 7–14)
AST SERPL-CCNC: 49 U/L — HIGH (ref 4–40)
BILIRUB SERPL-MCNC: 1 MG/DL — SIGNIFICANT CHANGE UP (ref 0.2–1.2)
BUN SERPL-MCNC: 30 MG/DL — HIGH (ref 7–23)
CALCIUM SERPL-MCNC: 8.3 MG/DL — LOW (ref 8.4–10.5)
CHLORIDE SERPL-SCNC: 101 MMOL/L — SIGNIFICANT CHANGE UP (ref 98–107)
CO2 SERPL-SCNC: 21 MMOL/L — LOW (ref 22–31)
CREAT SERPL-MCNC: 1.56 MG/DL — HIGH (ref 0.5–1.3)
GLUCOSE SERPL-MCNC: 99 MG/DL — SIGNIFICANT CHANGE UP (ref 70–99)
POTASSIUM SERPL-MCNC: 4.5 MMOL/L — SIGNIFICANT CHANGE UP (ref 3.5–5.3)
POTASSIUM SERPL-SCNC: 4.5 MMOL/L — SIGNIFICANT CHANGE UP (ref 3.5–5.3)
PROT SERPL-MCNC: 7 G/DL — SIGNIFICANT CHANGE UP (ref 6–8.3)
SODIUM SERPL-SCNC: 135 MMOL/L — SIGNIFICANT CHANGE UP (ref 135–145)
TSH SERPL-MCNC: 15.52 UIU/ML — HIGH (ref 0.27–4.2)
VIT B12 SERPL-MCNC: 736 PG/ML — SIGNIFICANT CHANGE UP (ref 200–900)

## 2020-12-10 RX ORDER — SODIUM CHLORIDE 9 MG/ML
1000 INJECTION INTRAMUSCULAR; INTRAVENOUS; SUBCUTANEOUS
Refills: 0 | Status: DISCONTINUED | OUTPATIENT
Start: 2020-12-10 | End: 2020-12-11

## 2020-12-10 RX ORDER — GABAPENTIN 400 MG/1
100 CAPSULE ORAL
Refills: 0 | Status: DISCONTINUED | OUTPATIENT
Start: 2020-12-10 | End: 2020-12-12

## 2020-12-10 RX ORDER — TIZANIDINE 4 MG/1
2 TABLET ORAL THREE TIMES A DAY
Refills: 0 | Status: DISCONTINUED | OUTPATIENT
Start: 2020-12-10 | End: 2020-12-12

## 2020-12-10 RX ORDER — PANTOPRAZOLE SODIUM 20 MG/1
40 TABLET, DELAYED RELEASE ORAL ONCE
Refills: 0 | Status: COMPLETED | OUTPATIENT
Start: 2020-12-10 | End: 2020-12-10

## 2020-12-10 RX ORDER — PANTOPRAZOLE SODIUM 20 MG/1
40 TABLET, DELAYED RELEASE ORAL
Refills: 0 | Status: DISCONTINUED | OUTPATIENT
Start: 2020-12-11 | End: 2020-12-12

## 2020-12-10 RX ORDER — ONDANSETRON 8 MG/1
4 TABLET, FILM COATED ORAL ONCE
Refills: 0 | Status: COMPLETED | OUTPATIENT
Start: 2020-12-10 | End: 2020-12-10

## 2020-12-10 RX ADMIN — Medication 5 MILLIGRAM(S): at 11:16

## 2020-12-10 RX ADMIN — ATORVASTATIN CALCIUM 80 MILLIGRAM(S): 80 TABLET, FILM COATED ORAL at 21:30

## 2020-12-10 RX ADMIN — TICAGRELOR 90 MILLIGRAM(S): 90 TABLET ORAL at 17:51

## 2020-12-10 RX ADMIN — OXYCODONE AND ACETAMINOPHEN 2 TABLET(S): 5; 325 TABLET ORAL at 17:09

## 2020-12-10 RX ADMIN — Medication 100 MILLIGRAM(S): at 11:03

## 2020-12-10 RX ADMIN — TICAGRELOR 90 MILLIGRAM(S): 90 TABLET ORAL at 05:38

## 2020-12-10 RX ADMIN — OXYCODONE AND ACETAMINOPHEN 2 TABLET(S): 5; 325 TABLET ORAL at 11:09

## 2020-12-10 RX ADMIN — PANTOPRAZOLE SODIUM 40 MILLIGRAM(S): 20 TABLET, DELAYED RELEASE ORAL at 18:31

## 2020-12-10 RX ADMIN — OXYCODONE AND ACETAMINOPHEN 2 TABLET(S): 5; 325 TABLET ORAL at 05:38

## 2020-12-10 RX ADMIN — Medication 150 MICROGRAM(S): at 05:38

## 2020-12-10 RX ADMIN — SODIUM CHLORIDE 50 MILLILITER(S): 9 INJECTION INTRAMUSCULAR; INTRAVENOUS; SUBCUTANEOUS at 11:03

## 2020-12-10 RX ADMIN — Medication 0.6 MILLIGRAM(S): at 11:03

## 2020-12-10 RX ADMIN — ONDANSETRON 4 MILLIGRAM(S): 8 TABLET, FILM COATED ORAL at 09:55

## 2020-12-10 RX ADMIN — Medication 81 MILLIGRAM(S): at 11:03

## 2020-12-10 RX ADMIN — OXYCODONE AND ACETAMINOPHEN 2 TABLET(S): 5; 325 TABLET ORAL at 16:09

## 2020-12-10 RX ADMIN — AMLODIPINE BESYLATE 10 MILLIGRAM(S): 2.5 TABLET ORAL at 05:38

## 2020-12-10 RX ADMIN — OXYCODONE AND ACETAMINOPHEN 2 TABLET(S): 5; 325 TABLET ORAL at 10:10

## 2020-12-10 RX ADMIN — Medication 25 MILLIGRAM(S): at 05:38

## 2020-12-10 RX ADMIN — Medication 1 SPRAY(S): at 05:38

## 2020-12-10 RX ADMIN — OXYCODONE AND ACETAMINOPHEN 2 TABLET(S): 5; 325 TABLET ORAL at 06:10

## 2020-12-10 RX ADMIN — HEPARIN SODIUM 5000 UNIT(S): 5000 INJECTION INTRAVENOUS; SUBCUTANEOUS at 05:38

## 2020-12-10 RX ADMIN — TIZANIDINE 2 MILLIGRAM(S): 4 TABLET ORAL at 21:30

## 2020-12-10 RX ADMIN — GABAPENTIN 100 MILLIGRAM(S): 400 CAPSULE ORAL at 19:01

## 2020-12-10 RX ADMIN — HEPARIN SODIUM 5000 UNIT(S): 5000 INJECTION INTRAVENOUS; SUBCUTANEOUS at 17:51

## 2020-12-10 RX ADMIN — Medication 1 SPRAY(S): at 17:51

## 2020-12-10 RX ADMIN — TIZANIDINE 2 MILLIGRAM(S): 4 TABLET ORAL at 17:51

## 2020-12-10 NOTE — CONSULT NOTE ADULT - ASSESSMENT
60 year old male with history of HTN, LALA,  MI with HERNAN x 2 to distal RCA, renal cell carcinoma with metastasis to lung s/p left nephrectomy and left upper lung lobectomy/wedge resection admitted with syncope    Syncope:  HTN  LALA  CAD: S/P MI:  RENAL CELL CA: S/P RUL WEDGE RESECTION  SUPRACLAVICULAR LYMPHADENOPATHY / MEDIASTINA LYMPHADENOPATHY: NEW:   RENAL CELL CA    12/10  Syncope: WORKUP PER PRIMARY TEAM   COVID 19 +: he i snot sob : no symptomatic check d d leni: crp and ferritin: he is on room air at this time:   HTN: controlled  LALA: outpt follow up with primary team   CAD: S/P MI:  RENAL CELL CA: S/P RUL WEDGE RESECTION  SUPRACLAVICULAR LYMPHADENOPATHY / MEDIASTINA LYMPHADENOPATHY: NEW: this is important as he has had renal cell ca ? recurrence: need to biopsyy on this admission ??  RENAL CELL CA  dw team

## 2020-12-10 NOTE — PROGRESS NOTE ADULT - SUBJECTIVE AND OBJECTIVE BOX
CARDIOLOGY FOLLOW UP - Dr. Toussaint    CC: chart review, no events noted overnight       PHYSICAL EXAM:  T(C): 36.6 (12-10-20 @ 05:36), Max: 36.7 (12-09-20 @ 12:55)  HR: 74 (12-10-20 @ 05:36) (68 - 88)  BP: 138/94 (12-10-20 @ 05:36) (129/89 - 138/94)  RR: 17 (12-10-20 @ 05:36) (16 - 20)  SpO2: 96% (12-10-20 @ 05:36) (95% - 99%)  Wt(kg): --  I&O's Summary      Home Medications:  Allegra 180 mg oral tablet: 1 tab(s) orally once a day (09 Dec 2020 15:12)  allopurinol 100 mg oral tablet: 1 tab(s) orally once a day (09 Dec 2020 15:19)  amLODIPine 10 mg oral tablet: 1 tab(s) orally once a day (09 Dec 2020 15:19)  Aspirin Enteric Coated 81 mg oral delayed release tablet: 1 tab(s) orally once a day (09 Dec 2020 15:19)  atorvastatin 80 mg oral tablet: 1 tab(s) orally once a day (09 Dec 2020 15:19)  Azithromycin 5 Day Dose Pack 250 mg oral tablet: Take orally as directed for 5 days  Per pharmacy, 5-day supply dispensed 11/18/20 (09 Dec 2020 15:19)  benzonatate 100 mg oral capsule: 1 cap(s) orally 3 times a day, As Needed  Per pharmacy, 7-day supply dispensed 11/20/20 (09 Dec 2020 15:19)  Brilinta (ticagrelor) 90 mg oral tablet: 1 tab(s) orally 2 times a day (09 Dec 2020 15:19)  colchicine 0.6 mg oral tablet: 1 tab(s) orally once a day (09 Dec 2020 15:19)  levothyroxine 150 mcg (0.15 mg) oral tablet: 1 tab(s) orally once a day (09 Dec 2020 15:19)  losartan 25 mg oral tablet: 1 tab(s) orally once a day (09 Dec 2020 15:19)  Metoprolol Succinate ER 25 mg oral tablet, extended release: 1 tab(s) orally once a day (09 Dec 2020 15:19)  ProAir RespiClick 90 mcg/inh inhalation powder: 2 puff(s) inhaled 3 times a day, As Needed (09 Dec 2020 15:19)  Vitamin D3 400 intl units oral capsule: 1 cap(s) orally once a day (09 Dec 2020 15:12)  Votrient 200 mg oral tablet: 3 tab(s) orally once a day (09 Dec 2020 15:19)      MEDICATIONS  (STANDING):  allopurinol 100 milliGRAM(s) Oral daily  amLODIPine   Tablet 10 milliGRAM(s) Oral daily  aspirin enteric coated 81 milliGRAM(s) Oral daily  atorvastatin 80 milliGRAM(s) Oral at bedtime  colchicine 0.6 milliGRAM(s) Oral daily  fluticasone propionate 50 MICROgram(s)/spray Nasal Spray 1 Spray(s) Both Nostrils two times a day  heparin   Injectable 5000 Unit(s) SubCutaneous every 12 hours  levothyroxine 150 MICROGram(s) Oral daily  metoprolol succinate ER 25 milliGRAM(s) Oral daily  pazopanib 600 milliGRAM(s) Oral daily  sodium chloride 0.9%. 1000 milliLiter(s) (50 mL/Hr) IV Continuous <Continuous>  ticagrelor 90 milliGRAM(s) Oral every 12 hours      TELEMETRY: SR 80s, PVCs 	    ECG:  	  RADIOLOGY:   CT Chest No Cont (12.09.20 @ 16:00)   FINDINGS:  LUNGS AND AIRWAYS: Patent central airways.  Status post right upper lobectomy and partial right lower lobe resection.  PLEURA: No pleural effusion.  MEDIASTINUM AND DANETTE: Multiple enlarged mediastinal lymph nodes, for example a reference right paratracheal lymph node measures 1.7 x 1.5 cm (308-37).  VESSELS: Within normal limits.  HEART: Heart size is normal. Trace pericardial fluid. Atherosclerotic changes of the coronary arteries. Coronary artery stents.  CHEST WALL AND LOWER NECK: An enlarged left supraclavicular lymph node measures2.1 x 1.9 cm (308-12).  VISUALIZED UPPER ABDOMEN: Small hiatal hernia.  BONES: Degenerative changes. Partial right posterior sixth rib resection.    IMPRESSION:  No pneumonia.  New left supraclavicular and mediastinal lymphadenopathy since 2015.  Post right upper lobectomy and partial right lower lobe resection with associated volume loss and architectural distortion.    CT Lumbar Spine No Cont (12.09.20 @ 14:48)   FINDINGS:  No acute fracture or dislocation identified. No aggressive osseous lesionsare identified. There is sacralization of the L5 level with pseudoarticulation of the L5 lateral masses.  Multilevel degenerative changes are noted, including severe bilateral facet joint arthrosis from L3 through L5.  The paraspinal musculature is unremarkable. There is no prevertebral swelling.  Partially visualized postsurgical changes involving the right sixth rib. Surgical clips noted in the right lower lobe of the lung.  1.7 x 1.3 cm paratracheal lesion noted in the upper mediastinum (5-37), new since the 1/6/2015 CT.  Patient is status post left nephrectomy. Soft tissue measuring up to 2.4 cm lateral to the nephrectomy clips, corresponding with the pancreatic tail on CT from 1/6/2015.    IMPRESSION:  -No acute fracture or dislocation.  -Right paratracheal suspected pathologic lymph node, new since 1/6/2015 CT. Recommend comparison with intervening recent CT versus performing CT of the chest for further evaluation        DIAGNOSTIC TESTING:  [ ] Echocardiogram:  [ ]  Catheterization:  [ ] Stress Test:    OTHER: 	    LABS:	 	    Troponin T, High Sensitivity Result: 25 ng/L (12-09 @ 14:13)  Troponin T, High Sensitivity Result: 27 ng/L (12-09 @ 12:31)                          17.4   5.46  )-----------( 117      ( 09 Dec 2020 12:31 )             53.8     12-09    135  |  98  |  27<H>  ----------------------------<  143<H>  4.7   |  23  |  2.01<H>    Ca    9.2      09 Dec 2020 12:31    TPro  7.8  /  Alb  3.7  /  TBili  1.1  /  DBili  x   /  AST  44<H>  /  ALT  41  /  AlkPhos  96  12-09    PT/INR - ( 09 Dec 2020 12:31 )   PT: 12.8 sec;   INR: 1.12 ratio         PTT - ( 09 Dec 2020 12:31 )  PTT:30.2 sec

## 2020-12-10 NOTE — CONSULT NOTE ADULT - SUBJECTIVE AND OBJECTIVE BOX
Patient is a 60y old  Male who presents with a chief complaint of syncope (10 Dec 2020 09:19)      HPI:  60 year old male with history of HTN, LALA,  MI with HERNAN x 2 to distal RCA, renal cell carcinoma with metastasis to lung s/p left nephrectomy and left upper lung lobectomy/wedge resection, presented with postnasal drip and cough x one week, accompanied with nausea and went to throw up in kitchen sink and states he lost consciousness with new onset severe back pain and spasms. The patient also had incontinence of feces. He does not recall how he ended on floor. Patient denies any chest pain, dyspnea, palpitations, edema, exertional symptoms, abdominal pain, fever, chills,  or rash. (09 Dec 2020 14:11)    he was found to be covid positive and hence pulm called:  Above history noted:  he says he was tested for void many times before it and was negative: but this time it came back positive:  he has no underlying lung disease:  He has no symptoms related to pulm at this t chance      ?FOLLOWING PRESENT  [x ] Hx of PE/DVT, [ x] Hx COPD, [x ] Hx of Asthma, [y ] Hx of Hospitalization, [c ]  Hx of BiPAP/CPAP use, [c ] Hx of LALA    Allergies    No Known Allergies    Intolerances        PAST MEDICAL & SURGICAL HISTORY:  HTN (hypertension)    LALA (obstructive sleep apnea)    Obesity    Diverticulitis    Incisional hernia  July 2013 &amp; 2010    H/O unilateral nephrectomy  left - 11/2014 ([facility])    History of lobectomy of lung  Left upper lobe and ?wedges (per pt) - 03/2016 at [facility]    Incisional hernia  abdominal surgery approximately 2010 &amp; 2013    Diverticulitis  2010 surgery for diverticulitis        FAMILY HISTORY:  Family history of leukemia  father    Family history of emphysema  mother (smoker)    Family history of diabetes mellitus (Aunt, Uncle)  maternal aunts and uncles    Family history of hypertension  father        Social History: [c  ] TOBACCO                  [ c ] ETOH                                 [c  ] IVDA/DRUGS    REVIEW OF SYSTEMS      General:	c    Skin/Breast:c  	  Ophthalmologic:x  	  ENMT:	x    Respiratory and Thorax: no cough , no sob   	  Cardiovascular:	x    Gastrointestinal:	x    Genitourinary:	x    Musculoskeletal:	x    Neurological:	syncope    Psychiatric:	x    Hematology/Lymphatics:	x    Endocrine:	x    Allergic/Immunologic:	x    MEDICATIONS  (STANDING):  allopurinol 100 milliGRAM(s) Oral daily  amLODIPine   Tablet 10 milliGRAM(s) Oral daily  aspirin enteric coated 81 milliGRAM(s) Oral daily  atorvastatin 80 milliGRAM(s) Oral at bedtime  colchicine 0.6 milliGRAM(s) Oral daily  fluticasone propionate 50 MICROgram(s)/spray Nasal Spray 1 Spray(s) Both Nostrils two times a day  heparin   Injectable 5000 Unit(s) SubCutaneous every 12 hours  levothyroxine 150 MICROGram(s) Oral daily  metoprolol succinate ER 25 milliGRAM(s) Oral daily  pazopanib 600 milliGRAM(s) Oral daily  sodium chloride 0.9%. 1000 milliLiter(s) (50 mL/Hr) IV Continuous <Continuous>  ticagrelor 90 milliGRAM(s) Oral every 12 hours    MEDICATIONS  (PRN):  ALBUTerol    90 MICROgram(s) HFA Inhaler 2 Puff(s) Inhalation every 6 hours PRN Shortness of Breath and/or Wheezing  benzonatate 100 milliGRAM(s) Oral three times a day PRN Cough  diazepam    Tablet 5 milliGRAM(s) Oral every 8 hours PRN back spasm  HYDROmorphone  Injectable 1 milliGRAM(s) IV Push every 4 hours PRN Severe Pain (7 - 10)  loratadine 10 milliGRAM(s) Oral daily PRN allergies  oxycodone    5 mG/acetaminophen 325 mG 2 Tablet(s) Oral every 4 hours PRN Moderate Pain (4 - 6)       Vital Signs Last 24 Hrs  T(C): 36.6 (10 Dec 2020 05:36), Max: 38.1 (09 Dec 2020 11:18)  T(F): 97.8 (10 Dec 2020 05:36), Max: 100.5 (09 Dec 2020 11:18)  HR: 74 (10 Dec 2020 05:36) (68 - 97)  BP: 138/94 (10 Dec 2020 05:36) (129/89 - 147/95)  BP(mean): 100 (09 Dec 2020 17:50) (100 - 101)  RR: 17 (10 Dec 2020 05:36) (16 - 20)  SpO2: 96% (10 Dec 2020 05:36) (94% - 99%)        I&O's Summary      Physical Exam:   GENERAL: NAD, well-groomed, well-developed  HEENT: SKY/   Atraumatic, Normocephalic  ENMT: No tonsillar erythema, exudates, or enlargement; Moist mucous membranes, Good dentition, No lesions  NECK: Supple, No JVD, Normal thyroid  CHEST/LUNG: Clear to auscultation bilaterally  CVS: Regular rate and rhythm; No murmurs, rubs, or gallops  GI: : Soft, Nontender, Nondistended; Bowel sounds present  NERVOUS SYSTEM:  Alert & Oriented X3  EXTREMITIES: -marty  LYMPH: No lymphadenopathy noted  SKIN: No rashes or lesions  ENDOCRINOLOGY: No Thyromegaly  PSYCH: Appropriate    Labs:  -0.9<49<4>>41<<7.325>>-0.9<<3><<4><<5<<419>>, -0.5<46<4>>30<<7.345>>-0.5<<3><<4><<5<<309>>                            17.4   5.46  )-----------( 117      ( 09 Dec 2020 12:31 )             53.8     12-09    135  |  98  |  27<H>  ----------------------------<  143<H>  4.7   |  23  |  2.01<H>    Ca    9.2      09 Dec 2020 12:31    TPro  7.8  /  Alb  3.7  /  TBili  1.1  /  DBili  x   /  AST  44<H>  /  ALT  41  /  AlkPhos  96  12-09    CAPILLARY BLOOD GLUCOSE        LIVER FUNCTIONS - ( 09 Dec 2020 12:31 )  Alb: 3.7 g/dL / Pro: 7.8 g/dL / ALK PHOS: 96 U/L / ALT: 41 U/L / AST: 44 U/L / GGT: x           PT/INR - ( 09 Dec 2020 12:31 )   PT: 12.8 sec;   INR: 1.12 ratio         PTT - ( 09 Dec 2020 12:31 )  PTT:30.2 sec    D DImer      Studies  Chest X-RAY  CT SCAN Chest   CT Abdomen  Venous Dopplers: LE:   Others      rad< from: CT Chest No Cont (12.09.20 @ 16:00) >  LUNGS AND AIRWAYS: Patent central airways.  Status post right upper lobectomy and partial right lower lobe resection.    PLEURA: No pleural effusion.    MEDIASTINUM AND DANETTE: Multiple enlarged mediastinal lymph nodes, for example a reference right paratracheal lymph node measures 1.7 x 1.5 cm (308-37).    VESSELS: Within normal limits.    HEART: Heart size is normal. Trace pericardial fluid. Atherosclerotic changes of the coronary arteries. Coronary artery stents.    CHEST WALL AND LOWER NECK: An enlarged left supraclavicular lymph node measures2.1 x 1.9 cm (308-12).    VISUALIZED UPPER ABDOMEN: Small hiatal hernia.    BONES: Degenerative changes. Partial right posterior sixth rib resection.    IMPRESSION:  No pneumonia.    New left supraclavicular and mediastinal lymphadenopathy since 2015.    Post right upper lobectomy and partial right lower lobe resection with associated volume loss and architectural distortion.            EMA SCHROEDER MD; Resident Radiology  This document has been electronically signed.  LILLIAM CAGLE MD; Attending Radiologist  This document has been electronically signed. Dec  9 2020  4:56PM    < end of copied text >

## 2020-12-10 NOTE — CONSULT NOTE ADULT - SUBJECTIVE AND OBJECTIVE BOX
[facility], [place]([facility]), [facility]        Patient is a 60y old  Male who presents with a chief complaint of syncope (10 Dec 2020 11:26)    Excerpt from H&P,"  60 year old male with history of HTN, LALA,  MI with HERNAN x 2 to distal RCA, renal cell carcinoma with metastasis to lung s/p left nephrectomy and left upper lung lobectomy/wedge resection, presented with postnasal drip and cough x one week, accompanied with nausea and went to throw up in kitchen sink and states he lost consciousness with new onset severe back pain and spasms. The patient also had incontinence of feces. He does not recall how he ended on floor. Patient denies any chest pain, dyspnea, palpitations, edema, exertional symptoms, abdominal pain, fever, chills,  or rash.   1 episode of incontinence associated with the syncope   otherwise no incontinence since   also denies any saddle anesthesia            *****PAST MEDICAL / Surgical  HISTORY:  PAST MEDICAL & SURGICAL HISTORY:  HTN (hypertension)    LALA (obstructive sleep apnea)    Obesity    Diverticulitis    Incisional hernia  2013 &amp;     H/O unilateral nephrectomy  left - 2014 ([facility])    History of lobectomy of lung  Left upper lobe and ?wedges (per pt) - 2016 at [facility]    Incisional hernia  abdominal surgery approximately  &amp;     Diverticulitis   surgery for diverticulitis             *****FAMILY HISTORY:  FAMILY HISTORY:  Family history of leukemia  father    Family history of emphysema  mother (smoker)    Family history of diabetes mellitus (Aunt, Uncle)  maternal aunts and uncles    Family history of hypertension  father             *****SOCIAL HISTORY:  Alcohol: None  Smoking: None         *****ALLERGIES:   Allergies    No Known Allergies    Intolerances             *****MEDICATIONS: current medication reviewed and documented.   MEDICATIONS  (STANDING):  allopurinol 100 milliGRAM(s) Oral daily  amLODIPine   Tablet 10 milliGRAM(s) Oral daily  aspirin enteric coated 81 milliGRAM(s) Oral daily  atorvastatin 80 milliGRAM(s) Oral at bedtime  colchicine 0.6 milliGRAM(s) Oral daily  fluticasone propionate 50 MICROgram(s)/spray Nasal Spray 1 Spray(s) Both Nostrils two times a day  heparin   Injectable 5000 Unit(s) SubCutaneous every 12 hours  levothyroxine 150 MICROGram(s) Oral daily  metoprolol succinate ER 25 milliGRAM(s) Oral daily  pazopanib 600 milliGRAM(s) Oral daily  sodium chloride 0.9%. 1000 milliLiter(s) (50 mL/Hr) IV Continuous <Continuous>  ticagrelor 90 milliGRAM(s) Oral every 12 hours    MEDICATIONS  (PRN):  ALBUTerol    90 MICROgram(s) HFA Inhaler 2 Puff(s) Inhalation every 6 hours PRN Shortness of Breath and/or Wheezing  benzonatate 100 milliGRAM(s) Oral three times a day PRN Cough  diazepam    Tablet 5 milliGRAM(s) Oral every 8 hours PRN back spasm  HYDROmorphone  Injectable 1 milliGRAM(s) IV Push every 4 hours PRN Severe Pain (7 - 10)  loratadine 10 milliGRAM(s) Oral daily PRN allergies  oxycodone    5 mG/acetaminophen 325 mG 2 Tablet(s) Oral every 4 hours PRN Moderate Pain (4 - 6)           *****REVIEW OF SYSTEM:  GEN: no fever, no chills, no pain  RESP: no SOB, no cough, no sputum  CVS: no chest pain, no palpitations, no edema  GI: no abdominal pain, no nausea, no vomiting, no constipation, no diarrhea  : no dysurea, no frequency, no hematurea  Neuro: no headache, no dizziness  PSYCH: no anxiety, no depression  Derm : no itching, no rash         *****VITAL SIGNS:  T(C): 36.6 (12-10-20 @ 05:36), Max: 36.7 (20 @ 17:50)  HR: 74 (12-10-20 @ 05:36) (68 - 88)  BP: 138/94 (12-10-20 @ 05:36) (129/89 - 138/94)  RR: 17 (12-10-20 @ 05:36) (16 - 20)  SpO2: 96% (12-10-20 @ 05:36) (95% - 98%)  Wt(kg): --           *****PHYSICAL EXAM:   Alert oriented x 3   Attention comprehension are fair. Able to name, repeat, read without any difficulty.   Able to follow 3 step commands.     EOMI fundi not visualized,  VFF to confrontration  No facial asymmetry   Tongue is midline   Palate elevates symmetrically   Moving all 4 ext symmetrically no pronator drift   Reflexes are symmetric throughout   sensation is grossly symmetric  Gait : not assessed.  B/L down going toes               *****LAB AND IMAGIN.4   5.46  )-----------( 117      ( 09 Dec 2020 12:31 )             53.8                   135  |  98  |  27<H>  ----------------------------<  143<H>  4.7   |  23  |  2.01<H>    Ca    9.2      09 Dec 2020 12:31    TPro  7.8  /  Alb  3.7  /  TBili  1.1  /  DBili  x   /  AST  44<H>  /  ALT  41  /  AlkPhos  96      PT/INR - ( 09 Dec 2020 12:31 )   PT: 12.8 sec;   INR: 1.12 ratio         PTT - ( 09 Dec 2020 12:31 )  PTT:30.2 sec            < from: CT Lumbar Spine No Cont (20 @ 14:48) >   No acute fracture or dislocation identified. No aggressive osseous lesionsare identified. There is sacralization of the L5 level with pseudoarticulation of the L5 lateral masses.    Multilevel degenerative changes are noted, including severe bilateral facet joint arthrosis from L3 through L5.    The paraspinal musculature is unremarkable. There is no prevertebral swelling.    Partially visualized postsurgical changes involving the right sixth rib. Surgical clips noted in the right lower lobe of the lung.    1.7 x 1.3 cm paratracheal lesion noted in the upper mediastinum (5-37), new since the 2015 CT.    Patient is status post left nephrectomy. Soft tissue measuring up to 2.4 cm lateral to the nephrectomy clips, corresponding with the pancreatic tail on CT from 2015.    IMPRESSION:    -No acute fracture or dislocation.    -Right paratracheal suspected pathologic lymph node, new since 2015 CT. Recommend comparison with intervening recent CT versus performing CT of the chest for further evaluation  < from: CT Thoracic Spine No Cont (20 @ 14:48) >     No acute fracture or dislocation identified. No aggressive osseous lesionsare identified. There is sacralization of the L5 level with pseudoarticulation of the L5 lateral masses.    Multilevel degenerative changes are noted, including severe bilateral facet joint arthrosis from L3 through L5.    The paraspinal musculature is unremarkable. There is no prevertebral swelling.    Partially visualized postsurgical changes involving the right sixth rib. Surgical clips noted in the right lower lobe of the lung.    1.7 x 1.3 cm paratracheal lesion noted in the upper mediastinum (5-37), new since the 2015 CT.    Patient is status post left nephrectomy. Soft tissue measuring up to 2.4 cm lateral to the nephrectomy clips, corresponding with the pancreatic tail on CT from 2015.    IMPRESSION:    -No acute fracture or dislocation.    -Right paratracheal suspected pathologic lymph node, new since 2015 CT. Recommend comparison with intervening recent CT versus performing CT of the chest for further evaluation      < end of copied text >          < end of copied text >                  [All pertinent recent Imaging reports reviewed]         *****A S S E S S M E N T   A N D   P L A N :        60 year old male with history of HTN, LALA,  MI with HERNAN x 2 to distal RCA, renal cell carcinoma with metastasis to lung s/p left nephrectomy and left upper lung lobectomy/wedge resection, presented with postnasal drip and cough x one week, accompanied with nausea and went to throw up in kitchen sink and states he lost consciousness with new onset severe back pain and spasms. The patient also had incontinence of feces. He does not recall how he ended on floor. Patient denies any chest pain, dyspnea, palpitations, edema, exertional symptoms, abdominal pain, fever, chills,  or rash.    Problem/Recommendations 1: Back pain secondary to fall   ct without any fracture   mri  thoracic/lumbar spine with and without contrast when stable   control pain with gabapentin 100 bid   tizanidine 2mg tid   warm compresses to back as needed           Problem/Recommendations 2: syncope   with  incontinence likely related to vasovagal vs orthostatic due to volume contraction   cardiac w/u underway   cr increased from baseline   no clear post ictal period   will continue to monitor closely       Problem/Recommendations 3: paratracheal lesion of unclear etiology        ___________________________  Will follow with you.  Thank you,  Zoraida La MD  Diplomate of the American Board of Neurology and Psychiatry.  Diplomate of the American Board of Vascular Neurology.   [facility]   Ph: [phone]    Differential diagnosis and plan of care discussed with patient after the evaluation.   Advanced care planning options discussed.   Pain assessed and judicious use of narcotics when appropriate was discussed.  Importance of Fall prevention discussed.  Counseling on Smoking and Alcohol cessation was offered when appropriate.  Counseling on Diet, exercise, and medication compliance was done.   83 minutes spent on the total encounter;  more than 50 % of the visit was spent on counseling  and or coordinating care by the attending physician.    Thank you for allowing me to participate in the care of this jaret patient. Please do not hesitate to call me if you have any questions.     This and subsequent notes were partially created using voice recognition software and will  inherently be subject to errors including those of syntax and sound alike substitutions which may escape proofreading. In such instances original meaning may be extrapolated by contextual derivation.

## 2020-12-10 NOTE — PROGRESS NOTE ADULT - SUBJECTIVE AND OBJECTIVE BOX
Patient is a 60y old  Male who presents with a chief complaint of syncope (10 Dec 2020 11:26)      SUBJECTIVE / OVERNIGHT EVENTS: overnight events noted    ROS:  Resp: No cough no sputum production  CVS: No chest pain no palpitations no orthopnea  GI: occasional nausea, no vomiting  : no dysuria, no hematuria  Neuro: no weakness no paresthesias  Heme: No petechiae no easy bruising  Msk: No joint pain no swelling  + back spasms   Skin: No rash no itching        MEDICATIONS  (STANDING):  allopurinol 100 milliGRAM(s) Oral daily  amLODIPine   Tablet 10 milliGRAM(s) Oral daily  aspirin enteric coated 81 milliGRAM(s) Oral daily  atorvastatin 80 milliGRAM(s) Oral at bedtime  colchicine 0.6 milliGRAM(s) Oral daily  fluticasone propionate 50 MICROgram(s)/spray Nasal Spray 1 Spray(s) Both Nostrils two times a day  heparin   Injectable 5000 Unit(s) SubCutaneous every 12 hours  levothyroxine 150 MICROGram(s) Oral daily  metoprolol succinate ER 25 milliGRAM(s) Oral daily  sodium chloride 0.9%. 1000 milliLiter(s) (50 mL/Hr) IV Continuous <Continuous>  ticagrelor 90 milliGRAM(s) Oral every 12 hours  tiZANidine 2 milliGRAM(s) Oral three times a day    MEDICATIONS  (PRN):  ALBUTerol    90 MICROgram(s) HFA Inhaler 2 Puff(s) Inhalation every 6 hours PRN Shortness of Breath and/or Wheezing  benzonatate 100 milliGRAM(s) Oral three times a day PRN Cough  diazepam    Tablet 5 milliGRAM(s) Oral every 8 hours PRN back spasm  HYDROmorphone  Injectable 1 milliGRAM(s) IV Push every 4 hours PRN Severe Pain (7 - 10)  loratadine 10 milliGRAM(s) Oral daily PRN allergies  oxycodone    5 mG/acetaminophen 325 mG 2 Tablet(s) Oral every 4 hours PRN Moderate Pain (4 - 6)        CAPILLARY BLOOD GLUCOSE        I&O's Summary      Vital Signs Last 24 Hrs  T(C): 36.3 (10 Dec 2020 13:54), Max: 36.7 (09 Dec 2020 17:50)  T(F): 97.4 (10 Dec 2020 13:54), Max: 98 (09 Dec 2020 17:50)  HR: 76 (10 Dec 2020 13:54) (68 - 88)  BP: 123/87 (10 Dec 2020 13:54) (123/87 - 138/94)  BP(mean): 100 (09 Dec 2020 17:50) (100 - 100)  RR: 17 (10 Dec 2020 13:54) (16 - 20)  SpO2: 99% (10 Dec 2020 13:54) (95% - 99%)    PHYSICAL EXAM:   in no apparent distress  HEENT: SKY EOMI  Neck: Supple,  Lungs: no wheeze, no crackles  CVS: S1 S2 no M/R/G  Abdomen: no tenderness, BS present  Neuro: AO x 3 no focal weakness, no sensory abnormalities  power 5/5 all 4 extremities  no sensory loss  bilateral paraspinal spasm noted  Psych: appropriate affect  Skin: warm, dry  Ext: no edema  Back: + paraspinal spasm    LABS:                        17.4   5.46  )-----------( 117      ( 09 Dec 2020 12:31 )             53.8     12-09    135  |  98  |  27<H>  ----------------------------<  143<H>  4.7   |  23  |  2.01<H>    Ca    9.2      09 Dec 2020 12:31    TPro  7.8  /  Alb  3.7  /  TBili  1.1  /  DBili  x   /  AST  44<H>  /  ALT  41  /  AlkPhos  96  12-09    PT/INR - ( 09 Dec 2020 12:31 )   PT: 12.8 sec;   INR: 1.12 ratio         PTT - ( 09 Dec 2020 12:31 )  PTT:30.2 sec            All consultant(s) notes reviewed and care discussed with other providers        Contact Number, Dr Tejada [phone]

## 2020-12-10 NOTE — PROGRESS NOTE ADULT - ASSESSMENT
60 year old male with history of HTN, LALA,  MI with HERNAN x 2 to distal RCA, renal cell carcinoma with metastasis to lung s/p left nephrectomy and left upper lung lobectomy/wedge resection, presented with postnasal drip and cough x one week with covid infection, fever, ROSEANN      (1)Renal - CKD3b - proteinuric, based on urine studies from 2018 - in part due to solitary functioning kidney. 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 This underlying process has likely resulted in a component of baseline azotemia.    (2)ROSEANN - likely a superimposed component of prerenal azotemia; he is at particular risk for it given his biventricular CHF. Is he dry at present? On Losartan at home - I would hold it for now.    (3)CV - syncope versus seizure    RECOMMEND:  (1) Can forgo further IVF for now, as he is s/p 2L in the ER. No diuretics for now  (4)+  orthostatics this am   (5)BMP daily  (6)Meds for GFR 30-40ml/min (present dosing is acceptable)  (7)No objection to use of Votrient as taken at home  (8)No NSAIDs for back pain      Sayed [employer]   [phone] 60 year old male with history of HTN, LALA,  MI with HERNAN x 2 to distal RCA, renal cell carcinoma with metastasis to lung s/p left nephrectomy and left upper lung lobectomy/wedge resection, presented with postnasal drip and cough x one week with covid infection, fever, ROSEANN  New left supraclavicular and mediastinal lymphadenopathy  Orthostatic hypotension   CHDF-Compensated     (1)Renal - CKD3b - proteinuric, based on urine studies from 2018 - in part due to solitary functioning kidney. 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 This underlying process has likely resulted in a component of baseline azotemia.  Baseline creatinine around 1.4     (2)ROSEANN - likely a superimposed component of prerenal azotemia;  On Losartan at home - I would hold it for now.    (3)CV - syncope versus seizure    RECOMMEND:  (1) NS at 50cc/hr for 10 hours.  He got 2 Liters in ER BUT HE IS STILL ORTHOSTATIC.  Not on Oxygen    (2)BMP daily  (3)Meds for GFR 30-40ml/min (present dosing is acceptable)  (4))No NSAIDs for back pain      Once healed from the COVID the mediastinal adenopathy needs to be addressed     Sayed [facility]   [employer] [facility]   [phone]

## 2020-12-10 NOTE — PROGRESS NOTE ADULT - ASSESSMENT
echo 5/28/20: EF 34%,  grossly moderate to severe segmental left ventricular systolic function Hypokinesis of the inferior wall, inferolateral wall, and inferoseptum  Cath 5/28/20: HERNAN x 2 to distal RCA    a/p  Patient is a 60 year old man with history of HTN, LALA,  MI with HERNAN x 2 to distal RCA, renal cell carcinoma with metastasis to lung s/p left nephrectomy and left upper lung lobectomy/wedge resection (?), presenting with syncopal episode    1. ?Syncope  -likely vasovagal  vs ?seizure with loss of bowel function vs +COVID infection   -CT head noted  -EKG without ischemic changes  -borderline orthostatics noted- c/w IVF per renal     2. CAD  -s/p HERNAN x 2 to distal RCA in May 2020  -CV stable  -TTE 5/28 with EF 34%, moderate to severe segmental LV systolic dysfunction; Hypokinesis of the inferior wall, inferolateral wall, and inferoseptum  -resume outpatient meds: ASA, Brilinta 90 mg bid,  Atorvastatin 80 mg daily, Metoprolol tarte, 12.5 BID  -continue to hold acei for ROSEANN    3. Ischemic Cardiomyopathy  -stable, no clinical hf, no diuretic need, on room air  -continue bb   -TTE 5/28 with EF 34%, moderate to severe segmental LV systolic dysfunction; Hypokinesis of the inferior wall, inferolateral wall, and inferoseptum    4. CKD  -trend creat    5. Back pain   -CT spine noted  -med f/u     dvt ppx    echo 5/28/20: EF 34%,  grossly moderate to severe segmental left ventricular systolic function Hypokinesis of the inferior wall, inferolateral wall, and inferoseptum  Cath 5/28/20: HERNAN x 2 to distal RCA    a/p  Patient is a 60 year old man with history of HTN, LALA,  MI with HERNAN x 2 to distal RCA, renal cell carcinoma with metastasis to lung s/p left nephrectomy and left upper lung lobectomy/wedge resection (?), presenting with syncopal episode    1. ?Syncope  -likely vasovagal  vs ?seizure with loss of bowel function vs +COVID infection   -no prodromal cardiac symptoms reported  -CT head noted  -EKG without ischemic changes  -borderline orthostatics noted- c/w IVF per renal     2. CAD  -s/p HERNAN x 2 to distal RCA in May 2020  -CV stable  -TTE 5/28 with EF 34%, moderate to severe segmental LV systolic dysfunction; Hypokinesis of the inferior wall, inferolateral wall, and inferoseptum  -resume outpatient meds: ASA, Brilinta 90 mg bid,  Atorvastatin 80 mg daily, Metoprolol tarte, 12.5 BID  -continue to hold acei for ROSEANN    3. Ischemic Cardiomyopathy  -stable, no clinical hf, no diuretic need, on room air  -continue bb   -TTE 5/28 with EF 34%, moderate to severe segmental LV systolic dysfunction; Hypokinesis of the inferior wall, inferolateral wall, and inferoseptum    4. CKD  -trend creat    5. Back pain   -CT spine noted  -med f/u     dvt ppx

## 2020-12-10 NOTE — PROGRESS NOTE ADULT - SUBJECTIVE AND OBJECTIVE BOX
NEPHROLOGY-[facility] [phone]        Patient seen and examined         MEDICATIONS  (STANDING):  allopurinol 100 milliGRAM(s) Oral daily  amLODIPine   Tablet 10 milliGRAM(s) Oral daily  aspirin enteric coated 81 milliGRAM(s) Oral daily  atorvastatin 80 milliGRAM(s) Oral at bedtime  colchicine 0.6 milliGRAM(s) Oral daily  fluticasone propionate 50 MICROgram(s)/spray Nasal Spray 1 Spray(s) Both Nostrils two times a day  heparin   Injectable 5000 Unit(s) SubCutaneous every 12 hours  levothyroxine 150 MICROGram(s) Oral daily  metoprolol succinate ER 25 milliGRAM(s) Oral daily  pazopanib 600 milliGRAM(s) Oral daily  ticagrelor 90 milliGRAM(s) Oral every 12 hours      VITAL:  T(C): , Max: 38.1 (12-09-20 @ 11:18)  T(F): , Max: 100.5 (12-09-20 @ 11:18)  HR: 74 (12-10-20 @ 05:36)  BP: 138/94 (12-10-20 @ 05:36)  BP(mean): 100 (12-09-20 @ 17:50)  RR: 17 (12-10-20 @ 05:36)  SpO2: 96% (12-10-20 @ 05:36)  Wt(kg): --    I and O's:    Height (cm): 167.6 (12-09 @ 10:16)  Weight (kg): 82.7 (12-09 @ 17:50)  BMI (kg/m2): 29.4 (12-09 @ 17:50)  BSA (m2): 1.92 (12-09 @ 17:50)    PHYSICAL EXAM:    Constitutional: NAD  Neck:  No JVD  Respiratory: CTAB/L  Cardiovascular: S1 and S2  Gastrointestinal: BS+, soft, NT/ND  Extremities: No peripheral edema  Neurological: A/O x 3, no focal deficits  Psychiatric: Normal mood, normal affect  : No Escalante  Skin: No rashes  Access: Not applicable    LABS:                        17.4   5.46  )-----------( 117      ( 09 Dec 2020 12:31 )             53.8     12-09    135  |  98  |  27<H>  ----------------------------<  143<H>  4.7   |  23  |  2.01<H>    Ca    9.2      09 Dec 2020 12:31    TPro  7.8  /  Alb  3.7  /  TBili  1.1  /  DBili  x   /  AST  44<H>  /  ALT  41  /  AlkPhos  96  12-09          Urine Studies:          RADIOLOGY & ADDITIONAL STUDIES:             NEPHROLOGY-[facility] [phone]        Patient seen and examined in bed.  He felt well but he did have nausea this am   Febrile again       MEDICATIONS  (STANDING):  allopurinol 100 milliGRAM(s) Oral daily  amLODIPine   Tablet 10 milliGRAM(s) Oral daily  aspirin enteric coated 81 milliGRAM(s) Oral daily  atorvastatin 80 milliGRAM(s) Oral at bedtime  colchicine 0.6 milliGRAM(s) Oral daily  fluticasone propionate 50 MICROgram(s)/spray Nasal Spray 1 Spray(s) Both Nostrils two times a day  heparin   Injectable 5000 Unit(s) SubCutaneous every 12 hours  levothyroxine 150 MICROGram(s) Oral daily  metoprolol succinate ER 25 milliGRAM(s) Oral daily  pazopanib 600 milliGRAM(s) Oral daily  ticagrelor 90 milliGRAM(s) Oral every 12 hours      VITAL:  T(C): , Max: 38.1 (12-09-20 @ 11:18)  T(F): , Max: 100.5 (12-09-20 @ 11:18)  HR: 74 (12-10-20 @ 05:36)  BP: 138/94 (12-10-20 @ 05:36)  BP(mean): 100 (12-09-20 @ 17:50)  RR: 17 (12-10-20 @ 05:36)  SpO2: 96% (12-10-20 @ 05:36)  Wt(kg): --    I and O's:    Height (cm): 167.6 (12-09 @ 10:16)  Weight (kg): 82.7 (12-09 @ 17:50)  BMI (kg/m2): 29.4 (12-09 @ 17:50)  BSA (m2): 1.92 (12-09 @ 17:50)    PHYSICAL EXAM:    Constitutional: NAD  Neck:  No JVD  Respiratory: CTAB/L  Cardiovascular: S1 and S2  Gastrointestinal: BS+, soft, NT/ND  Extremities: No peripheral edema  Neurological: A/O x 3, no focal deficits  Psychiatric: Normal mood, normal affect  : No Escalante  Skin: No rashes  Access: Not applicable    LABS:                        17.4   5.46  )-----------( 117      ( 09 Dec 2020 12:31 )             53.8     12-09    135  |  98  |  27<H>  ----------------------------<  143<H>  4.7   |  23  |  2.01<H>    Ca    9.2      09 Dec 2020 12:31    TPro  7.8  /  Alb  3.7  /  TBili  1.1  /  DBili  x   /  AST  44<H>  /  ALT  41  /  AlkPhos  96  12-09          Urine Studies:          RADIOLOGY & ADDITIONAL STUDIES:        < from: CT Chest No Cont (12.09.20 @ 16:00) >    EXAM:  CT CHEST        PROCEDURE DATE:  Dec  9 2020         INTERPRETATION:  CLINICAL INFORMATION: Fever. Evaluate for pneumonia. History of left renal cell carcinoma with metastasis to the lung status post upper lobe wedge resection and left nephrectomy.    COMPARISON: CT chest, abdomen and pelvis 8/26/2015.    PROCEDURE:  CT of the Chest was performed without intravenous contrast.  Sagittal and coronal reformats were performed.    FINDINGS:    LUNGS AND AIRWAYS: Patent central airways.  Status post right upper lobectomy and partial right lower lobe resection.    PLEURA: No pleural effusion.    MEDIASTINUM AND DANETTE: Multiple enlarged mediastinal lymph nodes, for example a reference right paratracheal lymph node measures 1.7 x 1.5 cm (308-37).    VESSELS: Within normal limits.    HEART: Heart size is normal. Trace pericardial fluid. Atherosclerotic changes of the coronary arteries. Coronary artery stents.    CHEST WALL AND LOWER NECK: An enlarged left supraclavicular lymph node measures2.1 x 1.9 cm (308-12).    VISUALIZED UPPER ABDOMEN: Small hiatal hernia.    BONES: Degenerative changes. Partial right posterior sixth rib resection.    IMPRESSION:  No pneumonia.    New left supraclavicular and mediastinal lymphadenopathy since 2015.    Post right upper lobectomy and partial right lower lobe resection with associated volume loss and architectural distortion.            EMA SCHROEDER MD; Resident Radiology  This document has been electronically signed.  LILLIAM CAGLE MD; Attending Radiologist  This document has been electronically signed. Dec  9 2020  4:56PM    < end of copied text >

## 2020-12-11 DIAGNOSIS — E03.9 HYPOTHYROIDISM, UNSPECIFIED: ICD-10-CM

## 2020-12-11 LAB
ADD ON TEST-SPECIMEN IN LAB: SIGNIFICANT CHANGE UP
ALBUMIN SERPL ELPH-MCNC: 3.2 G/DL — LOW (ref 3.3–5)
ALP SERPL-CCNC: 78 U/L — SIGNIFICANT CHANGE UP (ref 40–120)
ALT FLD-CCNC: 47 U/L — HIGH (ref 4–41)
ANION GAP SERPL CALC-SCNC: 9 MMOL/L — SIGNIFICANT CHANGE UP (ref 7–14)
AST SERPL-CCNC: 51 U/L — HIGH (ref 4–40)
BILIRUB SERPL-MCNC: 0.8 MG/DL — SIGNIFICANT CHANGE UP (ref 0.2–1.2)
BUN SERPL-MCNC: 28 MG/DL — HIGH (ref 7–23)
CALCIUM SERPL-MCNC: 8.1 MG/DL — LOW (ref 8.4–10.5)
CHLORIDE SERPL-SCNC: 104 MMOL/L — SIGNIFICANT CHANGE UP (ref 98–107)
CO2 SERPL-SCNC: 24 MMOL/L — SIGNIFICANT CHANGE UP (ref 22–31)
CREAT SERPL-MCNC: 1.55 MG/DL — HIGH (ref 0.5–1.3)
CRP SERPL-MCNC: 17.2 MG/L — HIGH
D DIMER BLD IA.RAPID-MCNC: 264 NG/ML DDU — HIGH
FERRITIN SERPL-MCNC: 1143 NG/ML — HIGH (ref 30–400)
GLUCOSE SERPL-MCNC: 84 MG/DL — SIGNIFICANT CHANGE UP (ref 70–99)
HCT VFR BLD CALC: 49.1 % — SIGNIFICANT CHANGE UP (ref 39–50)
HGB BLD-MCNC: 15.5 G/DL — SIGNIFICANT CHANGE UP (ref 13–17)
MCHC RBC-ENTMCNC: 27 PG — SIGNIFICANT CHANGE UP (ref 27–34)
MCHC RBC-ENTMCNC: 31.6 GM/DL — LOW (ref 32–36)
MCV RBC AUTO: 85.5 FL — SIGNIFICANT CHANGE UP (ref 80–100)
NRBC # BLD: 0 /100 WBCS — SIGNIFICANT CHANGE UP
NRBC # FLD: 0 K/UL — SIGNIFICANT CHANGE UP
PLATELET # BLD AUTO: 99 K/UL — LOW (ref 150–400)
POTASSIUM SERPL-MCNC: 4.3 MMOL/L — SIGNIFICANT CHANGE UP (ref 3.5–5.3)
POTASSIUM SERPL-SCNC: 4.3 MMOL/L — SIGNIFICANT CHANGE UP (ref 3.5–5.3)
PROT SERPL-MCNC: 6.9 G/DL — SIGNIFICANT CHANGE UP (ref 6–8.3)
RBC # BLD: 5.74 M/UL — SIGNIFICANT CHANGE UP (ref 4.2–5.8)
RBC # FLD: 16.6 % — HIGH (ref 10.3–14.5)
SODIUM SERPL-SCNC: 137 MMOL/L — SIGNIFICANT CHANGE UP (ref 135–145)
T4 FREE SERPL-MCNC: 1.2 NG/DL — SIGNIFICANT CHANGE UP (ref 0.9–1.8)
VIT B12 SERPL-MCNC: 855 PG/ML — SIGNIFICANT CHANGE UP (ref 200–900)
WBC # BLD: 4.63 K/UL — SIGNIFICANT CHANGE UP (ref 3.8–10.5)
WBC # FLD AUTO: 4.63 K/UL — SIGNIFICANT CHANGE UP (ref 3.8–10.5)

## 2020-12-11 PROCEDURE — 93306 TTE W/DOPPLER COMPLETE: CPT | Mod: 26

## 2020-12-11 PROCEDURE — 99232 SBSQ HOSP IP/OBS MODERATE 35: CPT

## 2020-12-11 RX ADMIN — HEPARIN SODIUM 5000 UNIT(S): 5000 INJECTION INTRAVENOUS; SUBCUTANEOUS at 05:12

## 2020-12-11 RX ADMIN — ATORVASTATIN CALCIUM 80 MILLIGRAM(S): 80 TABLET, FILM COATED ORAL at 22:54

## 2020-12-11 RX ADMIN — OXYCODONE AND ACETAMINOPHEN 2 TABLET(S): 5; 325 TABLET ORAL at 17:12

## 2020-12-11 RX ADMIN — HEPARIN SODIUM 5000 UNIT(S): 5000 INJECTION INTRAVENOUS; SUBCUTANEOUS at 17:15

## 2020-12-11 RX ADMIN — TIZANIDINE 2 MILLIGRAM(S): 4 TABLET ORAL at 22:54

## 2020-12-11 RX ADMIN — OXYCODONE AND ACETAMINOPHEN 2 TABLET(S): 5; 325 TABLET ORAL at 01:50

## 2020-12-11 RX ADMIN — GABAPENTIN 100 MILLIGRAM(S): 400 CAPSULE ORAL at 17:14

## 2020-12-11 RX ADMIN — OXYCODONE AND ACETAMINOPHEN 2 TABLET(S): 5; 325 TABLET ORAL at 07:40

## 2020-12-11 RX ADMIN — Medication 81 MILLIGRAM(S): at 12:55

## 2020-12-11 RX ADMIN — Medication 100 MILLIGRAM(S): at 12:55

## 2020-12-11 RX ADMIN — Medication 1 SPRAY(S): at 05:12

## 2020-12-11 RX ADMIN — TICAGRELOR 90 MILLIGRAM(S): 90 TABLET ORAL at 05:13

## 2020-12-11 RX ADMIN — TICAGRELOR 90 MILLIGRAM(S): 90 TABLET ORAL at 17:15

## 2020-12-11 RX ADMIN — AMLODIPINE BESYLATE 10 MILLIGRAM(S): 2.5 TABLET ORAL at 05:13

## 2020-12-11 RX ADMIN — Medication 0.6 MILLIGRAM(S): at 12:56

## 2020-12-11 RX ADMIN — Medication 25 MILLIGRAM(S): at 05:13

## 2020-12-11 RX ADMIN — Medication 150 MICROGRAM(S): at 05:13

## 2020-12-11 RX ADMIN — OXYCODONE AND ACETAMINOPHEN 2 TABLET(S): 5; 325 TABLET ORAL at 08:11

## 2020-12-11 RX ADMIN — TIZANIDINE 2 MILLIGRAM(S): 4 TABLET ORAL at 14:35

## 2020-12-11 RX ADMIN — OXYCODONE AND ACETAMINOPHEN 2 TABLET(S): 5; 325 TABLET ORAL at 17:42

## 2020-12-11 RX ADMIN — PANTOPRAZOLE SODIUM 40 MILLIGRAM(S): 20 TABLET, DELAYED RELEASE ORAL at 05:17

## 2020-12-11 RX ADMIN — OXYCODONE AND ACETAMINOPHEN 2 TABLET(S): 5; 325 TABLET ORAL at 01:07

## 2020-12-11 RX ADMIN — TIZANIDINE 2 MILLIGRAM(S): 4 TABLET ORAL at 05:12

## 2020-12-11 RX ADMIN — Medication 1 SPRAY(S): at 17:14

## 2020-12-11 RX ADMIN — GABAPENTIN 100 MILLIGRAM(S): 400 CAPSULE ORAL at 05:13

## 2020-12-11 NOTE — PROGRESS NOTE ADULT - ASSESSMENT
60 year old male with history of HTN, LALA,  MI with HERNAN x 2 to distal RCA, renal cell carcinoma with metastasis to lung s/p left nephrectomy and left upper lung lobectomy/wedge resection admitted with syncope    Syncope:  HTN  LALA  CAD: S/P MI:  RENAL CELL CA: S/P RUL WEDGE RESECTION  SUPRACLAVICULAR LYMPHADENOPATHY / MEDIASTINA LYMPHADENOPATHY: NEW:   RENAL CELL CA    12/10  Syncope: WORKUP PER PRIMARY TEAM   COVID 19 +: he i snot sob : no symptomatic check d d leni: crp and ferritin: he is on room air at this time:   HTN: controlled  LALA: outpt follow up with primary team   CAD: S/P MI:  RENAL CELL CA: S/P RUL WEDGE RESECTION  SUPRACLAVICULAR LYMPHADENOPATHY / MEDIASTINA LYMPHADENOPATHY: NEW: this is important as he has had renal cell ca ? recurrence: need to biopsyy on this admission ??  RENAL CELL CA  dw team       12/11  Syncope: WORKUP PER PRIMARY TEAM   COVID 19 +: he i snot sob : no symptomatic check d d leni: crp and ferritin: he is on room air at this time:  cont curretn rx  HTN: controlled  LALA: outpt follow up with primary team   CAD: S/P MI:  RENAL CELL CA: S/P RUL WEDGE RESECTION  SUPRACLAVICULAR LYMPHADENOPATHY / MEDIASTINA LYMPHADENOPATHY: NEW: this is important as he has had renal cell ca : he is awaire of this and says hi oncologist is also aware of it and he follow as an outapteint  RENAL CELL CA  dw PMD

## 2020-12-11 NOTE — PROGRESS NOTE ADULT - SUBJECTIVE AND OBJECTIVE BOX
Patient is a 60y old  Male who presents with a chief complaint of syncope (11 Dec 2020 12:15)      SUBJECTIVE / OVERNIGHT EVENTS: overnight events noted    ROS:  Resp: No cough no sputum production  CVS: No chest pain no palpitations no orthopnea  GI: no N/V/D  : no dysuria, no hematuria  Neuro: no weakness no paresthesias  improved back pain and spasms        MEDICATIONS  (STANDING):  allopurinol 100 milliGRAM(s) Oral daily  amLODIPine   Tablet 10 milliGRAM(s) Oral daily  aspirin enteric coated 81 milliGRAM(s) Oral daily  atorvastatin 80 milliGRAM(s) Oral at bedtime  colchicine 0.6 milliGRAM(s) Oral daily  fluticasone propionate 50 MICROgram(s)/spray Nasal Spray 1 Spray(s) Both Nostrils two times a day  gabapentin 100 milliGRAM(s) Oral two times a day  heparin   Injectable 5000 Unit(s) SubCutaneous every 12 hours  levothyroxine 150 MICROGram(s) Oral daily  metoprolol succinate ER 25 milliGRAM(s) Oral daily  pantoprazole    Tablet 40 milliGRAM(s) Oral before breakfast  ticagrelor 90 milliGRAM(s) Oral every 12 hours  tiZANidine 2 milliGRAM(s) Oral three times a day    MEDICATIONS  (PRN):  ALBUTerol    90 MICROgram(s) HFA Inhaler 2 Puff(s) Inhalation every 6 hours PRN Shortness of Breath and/or Wheezing  benzonatate 100 milliGRAM(s) Oral three times a day PRN Cough  diazepam    Tablet 5 milliGRAM(s) Oral every 8 hours PRN back spasm  HYDROmorphone  Injectable 1 milliGRAM(s) IV Push every 4 hours PRN Severe Pain (7 - 10)  loratadine 10 milliGRAM(s) Oral daily PRN allergies  oxycodone    5 mG/acetaminophen 325 mG 2 Tablet(s) Oral every 4 hours PRN Moderate Pain (4 - 6)        CAPILLARY BLOOD GLUCOSE        I&O's Summary    11 Dec 2020 07:01  -  11 Dec 2020 12:24  --------------------------------------------------------  IN: 250 mL / OUT: 0 mL / NET: 250 mL        Vital Signs Last 24 Hrs  T(C): 36.7 (11 Dec 2020 09:22), Max: 36.9 (10 Dec 2020 18:38)  T(F): 98 (11 Dec 2020 09:22), Max: 98.4 (10 Dec 2020 18:38)  HR: 76 (11 Dec 2020 09:22) (67 - 76)  BP: 121/80 (11 Dec 2020 09:22) (121/80 - 131/90)  BP(mean): --  RR: 18 (11 Dec 2020 09:22) (17 - 18)  SpO2: 97% (11 Dec 2020 09:22) (97% - 99%)    PHYSICAL EXAM:   Neck: Supple,  Lungs: no wheeze, no crackles  CVS: S1 S2 no M/R/G  Abdomen: no tenderness, BS present  Neuro: AO x 3 nonfocal  Psych: appropriate affect  Skin: warm, dry  Ext: no edema  Back: + paraspinal spasm    LABS:                        15.5   4.63  )-----------( 99       ( 11 Dec 2020 06:46 )             49.1     12-11    137  |  104  |  28<H>  ----------------------------<  84  4.3   |  24  |  1.55<H>    Ca    8.1<L>      11 Dec 2020 06:46    TPro  6.9  /  Alb  3.2<L>  /  TBili  0.8  /  DBili  x   /  AST  51<H>  /  ALT  47<H>  /  AlkPhos  78  12-11    PT/INR - ( 09 Dec 2020 12:31 )   PT: 12.8 sec;   INR: 1.12 ratio         PTT - ( 09 Dec 2020 12:31 )  PTT:30.2 sec            All consultant(s) notes reviewed and care discussed with other providers        Contact Number, Dr Tejada [phone]

## 2020-12-11 NOTE — PROGRESS NOTE ADULT - PROBLEM SELECTOR PLAN 7
pulmonary follow up appreciated TSH 15 but free T4 normal  continue current dose synthroid  no intervention required at this time   repeat TFTs as outpatient

## 2020-12-11 NOTE — PROGRESS NOTE ADULT - ASSESSMENT
echo 5/28/20: EF 34%,  grossly moderate to severe segmental left ventricular systolic function Hypokinesis of the inferior wall, inferolateral wall, and inferoseptum  Cath 5/28/20: HERNAN x 2 to distal RCA    a/p  Patient is a 60 year old man with history of HTN, LALA,  MI with HERNAN x 2 to distal RCA, renal cell carcinoma with metastasis to lung s/p left nephrectomy and left upper lung lobectomy/wedge resection (?), presenting with syncopal episode    1. ?Syncope  -likely vasovagal in setting of hypovolemia, covid infection, orthostatic bp changes  -no prodromal cardiac symptoms reported  -CT head noted  -EKG without ischemic changes  orthostatics negative s/p IVF    2. CAD  -s/p HERNAN x 2 to distal RCA in May 2020  -CV stable  -TTE 5/28 with EF 34%, moderate to severe segmental LV systolic dysfunction; Hypokinesis of the inferior wall, inferolateral wall, and inferoseptum  -resume outpatient meds: ASA, Brilinta 90 mg bid,  Atorvastatin 80 mg daily, Metoprolol tarte, 12.5 BID  -continue to hold acei for ROSEANN    3. Ischemic Cardiomyopathy  -stable, no clinical hf, no diuretic need, on room air  -continue bb   -TTE 5/28 with EF 34%, moderate to severe segmental LV systolic dysfunction; Hypokinesis of the inferior wall, inferolateral wall, and inferoseptum    4. CKD  -trend creat    5. Back pain   -CT spine noted  -med f/u     dvt ppx    echo 5/28/20: EF 34%,  grossly moderate to severe segmental left ventricular systolic function Hypokinesis of the inferior wall, inferolateral wall, and inferoseptum  Cath 5/28/20: HERNAN x 2 to distal RCA    a/p  Patient is a 60 year old man with history of HTN, LALA,  MI with HERNAN x 2 to distal RCA, renal cell carcinoma with metastasis to lung s/p left nephrectomy and left upper lung lobectomy/wedge resection (?), presenting with syncopal episode    1. ?Syncope  -likely vasovagal in setting of hypovolemia, covid infection, orthostatic bp changes  -no prodromal cardiac symptoms reported  -CT head noted  -EKG without ischemic changes  orthostatics negative s/p IVF    2. CAD  -s/p HERNAN x 2 to distal RCA in May 2020  -CV stable  -TTE 5/28 with EF 34%, moderate to severe segmental LV systolic dysfunction; Hypokinesis of the inferior wall, inferolateral wall, and inferoseptum  -resume outpatient meds: ASA, Brilinta 90 mg bid,  Atorvastatin 80 mg daily, Metoprolol tarte, 12.5 BID  -continue to hold acei for ROSEANN    3. Ischemic Cardiomyopathy  -stable, no clinical hf, no diuretic need, on room air  -continue bb   -TTE 5/28 with EF 34%, moderate to severe segmental LV systolic dysfunction; Hypokinesis of the inferior wall, inferolateral wall, and inferoseptum    4. CKD  -trend creat    5. Back pain   -ct without any fracture   -per Neuro mri  thoracic/lumbar spine with and without contrast when stable       dvt ppx

## 2020-12-11 NOTE — PROGRESS NOTE ADULT - SUBJECTIVE AND OBJECTIVE BOX
Patient is a 60y old  Male who presents with a chief complaint of syncope (11 Dec 2020 11:25)      Any change in ROS: Giacomo patricio  OK: no SOB : no cough      MEDICATIONS  (STANDING):  allopurinol 100 milliGRAM(s) Oral daily  amLODIPine   Tablet 10 milliGRAM(s) Oral daily  aspirin enteric coated 81 milliGRAM(s) Oral daily  atorvastatin 80 milliGRAM(s) Oral at bedtime  colchicine 0.6 milliGRAM(s) Oral daily  fluticasone propionate 50 MICROgram(s)/spray Nasal Spray 1 Spray(s) Both Nostrils two times a day  gabapentin 100 milliGRAM(s) Oral two times a day  heparin   Injectable 5000 Unit(s) SubCutaneous every 12 hours  levothyroxine 150 MICROGram(s) Oral daily  metoprolol succinate ER 25 milliGRAM(s) Oral daily  pantoprazole    Tablet 40 milliGRAM(s) Oral before breakfast  ticagrelor 90 milliGRAM(s) Oral every 12 hours  tiZANidine 2 milliGRAM(s) Oral three times a day    MEDICATIONS  (PRN):  ALBUTerol    90 MICROgram(s) HFA Inhaler 2 Puff(s) Inhalation every 6 hours PRN Shortness of Breath and/or Wheezing  benzonatate 100 milliGRAM(s) Oral three times a day PRN Cough  diazepam    Tablet 5 milliGRAM(s) Oral every 8 hours PRN back spasm  HYDROmorphone  Injectable 1 milliGRAM(s) IV Push every 4 hours PRN Severe Pain (7 - 10)  loratadine 10 milliGRAM(s) Oral daily PRN allergies  oxycodone    5 mG/acetaminophen 325 mG 2 Tablet(s) Oral every 4 hours PRN Moderate Pain (4 - 6)    Vital Signs Last 24 Hrs  T(C): 36.7 (11 Dec 2020 09:22), Max: 36.9 (10 Dec 2020 18:38)  T(F): 98 (11 Dec 2020 09:22), Max: 98.4 (10 Dec 2020 18:38)  HR: 76 (11 Dec 2020 09:22) (67 - 76)  BP: 121/80 (11 Dec 2020 09:22) (121/80 - 131/90)  BP(mean): --  RR: 18 (11 Dec 2020 09:22) (17 - 18)  SpO2: 97% (11 Dec 2020 09:22) (97% - 99%)    I&O's Summary    11 Dec 2020 07:01  -  11 Dec 2020 12:15  --------------------------------------------------------  IN: 250 mL / OUT: 0 mL / NET: 250 mL          Physical Exam:   GENERAL: NAD, well-groomed, well-developed  HEENT: SKY/   Atraumatic, Normocephalic  ENMT: No tonsillar erythema, exudates, or enlargement; Moist mucous membranes, Good dentition, No lesions  NECK: Supple, No JVD, Normal thyroid  CHEST/LUNG: Clear to auscultaion, ; No rales, rhonchi, wheezing, or rubs  CVS: Regular rate and rhythm; No murmurs, rubs, or gallops  GI: : Soft, Nontender, Nondistended; Bowel sounds present  NERVOUS SYSTEM:  Alert & Oriented X3  EXTREMITIES:  2+ Peripheral Pulses, No clubbing, cyanosis, or edema  LYMPH: No lymphadenopathy noted  SKIN: No rashes or lesions  ENDOCRINOLOGY: No Thyromegaly  PSYCH: Appropriate    Labs:  22, 22                            15.5   4.63  )-----------( 99       ( 11 Dec 2020 06:46 )             49.1                         17.4   5.46  )-----------( 117      ( 09 Dec 2020 12:31 )             53.8     12-11    137  |  104  |  28<H>  ----------------------------<  84  4.3   |  24  |  1.55<H>  12-10    135  |  101  |  30<H>  ----------------------------<  99  4.5   |  21<L>  |  1.56<H>  12-09    135  |  98  |  27<H>  ----------------------------<  143<H>  4.7   |  23  |  2.01<H>    Ca    8.1<L>      11 Dec 2020 06:46  Ca    8.3<L>      10 Dec 2020 18:26  Ca    9.2      09 Dec 2020 12:31    TPro  6.9  /  Alb  3.2<L>  /  TBili  0.8  /  DBili  x   /  AST  51<H>  /  ALT  47<H>  /  AlkPhos  78  12-11  TPro  7.0  /  Alb  3.4  /  TBili  1.0  /  DBili  x   /  AST  49<H>  /  ALT  45<H>  /  AlkPhos  85  12-10  TPro  7.8  /  Alb  3.7  /  TBili  1.1  /  DBili  x   /  AST  44<H>  /  ALT  41  /  AlkPhos  96  12-09    CAPILLARY BLOOD GLUCOSE          LIVER FUNCTIONS - ( 11 Dec 2020 06:46 )  Alb: 3.2 g/dL / Pro: 6.9 g/dL / ALK PHOS: 78 U/L / ALT: 47 U/L / AST: 51 U/L / GGT: x           PT/INR - ( 09 Dec 2020 12:31 )   PT: 12.8 sec;   INR: 1.12 ratio         PTT - ( 09 Dec 2020 12:31 )  PTT:30.2 sec    D-Dimer Assay, Quantitative: 264 ng/mL DDU (12-11 @ 06:46)        RECENT CULTURES:  12-09 @ 16:37 .Blood Blood-Venous         < from: CT Chest No Cont (12.09.20 @ 16:00) >    FINDINGS:    LUNGS AND AIRWAYS: Patent central airways.  Status post right upper lobectomy and partial right lower lobe resection.    PLEURA: No pleural effusion.    MEDIASTINUM AND DANETTE: Multiple enlarged mediastinal lymph nodes, for example a reference right paratracheal lymph node measures 1.7 x 1.5 cm (308-37).    VESSELS: Within normal limits.    HEART: Heart size is normal. Trace pericardial fluid. Atherosclerotic changes of the coronary arteries. Coronary artery stents.    CHEST WALL AND LOWER NECK: An enlarged left supraclavicular lymph node measures2.1 x 1.9 cm (308-12).    VISUALIZED UPPER ABDOMEN: Small hiatal hernia.    BONES: Degenerative changes. Partial right posterior sixth rib resection.    IMPRESSION:  No pneumonia.    New left supraclavicular and mediastinal lymphadenopathy since 2015.    Post right upper lobectomy and partial right lower lobe resection with associated volume loss and architectural distortion.            EMA SCHROEDER MD; Resident Radiology  This document has been electronically signed.  LILLIAM CAGLE MD; Attending Radiologist  This document has been electronically signed. Dec  9 2020  4:56PM    < end of copied text >         No growth to date.          RESPIRATORY CULTURES:          Studies  Chest X-RAY  CT SCAN Chest   Venous Dopplers: LE:   CT Abdomen  Others

## 2020-12-11 NOTE — PROGRESS NOTE ADULT - SUBJECTIVE AND OBJECTIVE BOX
Overnight events noted   VITAL: T(C): , Max: 36.9 (12-10-20 @ 18:38) T(F): , Max: 98.4 (12-10-20 @ 18:38) HR: 76 (12-11-20 @ 09:22) BP: 121/80 (12-11-20 @ 09:22) RR: 18 (12-11-20 @ 09:22) SpO2: 97% (12-11-20 @ 09:22)   PHYSICAL EXAM: Constitutional: NAD, Alert HEENT: NCAT, MMM Neck: Supple, No JVD Respiratory: CTA-b/l Cardiovascular: RRR s1s2, no m/r/g Gastrointestinal: BS+, soft, (+)distension, NT Extremities: trace b/l LE edema Neurological: no focal deficits; strength grossly intact Back: no CVAT b/l Skin: No rashes, no nevi   LABS:                      15.5  4.63  )-----------( 99       ( 11 Dec 2020 06:46 )            49.1   Na(137)/K(4.3)/Cl(104)/HCO3(24)/BUN(28)/Cr(1.55)Glu(84)/Ca(8.1)/Mg(--)/PO4(--)    12-11 @ 06:46 Na(135)/K(4.5)/Cl(101)/HCO3(21)/BUN(30)/Cr(1.56)Glu(99)/Ca(8.3)/Mg(--)/PO4(--)    12-10 @ 18:26 Na(135)/K(4.7)/Cl(98)/HCO3(23)/BUN(27)/Cr(2.01)Glu(143)/Ca(9.2)/Mg(--)/PO4(--)    12-09 @ 12:31   IMPRESSION: 60M w/ HTN, CAD, BiVCHF, stage 4 RCC s/p L nephrectomy/left lobectomies 2016, 12/9/20 a/w COVID PNA  (1)Renal - proteinuric CKD3b - chronic glomerulonephritis NOS/solitary kidney. Resolved superimposed ROSEANN from prerenal azotemia from admission.  (2)CV - Cardiology input appreciated - vasovagal syncope on admission - non-orthostatic  (3)ID/Pulm - COVID PNA - Pulm input appreciated - oxygenating well   RECOMMEND: (1)Can d/c orthostatic BP checks (2)No IVF/no diuretics for now (3)Treatment of COVID PNA per primary/Pulm     Jason Phoenix MD [facility] Office: [phone] Cell: [phone]       Back pain improving   VITAL: T(C): , Max: 36.9 (12-10-20 @ 18:38) T(F): , Max: 98.4 (12-10-20 @ 18:38) HR: 76 (12-11-20 @ 09:22) BP: 121/80 (12-11-20 @ 09:22) RR: 18 (12-11-20 @ 09:22) SpO2: 97% (12-11-20 @ 09:22)   PHYSICAL EXAM: Constitutional: NAD, Alert HEENT: NCAT, MMM Neck: Supple, No JVD Respiratory: CTA-b/l Cardiovascular: RRR s1s2, no m/r/g Gastrointestinal: BS+, soft, (+)distension, NT Extremities: trace b/l LE edema Neurological: no focal deficits; strength grossly intact Back: no CVAT b/l Skin: No rashes, no nevi   LABS:                      15.5  4.63  )-----------( 99       ( 11 Dec 2020 06:46 )            49.1   Na(137)/K(4.3)/Cl(104)/HCO3(24)/BUN(28)/Cr(1.55)Glu(84)/Ca(8.1)/Mg(--)/PO4(--)    12-11 @ 06:46 Na(135)/K(4.5)/Cl(101)/HCO3(21)/BUN(30)/Cr(1.56)Glu(99)/Ca(8.3)/Mg(--)/PO4(--)    12-10 @ 18:26 Na(135)/K(4.7)/Cl(98)/HCO3(23)/BUN(27)/Cr(2.01)Glu(143)/Ca(9.2)/Mg(--)/PO4(--)    12-09 @ 12:31   IMPRESSION: 60M w/ HTN, CAD, BiVCHF, stage 4 RCC s/p L nephrectomy/left lobectomies 2016, 12/9/20 a/w COVID PNA  (1)Renal - proteinuric CKD3b - chronic glomerulonephritis NOS/solitary kidney. Resolved superimposed ROSEANN from prerenal azotemia from admission.  (2)CV - Cardiology input appreciated - vasovagal syncope on admission - non-orthostatic  (3)ID/Pulm - COVID PNA - Pulm input appreciated - oxygenating well  (4)Back pain - improving, with Zanaflex/Neurontin, and prn opiates/benzos  RECOMMEND: (1)Can d/c orthostatic BP checks (2)No IVF/no diuretics for now (3)Treatment of COVID PNA per primary/Pulm (4)D/C planning per primary team; could f/u at my office in 2-6 weeks    Jason Phoenix MD [facility] Office: [phone] Cell: [phone]

## 2020-12-11 NOTE — PROGRESS NOTE ADULT - SUBJECTIVE AND OBJECTIVE BOX
MELI ARREOLA 60y [id number]    Patient is a 60y old  Male who presents with a chief complaint of syncope (11 Dec 2020 12:24)      Follow Up/CC:  ID following for COVID    Interval History/ROS: no fever, not sob, back pain less    Allergies    No Known Allergies    Intolerances        ANTIMICROBIALS:      MEDICATIONS  (STANDING):  allopurinol 100 milliGRAM(s) Oral daily  amLODIPine   Tablet 10 milliGRAM(s) Oral daily  aspirin enteric coated 81 milliGRAM(s) Oral daily  atorvastatin 80 milliGRAM(s) Oral at bedtime  colchicine 0.6 milliGRAM(s) Oral daily  fluticasone propionate 50 MICROgram(s)/spray Nasal Spray 1 Spray(s) Both Nostrils two times a day  gabapentin 100 milliGRAM(s) Oral two times a day  heparin   Injectable 5000 Unit(s) SubCutaneous every 12 hours  levothyroxine 150 MICROGram(s) Oral daily  metoprolol succinate ER 25 milliGRAM(s) Oral daily  pantoprazole    Tablet 40 milliGRAM(s) Oral before breakfast  ticagrelor 90 milliGRAM(s) Oral every 12 hours  tiZANidine 2 milliGRAM(s) Oral three times a day    MEDICATIONS  (PRN):  ALBUTerol    90 MICROgram(s) HFA Inhaler 2 Puff(s) Inhalation every 6 hours PRN Shortness of Breath and/or Wheezing  benzonatate 100 milliGRAM(s) Oral three times a day PRN Cough  diazepam    Tablet 5 milliGRAM(s) Oral every 8 hours PRN back spasm  HYDROmorphone  Injectable 1 milliGRAM(s) IV Push every 4 hours PRN Severe Pain (7 - 10)  loratadine 10 milliGRAM(s) Oral daily PRN allergies  oxycodone    5 mG/acetaminophen 325 mG 2 Tablet(s) Oral every 4 hours PRN Moderate Pain (4 - 6)        Vital Signs Last 24 Hrs  T(C): 36.7 (11 Dec 2020 09:22), Max: 36.9 (10 Dec 2020 18:38)  T(F): 98 (11 Dec 2020 09:22), Max: 98.4 (10 Dec 2020 18:38)  HR: 76 (11 Dec 2020 09:22) (67 - 76)  BP: 121/80 (11 Dec 2020 09:22) (121/80 - 131/90)  BP(mean): --  RR: 18 (11 Dec 2020 09:22) (17 - 18)  SpO2: 97% (11 Dec 2020 09:22) (97% - 99%)    CBC Full  -  ( 11 Dec 2020 06:46 )  WBC Count : 4.63 K/uL  RBC Count : 5.74 M/uL  Hemoglobin : 15.5 g/dL  Hematocrit : 49.1 %  Platelet Count - Automated : 99 K/uL  Mean Cell Volume : 85.5 fL  Mean Cell Hemoglobin : 27.0 pg  Mean Cell Hemoglobin Concentration : 31.6 gm/dL  Auto Neutrophil # : x  Auto Lymphocyte # : x  Auto Monocyte # : x  Auto Eosinophil # : x  Auto Basophil # : x  Auto Neutrophil % : x  Auto Lymphocyte % : x  Auto Monocyte % : x  Auto Eosinophil % : x  Auto Basophil % : x    12-11    137  |  104  |  28<H>  ----------------------------<  84  4.3   |  24  |  1.55<H>    Ca    8.1<L>      11 Dec 2020 06:46    TPro  6.9  /  Alb  3.2<L>  /  TBili  0.8  /  DBili  x   /  AST  51<H>  /  ALT  47<H>  /  AlkPhos  78  12-11    LIVER FUNCTIONS - ( 11 Dec 2020 06:46 )  Alb: 3.2 g/dL / Pro: 6.9 g/dL / ALK PHOS: 78 U/L / ALT: 47 U/L / AST: 51 U/L / GGT: x               MICROBIOLOGY:  .Blood Blood-Venous  12-09-20   No growth to date.  --  --      Rapid RVP Result: Detected (12-09 @ 13:56)      RADIOLOGY    < from: CT Chest No Cont (12.09.20 @ 16:00) >  No pneumonia.    New left supraclavicular and mediastinal lymphadenopathy since 2015.    Post right upper lobectomy and partial right lower lobe resection with associated volume loss and architectural distortion.    < end of copied text >

## 2020-12-11 NOTE — PROGRESS NOTE ADULT - SUBJECTIVE AND OBJECTIVE BOX
[facility]      Seen earlier today, and examined.  - Today, patient is without complaints.           *****MEDICATIONS: Current medication reviewed and documented.    MEDICATIONS  (STANDING):  allopurinol 100 milliGRAM(s) Oral daily  amLODIPine   Tablet 10 milliGRAM(s) Oral daily  aspirin enteric coated 81 milliGRAM(s) Oral daily  atorvastatin 80 milliGRAM(s) Oral at bedtime  colchicine 0.6 milliGRAM(s) Oral daily  fluticasone propionate 50 MICROgram(s)/spray Nasal Spray 1 Spray(s) Both Nostrils two times a day  gabapentin 100 milliGRAM(s) Oral two times a day  heparin   Injectable 5000 Unit(s) SubCutaneous every 12 hours  levothyroxine 150 MICROGram(s) Oral daily  metoprolol succinate ER 25 milliGRAM(s) Oral daily  pantoprazole    Tablet 40 milliGRAM(s) Oral before breakfast  ticagrelor 90 milliGRAM(s) Oral every 12 hours  tiZANidine 2 milliGRAM(s) Oral three times a day    MEDICATIONS  (PRN):  ALBUTerol    90 MICROgram(s) HFA Inhaler 2 Puff(s) Inhalation every 6 hours PRN Shortness of Breath and/or Wheezing  benzonatate 100 milliGRAM(s) Oral three times a day PRN Cough  diazepam    Tablet 5 milliGRAM(s) Oral every 8 hours PRN back spasm  HYDROmorphone  Injectable 1 milliGRAM(s) IV Push every 4 hours PRN Severe Pain (7 - 10)  loratadine 10 milliGRAM(s) Oral daily PRN allergies  oxycodone    5 mG/acetaminophen 325 mG 2 Tablet(s) Oral every 4 hours PRN Moderate Pain (4 - 6)          ***** VITAL SIGNS:  T(F): 98 (12-11-20 @ 09:22), Max: 98.4 (12-10-20 @ 18:38)  HR: 76 (12-11-20 @ 09:22) (67 - 76)  BP: 121/80 (12-11-20 @ 09:22) (121/80 - 131/90)  RR: 18 (12-11-20 @ 09:22) (17 - 18)  SpO2: 97% (12-11-20 @ 09:22) (97% - 99%)  Wt(kg): --  ,   I&O's Summary    11 Dec 2020 07:01  -  11 Dec 2020 13:39  --------------------------------------------------------  IN: 250 mL / OUT: 0 mL / NET: 250 mL             *****PHYSICAL EXAM:   alert oriented x 3 attention comprehension are fair.  Able to name, repeat.   EOmi fundi not visualized   no nystagmus VFF to confrontation  Tongue is midline  Palate elevates symmetrically   Moving all 4 ext spontaneously no drift appreciated    Gait not assessed.            *****LAB AND IMAGING:                        15.5   4.63  )-----------( 99       ( 11 Dec 2020 06:46 )             49.1               12-11    137  |  104  |  28<H>  ----------------------------<  84  4.3   |  24  |  1.55<H>    Ca    8.1<L>      11 Dec 2020 06:46    TPro  6.9  /  Alb  3.2<L>  /  TBili  0.8  /  DBili  x   /  AST  51<H>  /  ALT  47<H>  /  AlkPhos  78  12-11                         [All pertinent recent Imaging/Reports reviewed]           *****A S S E S S M E N T   A N D   P L A N :         60 year old male with history of HTN, LALA,  MI with HERNAN x 2 to distal RCA, renal cell carcinoma with metastasis to lung s/p left nephrectomy and left upper lung lobectomy/wedge resection, presented with postnasal drip and cough x one week, accompanied with nausea and went to throw up in kitchen sink and states he lost consciousness with new onset severe back pain and spasms. The patient also had incontinence of feces. He does not recall how he ended on floor. Patient denies any chest pain, dyspnea, palpitations, edema, exertional symptoms, abdominal pain, fever, chills,  or rash.    Problem/Recommendations 1: Back pain secondary to fall   ct without any fracture   mri  thoracic/lumbar spine with and without contrast  if persistent on a nonemergent basis   no alarming features, denies any incontinence or saddle anesthesia   walking independently   control pain with gabapentin 100 bid   tizanidine 2mg tid   warm compresses to back as needed           Problem/Recommendations 2: syncope   with  incontinence likely related to vasovagal vs orthostatic due to volume contraction   cardiac w/u underway   cr increased from baseline   no clear post ictal period   will continue to monitor closely       Problem/Recommendations 3: paratracheal lesion of unclear etiology          Thank you for allowing me to participate in the care of this patient. Please do not hesitate to call me if you have any  questions.        ________________  Zoraida La MD  [facility]rological [facility]  [phone]      33 minutes spent on total encounter; more than 50 % of the visit was  spent counseling about plan of care, compliance to diet/exercise and medication regimen and or  coordinating care by the attending physician.      It is advised that stroke patients follow up with MADELINE Christopher @ [phone] in 1- 2 weeks.   Others please follow up with Dr. Michael Nissenbaum [phone]

## 2020-12-11 NOTE — PROGRESS NOTE ADULT - SUBJECTIVE AND OBJECTIVE BOX
CARDIOLOGY FOLLOW UP - Dr. Toussaint    CC: chart review, no events noted overnight       PHYSICAL EXAM:  T(C): 36.7 (12-11-20 @ 09:22), Max: 36.9 (12-10-20 @ 18:38)  HR: 76 (12-11-20 @ 09:22) (67 - 76)  BP: 121/80 (12-11-20 @ 09:22) (121/80 - 131/90)  RR: 18 (12-11-20 @ 09:22) (17 - 18)  SpO2: 97% (12-11-20 @ 09:22) (97% - 99%)  Wt(kg): --  I&O's Summary        Home Medications:  Allegra 180 mg oral tablet: 1 tab(s) orally once a day (09 Dec 2020 15:12)  allopurinol 100 mg oral tablet: 1 tab(s) orally once a day (09 Dec 2020 15:19)  amLODIPine 10 mg oral tablet: 1 tab(s) orally once a day (09 Dec 2020 15:19)  Aspirin Enteric Coated 81 mg oral delayed release tablet: 1 tab(s) orally once a day (09 Dec 2020 15:19)  atorvastatin 80 mg oral tablet: 1 tab(s) orally once a day (09 Dec 2020 15:19)  Azithromycin 5 Day Dose Pack 250 mg oral tablet: Take orally as directed for 5 days  Per pharmacy, 5-day supply dispensed 11/18/20 (09 Dec 2020 15:19)  benzonatate 100 mg oral capsule: 1 cap(s) orally 3 times a day, As Needed  Per pharmacy, 7-day supply dispensed 11/20/20 (09 Dec 2020 15:19)  Brilinta (ticagrelor) 90 mg oral tablet: 1 tab(s) orally 2 times a day (09 Dec 2020 15:19)  colchicine 0.6 mg oral tablet: 1 tab(s) orally once a day (09 Dec 2020 15:19)  levothyroxine 150 mcg (0.15 mg) oral tablet: 1 tab(s) orally once a day (09 Dec 2020 15:19)  losartan 25 mg oral tablet: 1 tab(s) orally once a day (09 Dec 2020 15:19)  Metoprolol Succinate ER 25 mg oral tablet, extended release: 1 tab(s) orally once a day (09 Dec 2020 15:19)  ProAir RespiClick 90 mcg/inh inhalation powder: 2 puff(s) inhaled 3 times a day, As Needed (09 Dec 2020 15:19)  Vitamin D3 400 intl units oral capsule: 1 cap(s) orally once a day (09 Dec 2020 15:12)  Votrient 200 mg oral tablet: 3 tab(s) orally once a day (09 Dec 2020 15:19)      MEDICATIONS  (STANDING):  allopurinol 100 milliGRAM(s) Oral daily  amLODIPine   Tablet 10 milliGRAM(s) Oral daily  aspirin enteric coated 81 milliGRAM(s) Oral daily  atorvastatin 80 milliGRAM(s) Oral at bedtime  colchicine 0.6 milliGRAM(s) Oral daily  fluticasone propionate 50 MICROgram(s)/spray Nasal Spray 1 Spray(s) Both Nostrils two times a day  gabapentin 100 milliGRAM(s) Oral two times a day  heparin   Injectable 5000 Unit(s) SubCutaneous every 12 hours  levothyroxine 150 MICROGram(s) Oral daily  metoprolol succinate ER 25 milliGRAM(s) Oral daily  pantoprazole    Tablet 40 milliGRAM(s) Oral before breakfast  sodium chloride 0.9%. 1000 milliLiter(s) (50 mL/Hr) IV Continuous <Continuous>  ticagrelor 90 milliGRAM(s) Oral every 12 hours  tiZANidine 2 milliGRAM(s) Oral three times a day      TELEMETRY: SR 60, PVCs	    ECG:  	  RADIOLOGY:   DIAGNOSTIC TESTING:  [ ] Echocardiogram:  [ ]  Catheterization:  [ ] Stress Test:    OTHER: 	    LABS:	 	    Troponin T, High Sensitivity Result: 25 ng/L (12-09 @ 14:13)  Troponin T, High Sensitivity Result: 27 ng/L (12-09 @ 12:31)                          15.5   4.63  )-----------( 99       ( 11 Dec 2020 06:46 )             49.1     12-11    137  |  104  |  28<H>  ----------------------------<  84  4.3   |  24  |  1.55<H>    Ca    8.1<L>      11 Dec 2020 06:46    TPro  6.9  /  Alb  3.2<L>  /  TBili  0.8  /  DBili  x   /  AST  51<H>  /  ALT  47<H>  /  AlkPhos  78  12-11    PT/INR - ( 09 Dec 2020 12:31 )   PT: 12.8 sec;   INR: 1.12 ratio         PTT - ( 09 Dec 2020 12:31 )  PTT:30.2 sec

## 2020-12-12 ENCOUNTER — TRANSCRIPTION ENCOUNTER (OUTPATIENT)
Age: 60
End: 2020-12-12

## 2020-12-12 VITALS
RESPIRATION RATE: 18 BRPM | SYSTOLIC BLOOD PRESSURE: 115 MMHG | DIASTOLIC BLOOD PRESSURE: 77 MMHG | HEART RATE: 77 BPM | TEMPERATURE: 99 F | OXYGEN SATURATION: 96 %

## 2020-12-12 LAB
ANION GAP SERPL CALC-SCNC: 12 MMOL/L — SIGNIFICANT CHANGE UP (ref 7–14)
BUN SERPL-MCNC: 24 MG/DL — HIGH (ref 7–23)
CALCIUM SERPL-MCNC: 8.2 MG/DL — LOW (ref 8.4–10.5)
CHLORIDE SERPL-SCNC: 103 MMOL/L — SIGNIFICANT CHANGE UP (ref 98–107)
CO2 SERPL-SCNC: 23 MMOL/L — SIGNIFICANT CHANGE UP (ref 22–31)
CREAT SERPL-MCNC: 1.64 MG/DL — HIGH (ref 0.5–1.3)
GLUCOSE SERPL-MCNC: 91 MG/DL — SIGNIFICANT CHANGE UP (ref 70–99)
HCT VFR BLD CALC: 46.1 % — SIGNIFICANT CHANGE UP (ref 39–50)
HGB BLD-MCNC: 14.5 G/DL — SIGNIFICANT CHANGE UP (ref 13–17)
MCHC RBC-ENTMCNC: 27.1 PG — SIGNIFICANT CHANGE UP (ref 27–34)
MCHC RBC-ENTMCNC: 31.5 GM/DL — LOW (ref 32–36)
MCV RBC AUTO: 86 FL — SIGNIFICANT CHANGE UP (ref 80–100)
NRBC # BLD: 0 /100 WBCS — SIGNIFICANT CHANGE UP
NRBC # FLD: 0 K/UL — SIGNIFICANT CHANGE UP
PLATELET # BLD AUTO: 89 K/UL — LOW (ref 150–400)
POTASSIUM SERPL-MCNC: 3.9 MMOL/L — SIGNIFICANT CHANGE UP (ref 3.5–5.3)
POTASSIUM SERPL-SCNC: 3.9 MMOL/L — SIGNIFICANT CHANGE UP (ref 3.5–5.3)
RBC # BLD: 5.36 M/UL — SIGNIFICANT CHANGE UP (ref 4.2–5.8)
RBC # FLD: 16.6 % — HIGH (ref 10.3–14.5)
SODIUM SERPL-SCNC: 138 MMOL/L — SIGNIFICANT CHANGE UP (ref 135–145)
WBC # BLD: 3.34 K/UL — LOW (ref 3.8–10.5)
WBC # FLD AUTO: 3.34 K/UL — LOW (ref 3.8–10.5)

## 2020-12-12 PROCEDURE — 99252 IP/OBS CONSLTJ NEW/EST SF 35: CPT

## 2020-12-12 RX ORDER — RIVAROXABAN 15 MG-20MG
1 KIT ORAL
Qty: 30 | Refills: 0
Start: 2020-12-12 | End: 2021-01-10

## 2020-12-12 RX ORDER — ACETAMINOPHEN 500 MG
650 TABLET ORAL ONCE
Refills: 0 | Status: COMPLETED | OUTPATIENT
Start: 2020-12-12 | End: 2020-12-12

## 2020-12-12 RX ORDER — FUROSEMIDE 40 MG
1 TABLET ORAL
Qty: 13 | Refills: 0
Start: 2020-12-12 | End: 2021-01-10

## 2020-12-12 RX ORDER — LOSARTAN POTASSIUM 100 MG/1
1 TABLET, FILM COATED ORAL
Qty: 0 | Refills: 0 | DISCHARGE

## 2020-12-12 RX ORDER — PAZOPANIB HYDROCHLORIDE 200 MG/1
3 TABLET, FILM COATED ORAL
Qty: 0 | Refills: 0 | DISCHARGE

## 2020-12-12 RX ORDER — GABAPENTIN 400 MG/1
1 CAPSULE ORAL
Qty: 60 | Refills: 0
Start: 2020-12-12 | End: 2021-01-10

## 2020-12-12 RX ORDER — TIZANIDINE 4 MG/1
1 TABLET ORAL
Qty: 90 | Refills: 0
Start: 2020-12-12 | End: 2021-01-10

## 2020-12-12 RX ORDER — AZITHROMYCIN 500 MG/1
0 TABLET, FILM COATED ORAL
Qty: 0 | Refills: 0 | DISCHARGE

## 2020-12-12 RX ORDER — PANTOPRAZOLE SODIUM 20 MG/1
1 TABLET, DELAYED RELEASE ORAL
Qty: 30 | Refills: 0
Start: 2020-12-12 | End: 2021-01-10

## 2020-12-12 RX ADMIN — TICAGRELOR 90 MILLIGRAM(S): 90 TABLET ORAL at 17:11

## 2020-12-12 RX ADMIN — GABAPENTIN 100 MILLIGRAM(S): 400 CAPSULE ORAL at 17:11

## 2020-12-12 RX ADMIN — OXYCODONE AND ACETAMINOPHEN 2 TABLET(S): 5; 325 TABLET ORAL at 05:33

## 2020-12-12 RX ADMIN — OXYCODONE AND ACETAMINOPHEN 2 TABLET(S): 5; 325 TABLET ORAL at 16:02

## 2020-12-12 RX ADMIN — Medication 650 MILLIGRAM(S): at 06:30

## 2020-12-12 RX ADMIN — TICAGRELOR 90 MILLIGRAM(S): 90 TABLET ORAL at 05:22

## 2020-12-12 RX ADMIN — Medication 100 MILLIGRAM(S): at 11:21

## 2020-12-12 RX ADMIN — TIZANIDINE 2 MILLIGRAM(S): 4 TABLET ORAL at 05:22

## 2020-12-12 RX ADMIN — TIZANIDINE 2 MILLIGRAM(S): 4 TABLET ORAL at 14:35

## 2020-12-12 RX ADMIN — PANTOPRAZOLE SODIUM 40 MILLIGRAM(S): 20 TABLET, DELAYED RELEASE ORAL at 05:22

## 2020-12-12 RX ADMIN — Medication 81 MILLIGRAM(S): at 11:22

## 2020-12-12 RX ADMIN — Medication 150 MICROGRAM(S): at 05:22

## 2020-12-12 RX ADMIN — Medication 1 SPRAY(S): at 17:11

## 2020-12-12 RX ADMIN — OXYCODONE AND ACETAMINOPHEN 2 TABLET(S): 5; 325 TABLET ORAL at 06:30

## 2020-12-12 RX ADMIN — Medication 0.6 MILLIGRAM(S): at 11:21

## 2020-12-12 RX ADMIN — GABAPENTIN 100 MILLIGRAM(S): 400 CAPSULE ORAL at 05:22

## 2020-12-12 RX ADMIN — Medication 1 SPRAY(S): at 05:21

## 2020-12-12 RX ADMIN — Medication 25 MILLIGRAM(S): at 05:22

## 2020-12-12 RX ADMIN — OXYCODONE AND ACETAMINOPHEN 2 TABLET(S): 5; 325 TABLET ORAL at 16:32

## 2020-12-12 RX ADMIN — AMLODIPINE BESYLATE 10 MILLIGRAM(S): 2.5 TABLET ORAL at 05:22

## 2020-12-12 RX ADMIN — Medication 650 MILLIGRAM(S): at 05:59

## 2020-12-12 NOTE — DISCHARGE NOTE NURSING/CASE MANAGEMENT/SOCIAL WORK - PATIENT PORTAL LINK FT
You can access the FollowMyHealth Patient Portal offered by [facility] by registering at the following website: [email or web address]. By joining [facility]’s FollowMyHealth portal, you will also be able to view your health information using other applications (apps) compatible with our system.

## 2020-12-12 NOTE — DISCHARGE NOTE PROVIDER - CARE PROVIDER_API CALL
Chetan Toussaint  CARDIOVASCULAR DISEASE  [address], [address]  [place] [address]  Phone: [phone]  Fax: [phone]  Follow Up Time:     Jason Phoenix (MD)  Internal Medicine; Nephrology  [address], [address]  [place] [address]  Phone: [phone]  Fax: [phone]  Follow Up Time:    Chetan Toussaint  CARDIOVASCULAR DISEASE  [address]  [place] [address]  Phone: [phone]  Fax: [phone]  Follow Up Time:     Jason Phoenix)  Internal Medicine; Nephrology  [address]  [place] [address]  Phone: [phone]  Fax: [phone]  Follow Up Time:     Perez Cunningham  CARDIAC ELECTROPHYSIOLOGY  [address] [place] [address], [address] [address]  [place] [address]  Phone: [phone]  Fax: [phone]  Follow Up Time:

## 2020-12-12 NOTE — PROGRESS NOTE ADULT - PROBLEM SELECTOR PLAN 3
likely secondary to nausea, vomiting and dehydration   continue to monitor  today's labs pending  pantoprazole for nausea, vomiting   renal help appreciated  continue to follow recommendations
resolved  creatinine at baseline
resolved  creatinine at baseline
-improved  -possible MRI per neurology

## 2020-12-12 NOTE — PROGRESS NOTE ADULT - SUBJECTIVE AND OBJECTIVE BOX
Patient is a 60y old  Male who presents with a chief complaint of syncope (11 Dec 2020 12:24)      SUBJECTIVE / OVERNIGHT EVENTS: overnight events noted    ROS:  Resp: No cough no sputum production  CVS: No chest pain no palpitations no orthopnea  GI: no N/V/D  : no dysuria, no hematuria  Neuro: no weakness no paresthesias  Heme: No petechiae no easy bruising  Msk: back pain much improved   Skin: No rash no itching        MEDICATIONS  (STANDING):  allopurinol 100 milliGRAM(s) Oral daily  amLODIPine   Tablet 10 milliGRAM(s) Oral daily  aspirin enteric coated 81 milliGRAM(s) Oral daily  atorvastatin 80 milliGRAM(s) Oral at bedtime  colchicine 0.6 milliGRAM(s) Oral daily  fluticasone propionate 50 MICROgram(s)/spray Nasal Spray 1 Spray(s) Both Nostrils two times a day  gabapentin 100 milliGRAM(s) Oral two times a day  heparin   Injectable 5000 Unit(s) SubCutaneous every 12 hours  levothyroxine 150 MICROGram(s) Oral daily  metoprolol succinate ER 25 milliGRAM(s) Oral daily  pantoprazole    Tablet 40 milliGRAM(s) Oral before breakfast  ticagrelor 90 milliGRAM(s) Oral every 12 hours  tiZANidine 2 milliGRAM(s) Oral three times a day    MEDICATIONS  (PRN):  ALBUTerol    90 MICROgram(s) HFA Inhaler 2 Puff(s) Inhalation every 6 hours PRN Shortness of Breath and/or Wheezing  benzonatate 100 milliGRAM(s) Oral three times a day PRN Cough  diazepam    Tablet 5 milliGRAM(s) Oral every 8 hours PRN back spasm  HYDROmorphone  Injectable 1 milliGRAM(s) IV Push every 4 hours PRN Severe Pain (7 - 10)  loratadine 10 milliGRAM(s) Oral daily PRN allergies  oxycodone    5 mG/acetaminophen 325 mG 2 Tablet(s) Oral every 4 hours PRN Moderate Pain (4 - 6)        CAPILLARY BLOOD GLUCOSE        I&O's Summary    11 Dec 2020 07:01  -  12 Dec 2020 07:00  --------------------------------------------------------  IN: 1160 mL / OUT: 0 mL / NET: 1160 mL        Vital Signs Last 24 Hrs  T(C): 37.3 (12 Dec 2020 05:19), Max: 37.6 (12 Dec 2020 02:00)  T(F): 99.1 (12 Dec 2020 05:19), Max: 99.7 (12 Dec 2020 02:00)  HR: 92 (12 Dec 2020 05:19) (83 - 92)  BP: 130/87 (12 Dec 2020 05:19) (125/83 - 132/86)  BP(mean): --  RR: 18 (12 Dec 2020 05:19) (18 - 19)  SpO2: 96% (12 Dec 2020 05:19) (95% - 97%)    PHYSICAL EXAM:   Neck: Supple,  Lungs: no wheeze, no crackles  CVS: S1 S2 no M/R/G  Abdomen: no tenderness, BS present  Neuro: AO x 3 nonfocal  Psych: appropriate affect  Skin: warm, dry  Ext: no edema  Back: much less paraspinal spasm    LABS:                        14.5   3.34  )-----------( 89       ( 12 Dec 2020 08:34 )             46.1     12-12    138  |  103  |  24<H>  ----------------------------<  91  3.9   |  23  |  1.64<H>    Ca    8.2<L>      12 Dec 2020 08:34    TPro  6.9  /  Alb  3.2<L>  /  TBili  0.8  /  DBili  x   /  AST  51<H>  /  ALT  47<H>  /  AlkPhos  78  12-11                All consultant(s) notes reviewed and care discussed with other providers        Contact Number, Dr Tejada [phone]

## 2020-12-12 NOTE — DISCHARGE NOTE PROVIDER - NSDCMRMEDTOKEN_GEN_ALL_CORE_FT
Allegra 180 mg oral tablet: 1 tab(s) orally once a day  allopurinol 100 mg oral tablet: 1 tab(s) orally once a day  amLODIPine 10 mg oral tablet: 1 tab(s) orally once a day  Aspirin Enteric Coated 81 mg oral delayed release tablet: 1 tab(s) orally once a day  atorvastatin 80 mg oral tablet: 1 tab(s) orally once a day  Azithromycin 5 Day Dose Pack 250 mg oral tablet: Take orally as directed for 5 days  Per pharmacy, 5-day supply dispensed 11/18/20  benzonatate 100 mg oral capsule: 1 cap(s) orally 3 times a day, As Needed  Per pharmacy, 7-day supply dispensed 11/20/20  Brilinta (ticagrelor) 90 mg oral tablet: 1 tab(s) orally 2 times a day  colchicine 0.6 mg oral tablet: 1 tab(s) orally once a day  levothyroxine 150 mcg (0.15 mg) oral tablet: 1 tab(s) orally once a day  losartan 25 mg oral tablet: 1 tab(s) orally once a day  Metoprolol Succinate ER 25 mg oral tablet, extended release: 1 tab(s) orally once a day  ProAir RespiClick 90 mcg/inh inhalation powder: 2 puff(s) inhaled 3 times a day, As Needed  Vitamin D3 400 intl units oral capsule: 1 cap(s) orally once a day  Votrient 200 mg oral tablet: 3 tab(s) orally once a day   Allegra 180 mg oral tablet: 1 tab(s) orally once a day  allopurinol 100 mg oral tablet: 1 tab(s) orally once a day  amLODIPine 10 mg oral tablet: 1 tab(s) orally once a day  Aspirin Enteric Coated 81 mg oral delayed release tablet: 1 tab(s) orally once a day  atorvastatin 80 mg oral tablet: 1 tab(s) orally once a day  benzonatate 100 mg oral capsule: 1 cap(s) orally 3 times a day, As Needed  Per pharmacy, 7-day supply dispensed 11/20/20  Brilinta (ticagrelor) 90 mg oral tablet: 1 tab(s) orally 2 times a day  colchicine 0.6 mg oral tablet: 1 tab(s) orally once a day  furosemide 20 mg oral tablet: 1 tab(s) orally 3 times a week (Mon/Wed/Frid)  gabapentin 100 mg oral capsule: 1 cap(s) orally 2 times a day  levothyroxine 150 mcg (0.15 mg) oral tablet: 1 tab(s) orally once a day  Metoprolol Succinate ER 25 mg oral tablet, extended release: 1 tab(s) orally once a day  oxycodone-acetaminophen 5 mg-325 mg oral tablet: 2 tab(s) orally every 4 hours, As needed, Moderate Pain (4 - 6) MDD:12 tabs daily  pantoprazole 40 mg oral delayed release tablet: 1 tab(s) orally once a day (before a meal)  ProAir RespiClick 90 mcg/inh inhalation powder: 2 puff(s) inhaled 3 times a day, As Needed  tiZANidine 2 mg oral tablet: 1 tab(s) orally 3 times a day  Vitamin D3 400 intl units oral capsule: 1 cap(s) orally once a day  Xarelto 10 mg oral tablet: 1 tab(s) orally once a day

## 2020-12-12 NOTE — PROGRESS NOTE ADULT - PROBLEM SELECTOR PLAN 4
improved  neuro help appreciated   continue Tizanidine   fall precautions
improved  neuro help appreciated   continue Tizanidine   fall precautions  outpatient MRI  patient reluctant to have inpatient secondary to pain
improved  neuro help appreciated   continue Tizanidine on discharge   no diazepam needed on discharge   outpatient MRI if back pain does not improve  patient reluctant to have inpatient secondary to pain
-improving  -renal following  -monitor creatinine

## 2020-12-12 NOTE — PROGRESS NOTE ADULT - ASSESSMENT
echo 5/28/20: EF 34%,  grossly moderate to severe segmental left ventricular systolic function Hypokinesis of the inferior wall, inferolateral wall, and inferoseptum  Cath 5/28/20: HERNAN x 2 to distal RCA  echo 12/11/2020 Severe segmental left ventricular systolic dysfunction. moderate diastolic dysfunction (Stage II). EF 13%    a/p  Patient is a 60 year old man with history of HTN, LALA,  MI with HERNAN x 2 to distal RCA, renal cell carcinoma with metastasis to lung s/p left nephrectomy and left upper lung lobectomy/wedge resection (?), presenting with syncopal episode    1. ?Syncope  -likely vasovagal in setting of hypovolemia, covid infection, orthostatic bp changes  -no prodromal cardiac symptoms reported  -CT head noted  -orthostatics negative s/p IVF  -EKG without ischemic changes  -ECHO with Severe segmental left ventricular systolic dysfunction. moderate diastolic dysfunction (Stage II). EF 13%  -EP for ICD eval    2. CAD  -s/p HERNAN x 2 to distal RCA in May 2020  -CV stable  -TTE 5/28 with EF 34%, moderate to severe segmental LV systolic dysfunction; Hypokinesis of the inferior wall, inferolateral wall, and inferoseptum  -c/w ASA, Brilinta 90 mg bid,  Atorvastatin 80 mg daily, toprol 25 mg daily  -continue to hold acei for ROSEANN    3. Ischemic Cardiomyopathy  -stable, no clinical hf  -continue bb , no acei or arb given roseann   -Repeat echo with EF now 13%, Severe segmental left ventricular systolic dysfunction. moderate diastolic dysfunction (Stage II).   -EP eval   -can start lasix 20 mg PO 3x weekly    4. CKD  -trend creat    5. Back pain   -ct without any fracture   -per Neuro mri  thoracic/lumbar spine with and without contrast when stable     6. COVID 19   -management tx per med, ID         dvt ppx

## 2020-12-12 NOTE — CONSULT NOTE ADULT - SUBJECTIVE AND OBJECTIVE BOX
Patient is a 60y old  Male who presents with a chief complaint of syncope (12 Dec 2020 13:20) echo 5/28/20: EF 34%,  grossly moderate to severe segmental left ventricular systolic function with hypokinesis of the inferior wall, inferolateral wall, and inferoseptum, Cath 5/28/20: HERNAN x 2 to distal RCA, HTN, LALA, MI with HERNAN x 2 to distal RCA, renal cell carcinoma with metastasis to lung s/p left nephrectomy and left upper lung lobectomy/wedge resection who was admitted to the hospital with syncopal episode associated with COVID 19 infection.  More recent echocardiogram this admission showed an EF of 13%.              HISTORY OF PRESENT ILLNESS:    PAST MEDICAL & SURGICAL HISTORY:  HTN (hypertension)    LALA (obstructive sleep apnea)    Obesity    Diverticulitis    Incisional hernia  July 2013 &amp; 2010    H/O unilateral nephrectomy  left - 11/2014 ([facility])    History of lobectomy of lung  Left upper lobe and ?wedges (per pt) - 03/2016 at [facility]    Incisional hernia  abdominal surgery approximately 2010 &amp; 2013    Diverticulitis  2010 surgery for diverticulitis      	    MEDICATIONS:  amLODIPine   Tablet 10 milliGRAM(s) Oral daily  aspirin enteric coated 81 milliGRAM(s) Oral daily  heparin   Injectable 5000 Unit(s) SubCutaneous every 12 hours  metoprolol succinate ER 25 milliGRAM(s) Oral daily  ticagrelor 90 milliGRAM(s) Oral every 12 hours      ALBUTerol    90 MICROgram(s) HFA Inhaler 2 Puff(s) Inhalation every 6 hours PRN  benzonatate 100 milliGRAM(s) Oral three times a day PRN  loratadine 10 milliGRAM(s) Oral daily PRN    diazepam    Tablet 5 milliGRAM(s) Oral every 8 hours PRN  gabapentin 100 milliGRAM(s) Oral two times a day  HYDROmorphone  Injectable 1 milliGRAM(s) IV Push every 4 hours PRN  oxycodone    5 mG/acetaminophen 325 mG 2 Tablet(s) Oral every 4 hours PRN  tiZANidine 2 milliGRAM(s) Oral three times a day    pantoprazole    Tablet 40 milliGRAM(s) Oral before breakfast    allopurinol 100 milliGRAM(s) Oral daily  atorvastatin 80 milliGRAM(s) Oral at bedtime  colchicine 0.6 milliGRAM(s) Oral daily  levothyroxine 150 MICROGram(s) Oral daily    fluticasone propionate 50 MICROgram(s)/spray Nasal Spray 1 Spray(s) Both Nostrils two times a day        Allergies    No Known Allergies    Intolerances        FAMILY HISTORY:  Family history of leukemia  father    Family history of emphysema  mother (smoker)    Family history of diabetes mellitus (Aunt, Uncle)  maternal aunts and uncles    Family history of hypertension  father        SOCIAL HISTORY    Marital Status:   Occupation:   Lives with:     SUBSTANCE USE  Tobacco Usage:  ( ) None ( ) never smoked   ( ) former smoker  ( ) current smoker; Packs per day:   Alcohol Usage: ( ) none  ( ) occasional ( ) 2-3 times a week ( ) daily; Last drink:   Recreational drugs ( ) None    CONSTITUTIONAL: No fevers, No chills, No fatigue, No weight gain  EYES: No vision changes   ENT: No congestion, No ear pain, No sore throat.  NECK: No pain, No stiffness  RESPIRATORY: No shortness of breath, No cough, No wheezing, No hemoptysis  CARDIOVASCULAR: No chest pain. No palpitations, No FIELDS, No orthopnea, No paroxysmal nocturnal dyspnea, No pleuritic pain  GASTROINTESTINAL: No abdominal pain, No nausea, No vomiting, No hematemesis, No diarrhea No constipation. No melena  GENITOURINARY: No dysuria, No frequency, No incontinence, No hematuria  NEUROLOGICAL: No dizziness, No lightheadedness, No syncope, No LOC, No headache, No numbness or weakness  MUSCULOSKELETAL: No Edema, No joint pain, No joint swelling.  PSYCHIATRIC: No anxiety, No depression  DERMATOLOGY: No diaphoresis. No itching, No rashes, No pressure ulcers  HEME/LYMPH: No easy bruising, or bleeding gums    All other review of systems is negative unless indicated above.    VITAL SIGNS  T(C): 37.3 (12-12-20 @ 05:19), Max: 37.6 (12-12-20 @ 02:00)  HR: 92 (12-12-20 @ 05:19) (83 - 92)  BP: 130/87 (12-12-20 @ 05:19) (125/83 - 132/86)  RR: 18 (12-12-20 @ 05:19) (18 - 19)  SpO2: 96% (12-12-20 @ 05:19) (95% - 97%)  Wt(kg): --    Appearance: NAD, no distress  HEENT: Moist Mucous Membranes, Anicteric, PERRL, EOMI  Cardiovascular: Regular rate and rhythm, Normal S1 S2, No JVD, No murmurs  Respiratory: Lungs clear to auscultation. No rales, No rhonchi, No wheezing. No tenderness to palpation  Gastrointestinal:  Soft, Non-tender, + BS  Neurologic: Non-focal, A&Ox3  Skin: Warm and dry, No rashes, No ecchymosis, No cyanosis  Musculoskeletal: No clubbing, No cyanosis, No edema  Vascular: Peripheral pulses palpable 2+ bilaterally  Psychiatry: Mood & affect appropriate      	    		        I&O's Summary    11 Dec 2020 07:01  -  12 Dec 2020 07:00  --------------------------------------------------------  IN: 1160 mL / OUT: 0 mL / NET: 1160 mL        LABORATORY VALUES	 	                          14.5   3.34  )-----------( 89       ( 12 Dec 2020 08:34 )             46.1       12-12    138  |  103  |  24<H>  ----------------------------<  91  3.9   |  23  |  1.64<H>  12-11    137  |  104  |  28<H>  ----------------------------<  84  4.3   |  24  |  1.55<H>    Ca    8.2<L>      12 Dec 2020 08:34  Ca    8.1<L>      11 Dec 2020 06:46    TPro  6.9  /  Alb  3.2<L>  /  TBili  0.8  /  DBili  x   /  AST  51<H>  /  ALT  47<H>  /  AlkPhos  78  12-11  TPro  7.0  /  Alb  3.4  /  TBili  1.0  /  DBili  x   /  AST  49<H>  /  ALT  45<H>  /  AlkPhos  85  12-10    LIVER FUNCTIONS - ( 11 Dec 2020 06:46 )  Alb: 3.2 g/dL / Pro: 6.9 g/dL / ALK PHOS: 78 U/L / ALT: 47 U/L / AST: 51 U/L / GGT: x               CARDIAC MARKERS:            Blood Gas Venous - Lactate: 2.2 mmol/L (12-09 @ 14:13)        Thyroid Stimulating Hormone, Serum: 15.52 uIU/mL (12-10 @ 18:26)        CAPILLARY BLOOD GLUCOSE    Patient name: MELI ARREOLA  YOB: 1960   Age: 60 (M)   MR#: [id number]  Study Date: 12/11/2020  Location: [id number]: Ehsan Murillo BELKIS  Study quality: Technically good  Referring Physician: Wood Tejada MD  Blood Pressure: 131/62 mmHg  Height: 168 cm  Weight: 83 kg  BSA: 1.9 m2  ------------------------------------------------------------------------  PROCEDURE: Transthoracic echocardiogram with 2-D, M-Mode  and complete spectral and color flow Doppler.  INDICATION: Syncope and collapse (R55)  ------------------------------------------------------------------------  DIMENSIONS:  Dimensions:     Normal Values:  LA:     2.8 cm    2.0 - 4.0 cm  Ao:     3.5 cm    2.0 - 3.8 cm  SEPTUM: 0.7 cm    0.6 - 1.2 cm  PWT: 0.7 cm    0.6 - 1.1 cm  LVIDd:  5.1 cm    3.0 - 5.6 cm  LVIDs:  4.8 cm    1.8 - 4.0 cm  Derived Variables:  LVMI: 62 g/m2  RWT: 0.27  Fractional short: 6 %  Ejection Fraction (Xaviertz): 13 %  ------------------------------------------------------------------------  OBSERVATIONS:  Mitral Valve: Normal mitral valve.  Aortic Root: Normal aortic root.  Aortic Valve: Normal trileaflet aortic valve.  Left Atrium: Normal left atrium.  LA volume index = 16  cc/m2.  Left Ventricle: Severe segmental left ventricular systolic  dysfunction. Normal left ventricular internal dimensions  and wall thicknesses. Moderate diastolic dysfunction (Stage  II).  Right Heart: Normal right atrium. Normal right ventricular  size with decreased right ventricular systolic function.  Normal tricuspid valve. Minimal tricuspid regurgitation.  Normal pulmonic valve.  Pericardium/PleuraNormal pericardium with no pericardial  effusion.  Hemodynamic: Estimated right ventricular systolic pressure  equals 31 mm Hg, assuming right atrial pressure equals 10  mm Hg, consistent with normal pulmonary pressures.  ------------------------------------------------------------------------  CONCLUSIONS:  1. Severe segmental left ventricular systolic dysfunction.  2. Moderate diastolic dysfunction (Stage II).  3. Normal right ventricular size with decreased right  ventricular systolic function.  *** Compared with echocardiogram of 5/28/2020, no  significant changes noted.  ------------------------------------------------------------------------  Confirmed on  12/11/2020 - 15:48:17 by Dick Giraldo M.D.  -----------------------------------------------------------------------    < end of copied text >      12-09 @ 13:56  229E Corona Virus --  Adenovirus NotDetec  Bordetella pertussis --  Chlamydia pneumoniae NotDetec  Entero/Rhino Virus NotDetec  HKU1 Coronavirus --  hMPV [facility]  [facility]  Influenza AH1 [facility]  Influenza AH1 [facility]  Influenza AH3 [facility]  Influenza B [facility]  Mycoplasma pneumoniae [facility]  NL63 Coronavirus --  OC43 Corornavirus --  [facility]ainfluenza 1 [facility]  Parainfluenza 2 [facility]      TELEMETRY: 	    ECG:  	  RADIOLOGY:  OTHER:

## 2020-12-12 NOTE — PROGRESS NOTE ADULT - PROBLEM SELECTOR PLAN 2
likely cause of fever   will continue to monitor   ID and pulmonary help appreciated  asymptomatic
likely cause of fever   will continue to monitor   ID and pulmonary help appreciated  no intervention required at this time   Tylenol for fever
will continue to monitor   ID and pulmonary help appreciated  asymptomatic  no treatment  CT chest with new lymphadenopathy which patient states he is aware of already and is being worked up and followed as outpatient
-better  -from covid  -antipyretics PRN  -monitor temps

## 2020-12-12 NOTE — PROGRESS NOTE ADULT - ATTENDING COMMENTS
Agree with above NP note.  cv stable  cont dap   echo with now severe lv dysfxn likely secondary to acute covid infection  cont med tx for cmp  not candidate for further w/u in light of covid infection  cont bb  eventual resume ace  eps eval for severe lv dysfxn  renal fxn improved  low dose lasix 20 3 x weekly for new lv dysfxn as outpt
Agree with above NP note.  cv stable   no acs  syncope vs neuro event  likely vasovagal in setting of hypovolemia, covid infection, orthostatic bp changes  SBP stable  chronic systolic hf, stable no chf  no diuretic need  check echo to re-eval lv fxn  ace on hold for jorge   orthostatics borderline   s/p IVF  cont dap for cad/mi/pci history
Agree with above NP note.  cv stable  cont dap   await echo   neuro w/u for back pain  ace on hold  med f/u  dvt ppx
discussed with patient in detail, expresses understanding of treatment plans.
discussed with patient in detail, expresses understanding of treatment plans.  discussed with covering ACP  discussed with cardiology attending
Kiran Medina  Attending Physician   Division of Infectious Disease  Pager #[phone]  After 5pm/weekend or no response, call #[phone]    Please call the ID service [phone] with questions or concerns over the weekend.

## 2020-12-12 NOTE — PROGRESS NOTE ADULT - PROBLEM SELECTOR PLAN 1
echocardiogram noted  severe cardiomyopathy  EF 13% down form 37% earlier  discussed with cardiology attending  start low dose furosemide 20 po q 3 times a week  euvolemic at this time  EP evaluation   likely not a candidate for AICD a this time secondary to COVID 19 infection   will defer to EP
likely vagally mediated  cardiology help appreciated  will continue to follow recommendations
unclear etiology  likely vagally mediated  cardiology help appreciated  will continue to follow  recommendations  echocardiogram   feels better today
-supportive care  -maintain oxygenation saturation  -monitor fever curve  -continue airborne/contact isolation  -supportive care  -monitor pulse ox  -contact/airborne precautions   -trend inflammatory markers  -not hypoxic- hold off remdesevir or steroids

## 2020-12-12 NOTE — PROGRESS NOTE ADULT - PROBLEM SELECTOR PROBLEM 1
Syncope and collapse
2019 novel coronavirus disease (COVID-19)

## 2020-12-12 NOTE — PROGRESS NOTE ADULT - PROBLEM SELECTOR PROBLEM 3
ROSEANN (acute kidney injury)
Back pain

## 2020-12-12 NOTE — CONSULT NOTE ADULT - ATTENDING COMMENTS
60 year old with COVID 19, HTN, syncope this admission, severe chronic systolic congestive heart failure who is now referred because of syncope and possible need for ICD.  Given that patient is in the midst of coronavirus, would hold off with ICD implantation at this time.  We believe that the episode of syncope was most likely secondary to hypotension from COVID 19 infection.  At this time patient should continue with optimal medication therapy.      Keep K > 4.0, Mg > 1.2  Maintain optimal medical therapy.  Outpatient follow up  ARB/BB
agree with the above assessment and plan by MADELINE Serrano.  Pt katlyn to us from prior admission for STEMI presenting with syncopal episode in setting of dehydration and + COVID  IVF  Orthostatics  Repeat ECHO is reasonable
discussed with patient in detail, expresses understanding of treatment plans.
Kiran Medina  Attending Physician   Division of Infectious Disease  Pager #[phone]  After 5pm/weekend or no response, call #[phone]    I am away on 12/10 and will return 12/11. ID coverage available. Call ID office @ [phone] with questions.

## 2020-12-12 NOTE — CONSULT NOTE ADULT - CONSULT REQUESTED DATE/TIME
09-Dec-2020 12:20
09-Dec-2020 17:29
10-Dec-2020 11:08
10-Dec-2020 13:02
12-Dec-2020 13:45
09-Dec-2020 15:34

## 2020-12-12 NOTE — DISCHARGE NOTE PROVIDER - HOSPITAL COURSE
60 year old male with history of HTN, LALA,  MI with HERNAN x 2 to distal RCA, renal cell carcinoma with metastasis to lung s/p left nephrectomy and left upper lung lobectomy/wedge resection admitted with syncope. CTH without acute findings. Orthostatics negative. Syncope likely vagally mediated. Telemetry monitoring and EKG unremarkable. TTE revealed decreased in EF 34% --> 13%. Cardiology followed. EP called for ICD evaluation _________. Patient with ischemic cardiomyopathy- started on lasix 20mg PO TIW for discharge. Following fall/syncope, patient complained of back pain. Neurology evaluated- pain improved with muscle relaxers and CT T/L spine without fracture. Dedicated MRI imaging recommended as outpatient. Noted to have new left supraclavicular and mediastinal lymphadenopathy since 2015 on CT chest. Reports his oncologist is aware of this finding and he is following up as an outpatient. Incidentally found to have COVID-19- no treatment initiated as patient without evidence of hypoxia and remained hemodynamically stable.                    60 year old male with history of HTN, LALA,  MI with HERNAN x 2 to distal RCA, renal cell carcinoma with metastasis to lung s/p left nephrectomy and left upper lung lobectomy/wedge resection admitted with syncope. CTH without acute findings. Orthostatics negative. Syncope likely vagally mediated. Telemetry monitoring and EKG unremarkable. TTE revealed decreased in EF 34% --> 13%. Cardiology followed. EP called for ICD evaluation no intervention at this time given covid infxn. Patient with ischemic cardiomyopathy- started on lasix 20mg PO TIW for discharge. Following fall/syncope, patient complained of back pain. Neurology evaluated- pain improved with muscle relaxers and CT T/L spine without fracture. Dedicated MRI imaging recommended as outpatient. Noted to have new left supraclavicular and mediastinal lymphadenopathy since 2015 on CT chest. Reports his oncologist is aware of this finding and he is following up as an outpatient. Incidentally found to have COVID-19- no treatment initiated as patient without evidence of hypoxia and remained hemodynamically stable.       Patient seen and evaluated. Reviewed discharge medications with patient and attending. All new medications requiring new prescriptions were sent to the pharmacy of patient's choice. Reviewed need for prescription for previous home medications and new prescriptions sent if requested. Medically cleared/stable for discharge as per  with appropriate follow up. Patient understands and agrees with plan of care.                  4

## 2020-12-12 NOTE — DISCHARGE NOTE PROVIDER - PROVIDER TOKENS
PROVIDER:[TOKEN:[[id number]]],PROVIDER:[TOKEN:[[id number]]] PROVIDER:[TOKEN:[[id number]]],PROVIDER:[TOKEN:[[id number]]],PROVIDER:[TOKEN:[[id number]]]

## 2020-12-12 NOTE — DISCHARGE NOTE PROVIDER - NSDCCPCAREPLAN_GEN_ALL_CORE_FT
PRINCIPAL DISCHARGE DIAGNOSIS  Diagnosis: Syncope and collapse  Assessment and Plan of Treatment: Your CT scan of the head did not show concerning findings. Your CT imaging of the back did not show any fractures. If your symptoms persist, please make an appointment to have a MRI of your back as an outpatient. Your echocardiogram showed your heart's pumping function significantly decline since your last echocardiogram (ultrasound of heart). Cardiology evaluated you _______________      SECONDARY DISCHARGE DIAGNOSES  Diagnosis: CKD (chronic kidney disease)  Assessment and Plan of Treatment: You have chronic kidney disease. Follow up with nephrologist Dr. Phoenix in 2-6 weeks to monitor your kidney function.    Diagnosis: Supraclavicular lymphadenopathy  Assessment and Plan of Treatment: You were noted to have enlardged lymph nodes on your CT scan of the chest: left supraclavicular and mediastinal lymphadenopathy. You have an oncologist and state you are aware of this and will follow up with your oncologist.    Diagnosis: Ischemic cardiomyopathy  Assessment and Plan of Treatment: Your echocardiogram showed decreased heart pumping action. Cardiology evaluated and recommends starting LASIX (A WATER PILL). You will take Lasix 20mg three times a week.    Diagnosis: Abnormal thyroid blood test  Assessment and Plan of Treatment: Your thyroid blood work was abnormal but you do not require thyroid medication at this time. You will need to have repeat thyroid tests (blood work) in 4-6 weeks.    Diagnosis: 2019 novel coronavirus disease (COVID-19)  Assessment and Plan of Treatment: You were found to have COVID-19. You did not require oxygen or medications.   Stay in isolation until you are cleared by your doctor or you meet the following CDC requirements:  – You have no fever (100.4 degrees F or above) consistently for at least 24 hours without taking any  fever-reducing medicines (e.g., acetaminophen, aspirin, ibuprofen).  – Your respiratory symptoms are improving.  – At least 10* days have passed since your symptoms first appeared OR your tsest was positive which was on 12/9/2020.   To prevent spread, please stay home and do not leave except to get medical care. Do not visit public areas. Avoid using public transportation, ride-sharing or taxis. Stay away from others as much as possible. While at home, if possible, stay home in a "specific room". If this is not possible, try and keep a safe distance from other people (at least 6 feet away). Use a separate bathroom if available. Cover your mouth and nose with a tissue when you cough or sneeze. Wash your hands immediately after for at least 20 seconds. Wash your hands often. This is important after blowing your nose, coughing, sneezing, going to the bathroom, before eating or after preparing food. Avoid sharing dishes, drinking glasses, cups, eating utensils, towels or bedding with other people in your home. Clean and disinfect all high-touch surfaces everyday. High touch surfaces include phones, remote controls, counters, tabletops, doorknobs, bathroom fixtures, toilets, keyboards, tablets, and bedside tables.   If you develop emergency warning signs, such as high grade fever, shortness of breath, difficulty breathing, persistent pain, pressure in the chest, new confusion, bluish lips, or if any of your symptoms begin to worsen, call your medical provider or return to the nearest emergency department.       Diagnosis: HTN (hypertension)  Assessment and Plan of Treatment: Continue with your blood pressure medication as prescribed. Routine follow up with your PCP for bloow pressure checks. A low salt diet is recommended.     PRINCIPAL DISCHARGE DIAGNOSIS  Diagnosis: Syncope and collapse  Assessment and Plan of Treatment: Your CT scan of the head did not show concerning findings. Your CT imaging of the back did not show any fractures. If your symptoms persist, please make an appointment to have a MRI of your back as an outpatient. Your echocardiogram showed your heart's pumping function significantly decline since your last echocardiogram (ultrasound of heart). Cardiology evaluated you start Xarelto 10mg daily for 30 days , this is a blood thinner to prevent clots. Monitor for signs of bleeding. You will need to make an appointment with  for outpt follow up.      SECONDARY DISCHARGE DIAGNOSES  Diagnosis: CKD (chronic kidney disease)  Assessment and Plan of Treatment: You have chronic kidney disease. Follow up with nephrologist Dr. Phoenix in 2-6 weeks to monitor your kidney function.    Diagnosis: Supraclavicular lymphadenopathy  Assessment and Plan of Treatment: You were noted to have enlardged lymph nodes on your CT scan of the chest: left supraclavicular and mediastinal lymphadenopathy. You have an oncologist and state you are aware of this and will follow up with your oncologist.    Diagnosis: Ischemic cardiomyopathy  Assessment and Plan of Treatment: Your echocardiogram showed decreased heart pumping action. Cardiology evaluated and recommends starting LASIX (A WATER PILL). You will take Lasix 20mg three times a week.    Diagnosis: Abnormal thyroid blood test  Assessment and Plan of Treatment: Your thyroid blood work was abnormal but you do not require thyroid medication at this time. You will need to have repeat thyroid tests (blood work) in 4-6 weeks.    Diagnosis: HTN (hypertension)  Assessment and Plan of Treatment: Continue with your blood pressure medication as prescribed. Routine follow up with your PCP for bloow pressure checks. A low salt diet is recommended.    Diagnosis: 2019 novel coronavirus disease (COVID-19)  Assessment and Plan of Treatment: You were found to have COVID-19. You did not require oxygen or medications.   Stay in isolation until you are cleared by your doctor or you meet the following CDC requirements:  – You have no fever (100.4 degrees F or above) consistently for at least 24 hours without taking any  fever-reducing medicines (e.g., acetaminophen, aspirin, ibuprofen).  – Your respiratory symptoms are improving.  – At least 10* days have passed since your symptoms first appeared OR your tsest was positive which was on 12/9/2020.   To prevent spread, please stay home and do not leave except to get medical care. Do not visit public areas. Avoid using public transportation, ride-sharing or taxis. Stay away from others as much as possible. While at home, if possible, stay home in a "specific room". If this is not possible, try and keep a safe distance from other people (at least 6 feet away). Use a separate bathroom if available. Cover your mouth and nose with a tissue when you cough or sneeze. Wash your hands immediately after for at least 20 seconds. Wash your hands often. This is important after blowing your nose, coughing, sneezing, going to the bathroom, before eating or after preparing food. Avoid sharing dishes, drinking glasses, cups, eating utensils, towels or bedding with other people in your home. Clean and disinfect all high-touch surfaces everyday. High touch surfaces include phones, remote controls, counters, tabletops, doorknobs, bathroom fixtures, toilets, keyboards, tablets, and bedside tables.   If you develop emergency warning signs, such as high grade fever, shortness of breath, difficulty breathing, persistent pain, pressure in the chest, new confusion, bluish lips, or if any of your symptoms begin to worsen, call your medical provider or return to the nearest emergency department.

## 2020-12-12 NOTE — PROGRESS NOTE ADULT - SUBJECTIVE AND OBJECTIVE BOX
CARDIOLOGY FOLLOW UP - Dr. Toussaint    CC chart reviewed : no acute events over night   -no events on tele    -echo 12/11 Severe segmental left ventricular systolic dysfunction. moderate diastolic dysfunction (Stage II). EF 13%      PHYSICAL EXAM:  T(C): 37.3 (12-12-20 @ 05:19), Max: 37.6 (12-12-20 @ 02:00)  HR: 92 (12-12-20 @ 05:19) (83 - 92)  BP: 130/87 (12-12-20 @ 05:19) (125/83 - 132/86)  RR: 18 (12-12-20 @ 05:19) (18 - 19)  SpO2: 96% (12-12-20 @ 05:19) (95% - 97%)  Wt(kg): --  I&O's Summary    11 Dec 2020 07:01  -  12 Dec 2020 07:00  --------------------------------------------------------  IN: 1160 mL / OUT: 0 mL / NET: 1160 mL        Home Medications:  Allegra 180 mg oral tablet: 1 tab(s) orally once a day (09 Dec 2020 15:12)  allopurinol 100 mg oral tablet: 1 tab(s) orally once a day (09 Dec 2020 15:19)  amLODIPine 10 mg oral tablet: 1 tab(s) orally once a day (09 Dec 2020 15:19)  Aspirin Enteric Coated 81 mg oral delayed release tablet: 1 tab(s) orally once a day (09 Dec 2020 15:19)  atorvastatin 80 mg oral tablet: 1 tab(s) orally once a day (09 Dec 2020 15:19)  Azithromycin 5 Day Dose Pack 250 mg oral tablet: Take orally as directed for 5 days  Per pharmacy, 5-day supply dispensed 11/18/20 (09 Dec 2020 15:19)  benzonatate 100 mg oral capsule: 1 cap(s) orally 3 times a day, As Needed  Per pharmacy, 7-day supply dispensed 11/20/20 (09 Dec 2020 15:19)  Brilinta (ticagrelor) 90 mg oral tablet: 1 tab(s) orally 2 times a day (09 Dec 2020 15:19)  colchicine 0.6 mg oral tablet: 1 tab(s) orally once a day (09 Dec 2020 15:19)  levothyroxine 150 mcg (0.15 mg) oral tablet: 1 tab(s) orally once a day (09 Dec 2020 15:19)  losartan 25 mg oral tablet: 1 tab(s) orally once a day (09 Dec 2020 15:19)  Metoprolol Succinate ER 25 mg oral tablet, extended release: 1 tab(s) orally once a day (09 Dec 2020 15:19)  ProAir RespiClick 90 mcg/inh inhalation powder: 2 puff(s) inhaled 3 times a day, As Needed (09 Dec 2020 15:19)  Vitamin D3 400 intl units oral capsule: 1 cap(s) orally once a day (09 Dec 2020 15:12)  Votrient 200 mg oral tablet: 3 tab(s) orally once a day (09 Dec 2020 15:19)      MEDICATIONS  (STANDING):  allopurinol 100 milliGRAM(s) Oral daily  amLODIPine   Tablet 10 milliGRAM(s) Oral daily  aspirin enteric coated 81 milliGRAM(s) Oral daily  atorvastatin 80 milliGRAM(s) Oral at bedtime  colchicine 0.6 milliGRAM(s) Oral daily  fluticasone propionate 50 MICROgram(s)/spray Nasal Spray 1 Spray(s) Both Nostrils two times a day  gabapentin 100 milliGRAM(s) Oral two times a day  heparin   Injectable 5000 Unit(s) SubCutaneous every 12 hours  levothyroxine 150 MICROGram(s) Oral daily  metoprolol succinate ER 25 milliGRAM(s) Oral daily  pantoprazole    Tablet 40 milliGRAM(s) Oral before breakfast  ticagrelor 90 milliGRAM(s) Oral every 12 hours  tiZANidine 2 milliGRAM(s) Oral three times a day      TELEMETRY: nsr pvc  	    ECG:  	  RADIOLOGY:   DIAGNOSTIC TESTING:  [ ] Echocardiogram:  < from: Transthoracic Echocardiogram (12.11.20 @ 13:56) >  Ejection Fraction (Hakanoltz): 13 %  ------------------------------------------------------------------------  OBSERVATIONS:  Mitral Valve: Normal mitral valve.  Aortic Root: Normal aortic root.  Aortic Valve: Normal trileaflet aortic valve.  Left Atrium: Normal left atrium.  LA volume index = 16  cc/m2.  Left Ventricle: Severe segmental left ventricular systolic  dysfunction. Normal left ventricular internal dimensions  and wall thicknesses. Moderate diastolic dysfunction (Stage  II).  Right Heart: Normal right atrium. Normal right ventricular  size with decreased right ventricular systolic function.  Normal tricuspid valve. Minimal tricuspid regurgitation.  Normal pulmonic valve.  Pericardium/PleuraNormal pericardium with no pericardial  effusion.  Hemodynamic: Estimated right ventricular systolic pressure  equals 31 mm Hg, assuming right atrial pressure equals 10  mm Hg, consistent with normal pulmonary pressures.  ------------------------------------------------------------------------  CONCLUSIONS:  1. Severe segmental left ventricular systolic dysfunction.  2. Moderate diastolic dysfunction (Stage II).  3. Normal right ventricular size with decreased right  ventricular systolic function.  *** Compared with echocardiogram of 5/28/2020, no  significant changes noted.  ------------------------------------------------------------------------  Confirmed on  12/11/2020 - 15:48:17 by Dick Giraldo M.D.  ------------------------------------------------------------------------    < end of copied text >    [ ]  Catheterization:  [ ] Stress Test:    OTHER: 	    LABS:	 	    Troponin T, High Sensitivity Result: 25 ng/L (12-09 @ 14:13)  Troponin T, High Sensitivity Result: 27 ng/L (12-09 @ 12:31)                          14.5   3.34  )-----------( 89       ( 12 Dec 2020 08:34 )             46.1     12-12    138  |  103  |  24<H>  ----------------------------<  91  3.9   |  23  |  1.64<H>    Ca    8.2<L>      12 Dec 2020 08:34    TPro  6.9  /  Alb  3.2<L>  /  TBili  0.8  /  DBili  x   /  AST  51<H>  /  ALT  47<H>  /  AlkPhos  78  12-11

## 2020-12-12 NOTE — DISCHARGE NOTE PROVIDER - CARE PROVIDERS DIRECT ADDRESSES
,DirectAddress_Unknown,[email or web address] ,[email or web address]

## 2020-12-12 NOTE — PROGRESS NOTE ADULT - PROBLEM SELECTOR PLAN 7
TSH 15 but free T4 normal  continue current dose synthroid  no intervention required at this time   repeat TFTs as outpatient

## 2020-12-12 NOTE — CONSULT NOTE ADULT - CONSULT REASON
COVID
Dr Medina pt: spoke to him: he doesnot come to lij: ok with him  COVID postive
Severe left ventricular dysfunction and possible need for future ICD and syncope.
syncope
syncope
ROSEANN

## 2020-12-12 NOTE — CONSULT NOTE ADULT - ASSESSMENT
60 year old with COVID 19, HTN, syncope this admission, severe chronic systolic congestive heart failure who is now referred because of syncope and possible need for ICD.  Given that patient is in the midst of coronavirus, would hold off with ICD implantation at this time.  We believe that the episode of syncope was most likely secondary to hypotension from COVID 19 infection.  At this time patient should continue with optimal medication therapy.      Keep K > 4.0, Mg > 1.2  Maintain optimal medical therapy.  Outpatient follow up

## 2020-12-13 ENCOUNTER — TRANSCRIPTION ENCOUNTER (OUTPATIENT)
Age: 60
End: 2020-12-13

## 2020-12-14 LAB
CULTURE RESULTS: SIGNIFICANT CHANGE UP
CULTURE RESULTS: SIGNIFICANT CHANGE UP
SPECIMEN SOURCE: SIGNIFICANT CHANGE UP
SPECIMEN SOURCE: SIGNIFICANT CHANGE UP

## 2021-01-07 NOTE — ED ADULT NURSE NOTE - CHIEF COMPLAINT QUOTE
Oncologist sent for CXR 2/2 cough and fever. PMH metastatic renal cell carcinoma on PO treatment. Pt not on chemo or radiation. Tested for the flu yesterday and came back negative. Sent to r/o PNA. Calcipotriene Pregnancy And Lactation Text: This medication has not been proven safe during pregnancy. It is unknown if this medication is excreted in breast milk.

## 2021-06-10 NOTE — H&P ADULT - NSHPREVIEWOFSYSTEMS_GEN_ALL_CORE
Vitreous opacities/debris are not visually significant. Gen: no loss of wt no loss of appetite  ENT: no dizziness no hearing loss  Ophth: no blurring of vision no loss of vision  Resp: No cough no sputum production  CVS: No chest pain no palpitations no orthopnea  GI: + nausea, vomiting or diarrhea  see above HPI  states his occasional nausea, vomiting is from postnasal drip  : no dysuria, hematuria  Endo: no polyuria no excessive sweating  Neuro: no weakness no paresthesias   Heme: No petechiae no easy bruising  Msk: No joint pain no swelling + back pain  Skin: No rash no itching

## 2022-07-23 NOTE — H&P ADULT - NSHPPHYSICALEXAM_GEN_ALL_CORE
[Abdominal Pain] : abdominal pain ICU Vital Signs Last 24 Hrs  T(C): 36.8 (28 May 2020 14:15), Max: 36.9 (28 May 2020 12:21)  T(F): 98.2 (28 May 2020 14:15), Max: 98.4 (28 May 2020 12:21)  HR: 58 (28 May 2020 14:30) (58 - 74)  BP: 148/95 (28 May 2020 14:30) (148/95 - 172/107)  BP(mean): 109 (28 May 2020 14:30) (109 - 109)  ABP: --  ABP(mean): --  RR: 16 (28 May 2020 14:30) (16 - 16)  SpO2: 100% (28 May 2020 14:30) (100% - 100%)      PHYSICAL EXAM:  GENERAL: NAD, well-groomed, well-developed  HEAD: Atraumatic, Normocephalic  EYES: EOMI, PERRLA, conjunctiva and sclera clear  ENMT: No tonsillar erythema, exudates, or enlargement; Moist mucous membranes  NECK: Supple, No JVD, Normal Thyroid  NERVOUS SYSTEM: Alert & Oriented X3, Good concentration; Motor Strength 5/5 B/L upper and lower extremities  CHEST/LUNG: Clear to auscultation bilaterally; No rales, rhonchi, wheezing, or rubs  HEART: Regular rate and rhythm; S1 and S2; No murmurs, rubs, or gallops  ABDOMEN: Soft, Nontender, Nondistended: Bowel sounds present  EXTREMITIES: 2+ Peripheral Pulses, No clubbing, cyanosis, or edema  LYMPH: No lymphadenopathy noted  SKIN: No rashes or lesions [Negative] : Respiratory ICU Vital Signs Last 24 Hrs  T(C): 36.8 (28 May 2020 14:15), Max: 36.9 (28 May 2020 12:21)  T(F): 98.2 (28 May 2020 14:15), Max: 98.4 (28 May 2020 12:21)  HR: 58 (28 May 2020 14:30) (58 - 74)  BP: 148/95 (28 May 2020 14:30) (148/95 - 172/107)  BP(mean): 109 (28 May 2020 14:30) (109 - 109)  ABP: --  ABP(mean): --  RR: 16 (28 May 2020 14:30) (16 - 16)  SpO2: 100% (28 May 2020 14:30) (100% - 100%)    PHYSICAL EXAM:  GENERAL: NAD, well-developed male in NAD on room air  HEENT: Moist mucous membranes  NECK: Supple, No JVD  NERVOUS SYSTEM: Alert & Oriented X3; no focal deficits  CHEST/LUNG: Clear to auscultation bilaterally; No rales, rhonchi, wheezing, or rubs  HEART: Regular rate and rhythm; S1 and S2; No murmurs, rubs, or gallops  ABDOMEN: Soft, Nontender, Nondistended: Bowel sounds present  EXTREMITIES: 2+ Peripheral Pulses, No clubbing, cyanosis, or edema  LYMPH: No lymphadenopathy noted  SKIN: No rashes or lesions

## 2022-08-24 NOTE — DISCHARGE NOTE NURSING/CASE MANAGEMENT/SOCIAL WORK - CAREGIVER PHONE NUMBER
Pt ambulated to the treatment area with a slight limp. Pt states \"I have had a sore on my right foot big toe for a few months. I saw my doctor Natacha St she gave me a script for and xray that I had done yesterday. I had my last antibiotic Keflex on Saturday morning. I have an appointment with wound care on Sept 6th at 230pm. My toe is hurting more so they said I should go to the ER. \"
[phone]

## 2022-12-14 NOTE — ED PROVIDER NOTE - CONSTITUTIONAL, MLM
normal... Well appearing, awake, alert, oriented to person, place, time/situation and in no apparent distress. n/a

## 2023-08-08 NOTE — PROGRESS NOTE ADULT - PROBLEM SELECTOR PLAN 2
BP increase over last 10 days per patient, likely 2/2 to introduction of Votrient medication as hypertension is known side effect. BP currently 136/92.  - c/w Avapro 50 mg (continued as Losartan 50 mg), Amlodipine 5 mg and Metoprolol 25 mg negative mouth/pharynx

## 2024-08-07 NOTE — H&P ADULT - GIT ABD PE PAL DETAILS PC
ASSESSMENT & PLAN    Soham was seen today for pain.    Diagnoses and all orders for this visit:    Primary osteoarthritis of right knee  -     Orthopedic  Referral  -     XR Knee Standing AP Bilat [place] Bilat Lat Right; Future  -     Physical Therapy  Referral; Future  -     Large Joint Injection/Arthocentesis: R knee joint      This issue is chronic and Worsening. Soham presents to clinic today to discuss his chronic, intermittent right knee pain.  His history, exam findings, and imaging findings are consistent with radiographically severe medial compartment osteoarthritis and his radiographically moderate patellofemoral osteoarthritis is the main  of his symptoms.  [place] these findings and the spectrum of treatment options including anti-inflammatory medicines, physical therapy, corticosteroid injections, hyaluronic acid injections, and surgical intervention in the form of a knee replacement.  We discussed that while his medial compartment osteoarthritis is severe, given his symptoms are intermittent and only last for a few days to a week, he would not be a good candidate for knee replacement at this time.  We discussed the importance of physical therapy to help strengthen the muscles around the knee and support the joint with his degenerative changes.  We discussed the utility of a corticosteroid injection for both pain relief and to allow the patient to better participate in physical therapy, and after this discussion the patient wished to proceed with a corticosteroid injection today.  We determined the following plan:  - CSI to the right knee performed today under ultrasound guidance, see procedure note below for details  - Physical therapy referral placed  - He can continue to use over-the-counter pain medicines as needed  - He can follow-up in our clinic as needed      Large Joint Injection/Arthocentesis: R knee joint    Date/Time: 8/7/2024 11:55 AM    Performed by: Saleem Barakat    Authorized by: Saleem Barakat DO    Indications:  Pain and osteoarthritis  Needle Size:  25 G  Guidance: ultrasound    Approach:  Anterolateral  Location:  Knee      Medications:  40 mg triamcinolone 40 MG/ML; 3 mL lidocaine 1 %; 2 mL lidocaine 1 %  Medications comment:  2 ml 0.5% Ropivacaine NDC 32465-037-66, Lot# [id number], Expires 02/2025  Outcome:  Tolerated well, no immediate complications  Procedure discussed: discussed risks, benefits, and alternatives    Consent Given by:  Patient  Timeout: timeout called immediately prior to procedure    Prep: patient was prepped and draped in usual sterile fashion     Ultrasound was used to ensure safe and accurate needle placement and injection. Ultrasound images of the procedure were permanently stored.  1ml of 8.4% Sodium Bicarbonate solution was used to buffer the local numbing agent for today's injection        Saleem Barakat DO  [facility] SPORTS MEDICINE [facility]    -----  Chief Complaint   Patient presents with    Right Knee - Pain       SUBJECTIVE  Gualberto Wood is a/an 70 year old male who is seen in consultation at the request of  Delphine Wolf M.D. for evaluation of sharp shooting pain in the right knee.     The patient is seen by themselves.    Onset: 9-12 month(s) ago. Reports insidious onset without acute precipitating event.  Location of Pain: medial knee  Worsened by: sitting or laying on back  Better with: standing, walking  Treatments tried: ice and Aleve, brace (helps a little)  Associated symptoms: swelling    Orthopedic/Surgical history: L knee ACL-R 1991, L knee meniscus repair 2010; No back history either  Social History/Occupation: Mostly retired; works 1 day per week as       REVIEW OF SYSTEMS:  Review of systems negative unless mentioned in HPI     OBJECTIVE:  Wt 98.1 kg (216 lb 4.8 oz)   BMI 31.04 kg/m     General: healthy, alert and in no distress  Skin: no suspicious lesions or rash.  CV: distal  perfusion intact   Resp: normal respiratory effort without conversational dyspnea   Psych: normal mood and affect  Gait: NORMAL  Neuro: Normal light sensory exam of RL extremity     Right Knee exam  Gait: Normal  Alignment:  [x] Normal  [] Anatomic valgus  [] Anatomic varus  Inspection: [x] Normal  [] Ecchymosis present []Other   Palpation:  Joint Tenderness: [] No Tenderness  [x] MJL [] LJL [] MCL Margin [] LCL Margin [] Pes Anserine [] Distal Quadricep  [] Other   Peripatellar Tenderness: [x] None [] Lateral pole [] Medial pole [] Superior pole [] Inferior pole    Patellar tendon pain: [x] None [] Present    Patellar apprehension: [x] None [] Present    Patellar motion: [x] Normal [] Abnormal  Crepitus: [x] None [] Present  Effusion: [x] None [] Trace [] 1+ [] 2+ [] 3+  Range of motion:  Flexion: 135, Extension: 0  Strength:  [x] Full in all planes, including intact extensor mechanism [] Limited as described  Neurologic: Normal sensation   Vascular: Normal pulses   Special tests:   Lachman: Negative  Anterior Drawer: Negative  Sag/quad Activation: NP  Posterior Drawer: Negative  Valgus Stress: Negative at 0/30  Varus Stress: Negative at 0/30  Luiz's: Negative  Thessaly: NP     Other notable findings/comments: None       RADIOLOGY:  Final results and radiologist's interpretation, available in the [facility] health record.  Images were reviewed with the patient in the office today.  My personal interpretation of the performed imaging: Severe joint space narrowing and osteophytosis of the medial compartment of the knee where there is bone-on-bone articulation.  Moderate patellofemoral joint space narrowing and osteophytosis.  No acute fractures.           bowel sounds hyperactive

## 2024-10-29 NOTE — ED CDU PROVIDER INITIAL DAY NOTE - DATE/TIME 1
Detail Level: Detailed
Plan: ketoconazole 2 % shampoo \\nQuantity: 120.0 ml  Days Supply: 90\\nSig: Apply to scalp and ears 3 times a week and leave in for 5 minutes and then rinse.\\n\\nfluocinonide 0.05 % topical solution \\nQuantity: 20.0 ml  Days Supply: 30\\nSig: Apply to dry scalp   bid for 2 weeks , then as needed for itching no more than two weeks per month.
Render In Strict Bullet Format?: No
11-Jun-2020 08:49

## 2024-12-23 NOTE — PROGRESS NOTE ADULT - NSREFPHYEXREFTO_GEN_ALL_CORE
Physician provider: Dr. Peters  Reason for today's call (Please detail here patients chief complaint): Pt trying to get  for   Last office visit:na  Patient Callback Number: [phone] Rosalva Dan - wife of pt  Was callback number verified?: Yes  Preferred pharmacy (If refill request): na  Calls to office within the last 48 hours?:No    Patient notified that their information will be routed to the [facility] clinical triage team for review. Patient is advised that they will receive a phone call from the triage department. If symptom related and symptoms worsen before receiving a call back, the patient has been advised to proceed to the nearest ED.      Rikki process started for medication coverage & navigator left and pt needs new navigator.        Inpatient Physical Exam